# Patient Record
Sex: MALE | Race: BLACK OR AFRICAN AMERICAN | Employment: OTHER | URBAN - METROPOLITAN AREA
[De-identification: names, ages, dates, MRNs, and addresses within clinical notes are randomized per-mention and may not be internally consistent; named-entity substitution may affect disease eponyms.]

---

## 2017-03-15 ENCOUNTER — HOSPITAL ENCOUNTER (OUTPATIENT)
Age: 62
Setting detail: SPECIMEN
Discharge: HOME | End: 2017-03-15
Payer: MEDICARE

## 2017-03-15 ENCOUNTER — HOSPITAL ENCOUNTER (OUTPATIENT)
Age: 62
Discharge: HOME | End: 2017-03-15
Payer: MEDICARE

## 2017-03-15 ENCOUNTER — OFFICE VISIT (OUTPATIENT)
Dept: INTERNAL MEDICINE | Age: 62
End: 2017-03-15
Payer: MEDICARE

## 2017-03-15 VITALS
HEART RATE: 85 BPM | HEIGHT: 72 IN | DIASTOLIC BLOOD PRESSURE: 79 MMHG | TEMPERATURE: 98 F | BODY MASS INDEX: 17.88 KG/M2 | SYSTOLIC BLOOD PRESSURE: 113 MMHG | OXYGEN SATURATION: 100 % | WEIGHT: 132 LBS

## 2017-03-15 DIAGNOSIS — N18.3 CKD (CHRONIC KIDNEY DISEASE), STAGE 3 (MODERATE): ICD-10-CM

## 2017-03-15 DIAGNOSIS — J44.9 MODERATE COPD (CHRONIC OBSTRUCTIVE PULMONARY DISEASE) (HCC): ICD-10-CM

## 2017-03-15 DIAGNOSIS — I25.5 ISCHEMIC CARDIOMYOPATHY: Primary | ICD-10-CM

## 2017-03-15 DIAGNOSIS — I25.10 CORONARY ARTERY DISEASE INVOLVING NATIVE CORONARY ARTERY OF NATIVE HEART WITHOUT ANGINA PECTORIS: ICD-10-CM

## 2017-03-15 DIAGNOSIS — H61.23 BILATERAL IMPACTED CERUMEN: ICD-10-CM

## 2017-03-15 DIAGNOSIS — R73.02 IMPAIRED GLUCOSE TOLERANCE: ICD-10-CM

## 2017-03-15 DIAGNOSIS — F17.200 SMOKER: ICD-10-CM

## 2017-03-15 DIAGNOSIS — I10 ESSENTIAL HYPERTENSION: ICD-10-CM

## 2017-03-15 DIAGNOSIS — R73.03 PREDIABETES: ICD-10-CM

## 2017-03-15 LAB
ANION GAP SERPL CALCULATED.3IONS-SCNC: 15 MMOL/L (ref 9–17)
BUN BLDV-MCNC: 11 MG/DL (ref 8–23)
BUN/CREAT BLD: NORMAL (ref 9–20)
CALCIUM SERPL-MCNC: 8.9 MG/DL (ref 8.6–10.4)
CHLORIDE BLD-SCNC: 99 MMOL/L (ref 98–107)
CO2: 24 MMOL/L (ref 20–31)
CREAT SERPL-MCNC: 1.01 MG/DL (ref 0.7–1.2)
ESTIMATED AVERAGE GLUCOSE: 123 MG/DL
GFR AFRICAN AMERICAN: >60 ML/MIN
GFR NON-AFRICAN AMERICAN: >60 ML/MIN
GFR SERPL CREATININE-BSD FRML MDRD: NORMAL ML/MIN/{1.73_M2}
GFR SERPL CREATININE-BSD FRML MDRD: NORMAL ML/MIN/{1.73_M2}
GLUCOSE BLD-MCNC: 75 MG/DL (ref 70–99)
HBA1C MFR BLD: 5.9 % (ref 4–6)
POTASSIUM SERPL-SCNC: 4.1 MMOL/L (ref 3.7–5.3)
SODIUM BLD-SCNC: 138 MMOL/L (ref 135–144)

## 2017-03-15 PROCEDURE — 83036 HEMOGLOBIN GLYCOSYLATED A1C: CPT

## 2017-03-15 PROCEDURE — 99212 OFFICE O/P EST SF 10 MIN: CPT | Performed by: INTERNAL MEDICINE

## 2017-03-15 PROCEDURE — G8599 NO ASA/ANTIPLAT THER USE RNG: HCPCS | Performed by: INTERNAL MEDICINE

## 2017-03-15 PROCEDURE — 36415 COLL VENOUS BLD VENIPUNCTURE: CPT

## 2017-03-15 PROCEDURE — G8484 FLU IMMUNIZE NO ADMIN: HCPCS | Performed by: INTERNAL MEDICINE

## 2017-03-15 PROCEDURE — G8926 SPIRO NO PERF OR DOC: HCPCS | Performed by: INTERNAL MEDICINE

## 2017-03-15 PROCEDURE — 99214 OFFICE O/P EST MOD 30 MIN: CPT | Performed by: INTERNAL MEDICINE

## 2017-03-15 PROCEDURE — 80048 BASIC METABOLIC PNL TOTAL CA: CPT

## 2017-03-15 PROCEDURE — 3023F SPIROM DOC REV: CPT | Performed by: INTERNAL MEDICINE

## 2017-03-15 PROCEDURE — 4004F PT TOBACCO SCREEN RCVD TLK: CPT | Performed by: INTERNAL MEDICINE

## 2017-03-15 PROCEDURE — G8419 CALC BMI OUT NRM PARAM NOF/U: HCPCS | Performed by: INTERNAL MEDICINE

## 2017-03-15 PROCEDURE — G8427 DOCREV CUR MEDS BY ELIG CLIN: HCPCS | Performed by: INTERNAL MEDICINE

## 2017-03-15 PROCEDURE — 3017F COLORECTAL CA SCREEN DOC REV: CPT | Performed by: INTERNAL MEDICINE

## 2017-03-15 ASSESSMENT — ENCOUNTER SYMPTOMS
EYE REDNESS: 0
SHORTNESS OF BREATH: 1
ABDOMINAL PAIN: 0
SORE THROAT: 0
BLURRED VISION: 0
SPUTUM PRODUCTION: 0
NAUSEA: 0
ORTHOPNEA: 0
COUGH: 1
WHEEZING: 0
CONSTIPATION: 0

## 2017-03-15 ASSESSMENT — PATIENT HEALTH QUESTIONNAIRE - PHQ9
2. FEELING DOWN, DEPRESSED OR HOPELESS: 0
1. LITTLE INTEREST OR PLEASURE IN DOING THINGS: 1
SUM OF ALL RESPONSES TO PHQ9 QUESTIONS 1 & 2: 1
SUM OF ALL RESPONSES TO PHQ QUESTIONS 1-9: 1

## 2017-03-22 ENCOUNTER — NURSE ONLY (OUTPATIENT)
Dept: INTERNAL MEDICINE | Age: 62
End: 2017-03-22
Payer: MEDICARE

## 2017-03-22 DIAGNOSIS — H61.23 BILATERAL IMPACTED CERUMEN: Primary | ICD-10-CM

## 2017-03-22 PROCEDURE — 99211 OFF/OP EST MAY X REQ PHY/QHP: CPT | Performed by: INTERNAL MEDICINE

## 2017-06-21 ENCOUNTER — OFFICE VISIT (OUTPATIENT)
Dept: INTERNAL MEDICINE | Age: 62
End: 2017-06-21
Payer: MEDICARE

## 2017-06-21 VITALS
BODY MASS INDEX: 17.47 KG/M2 | HEIGHT: 72 IN | OXYGEN SATURATION: 100 % | SYSTOLIC BLOOD PRESSURE: 87 MMHG | DIASTOLIC BLOOD PRESSURE: 62 MMHG | HEART RATE: 83 BPM | WEIGHT: 129 LBS

## 2017-06-21 DIAGNOSIS — Z87.891 PERSONAL HISTORY OF TOBACCO USE: ICD-10-CM

## 2017-06-21 DIAGNOSIS — N18.3 CKD (CHRONIC KIDNEY DISEASE), STAGE 3 (MODERATE): ICD-10-CM

## 2017-06-21 DIAGNOSIS — I25.10 CORONARY ARTERY DISEASE INVOLVING NATIVE CORONARY ARTERY OF NATIVE HEART WITHOUT ANGINA PECTORIS: ICD-10-CM

## 2017-06-21 DIAGNOSIS — E78.00 PURE HYPERCHOLESTEROLEMIA: ICD-10-CM

## 2017-06-21 DIAGNOSIS — I42.9 CARDIOMYOPATHY (HCC): ICD-10-CM

## 2017-06-21 DIAGNOSIS — R73.03 PREDIABETES: ICD-10-CM

## 2017-06-21 DIAGNOSIS — F10.21 ALCOHOL DEPENDENCE IN REMISSION (HCC): ICD-10-CM

## 2017-06-21 DIAGNOSIS — K63.5 COLON POLYP: ICD-10-CM

## 2017-06-21 DIAGNOSIS — J44.9 COPD, MODERATE (HCC): ICD-10-CM

## 2017-06-21 DIAGNOSIS — F17.200 SMOKER: ICD-10-CM

## 2017-06-21 DIAGNOSIS — D64.9 ANEMIA, UNSPECIFIED TYPE: ICD-10-CM

## 2017-06-21 DIAGNOSIS — I10 ESSENTIAL HYPERTENSION: Primary | ICD-10-CM

## 2017-06-21 PROCEDURE — G8926 SPIRO NO PERF OR DOC: HCPCS | Performed by: INTERNAL MEDICINE

## 2017-06-21 PROCEDURE — 99214 OFFICE O/P EST MOD 30 MIN: CPT | Performed by: INTERNAL MEDICINE

## 2017-06-21 PROCEDURE — G0296 VISIT TO DETERM LDCT ELIG: HCPCS | Performed by: INTERNAL MEDICINE

## 2017-06-21 PROCEDURE — G8419 CALC BMI OUT NRM PARAM NOF/U: HCPCS | Performed by: INTERNAL MEDICINE

## 2017-06-21 PROCEDURE — G8427 DOCREV CUR MEDS BY ELIG CLIN: HCPCS | Performed by: INTERNAL MEDICINE

## 2017-06-21 PROCEDURE — 99215 OFFICE O/P EST HI 40 MIN: CPT

## 2017-06-21 PROCEDURE — G8599 NO ASA/ANTIPLAT THER USE RNG: HCPCS | Performed by: INTERNAL MEDICINE

## 2017-06-21 PROCEDURE — 3023F SPIROM DOC REV: CPT | Performed by: INTERNAL MEDICINE

## 2017-06-21 PROCEDURE — 3017F COLORECTAL CA SCREEN DOC REV: CPT | Performed by: INTERNAL MEDICINE

## 2017-06-21 PROCEDURE — 4004F PT TOBACCO SCREEN RCVD TLK: CPT | Performed by: INTERNAL MEDICINE

## 2017-06-21 RX ORDER — LOVASTATIN 40 MG/1
TABLET ORAL
Qty: 90 TABLET | Refills: 2 | Status: SHIPPED | OUTPATIENT
Start: 2017-06-21 | End: 2018-01-30 | Stop reason: SDUPTHER

## 2017-06-21 RX ORDER — ALBUTEROL SULFATE 90 UG/1
2 AEROSOL, METERED RESPIRATORY (INHALATION) EVERY 6 HOURS PRN
Qty: 1 INHALER | Refills: 3 | Status: SHIPPED | OUTPATIENT
Start: 2017-06-21 | End: 2019-03-07 | Stop reason: SDUPTHER

## 2017-06-21 RX ORDER — LISINOPRIL 2.5 MG/1
TABLET ORAL
Qty: 90 TABLET | Refills: 2 | Status: SHIPPED | OUTPATIENT
Start: 2017-06-21 | End: 2018-01-30 | Stop reason: SDUPTHER

## 2017-06-21 RX ORDER — BUDESONIDE AND FORMOTEROL FUMARATE DIHYDRATE 80; 4.5 UG/1; UG/1
2 AEROSOL RESPIRATORY (INHALATION) 2 TIMES DAILY
Qty: 2 INHALER | Refills: 5 | Status: SHIPPED | OUTPATIENT
Start: 2017-06-21 | End: 2018-01-30 | Stop reason: SDUPTHER

## 2017-06-21 ASSESSMENT — ENCOUNTER SYMPTOMS
COUGH: 1
WHEEZING: 0
BLURRED VISION: 0
EYE PAIN: 0
NAUSEA: 0
SHORTNESS OF BREATH: 0
ABDOMINAL PAIN: 0
HEARTBURN: 0
BLOOD IN STOOL: 0
CONSTIPATION: 1
DIARRHEA: 0
SPUTUM PRODUCTION: 1
EYE REDNESS: 0

## 2017-06-22 ENCOUNTER — TELEPHONE (OUTPATIENT)
Dept: CASE MANAGEMENT | Age: 62
End: 2017-06-22

## 2017-06-27 ENCOUNTER — TELEPHONE (OUTPATIENT)
Dept: CASE MANAGEMENT | Age: 62
End: 2017-06-27

## 2017-06-29 ENCOUNTER — HOSPITAL ENCOUNTER (OUTPATIENT)
Dept: CT IMAGING | Age: 62
Discharge: HOME | End: 2017-06-29
Payer: MEDICARE

## 2017-06-29 DIAGNOSIS — Z87.891 PERSONAL HISTORY OF TOBACCO USE: ICD-10-CM

## 2017-06-29 PROCEDURE — G0297 LDCT FOR LUNG CA SCREEN: HCPCS

## 2017-07-21 DIAGNOSIS — J44.9 COPD, MODERATE (HCC): ICD-10-CM

## 2017-07-21 RX ORDER — TIOTROPIUM BROMIDE 18 UG/1
CAPSULE ORAL; RESPIRATORY (INHALATION)
Qty: 30 CAPSULE | Refills: 0 | Status: SHIPPED | OUTPATIENT
Start: 2017-07-21 | End: 2018-01-30 | Stop reason: SDUPTHER

## 2017-08-10 ENCOUNTER — TELEPHONE (OUTPATIENT)
Dept: INTERNAL MEDICINE | Age: 62
End: 2017-08-10

## 2017-08-10 ENCOUNTER — OFFICE VISIT (OUTPATIENT)
Dept: GASTROENTEROLOGY | Age: 62
End: 2017-08-10
Payer: MEDICARE

## 2017-08-10 VITALS
WEIGHT: 130 LBS | HEIGHT: 72 IN | BODY MASS INDEX: 17.61 KG/M2 | DIASTOLIC BLOOD PRESSURE: 59 MMHG | SYSTOLIC BLOOD PRESSURE: 94 MMHG | OXYGEN SATURATION: 99 % | HEART RATE: 92 BPM | TEMPERATURE: 98.1 F

## 2017-08-10 DIAGNOSIS — Z86.010 HISTORY OF COLON POLYPS: ICD-10-CM

## 2017-08-10 DIAGNOSIS — D64.9 ANEMIA, UNSPECIFIED TYPE: ICD-10-CM

## 2017-08-10 PROBLEM — Z86.0100 HISTORY OF COLON POLYPS: Status: ACTIVE | Noted: 2017-08-10

## 2017-08-10 PROCEDURE — G8599 NO ASA/ANTIPLAT THER USE RNG: HCPCS | Performed by: INTERNAL MEDICINE

## 2017-08-10 PROCEDURE — 4004F PT TOBACCO SCREEN RCVD TLK: CPT | Performed by: INTERNAL MEDICINE

## 2017-08-10 PROCEDURE — G8427 DOCREV CUR MEDS BY ELIG CLIN: HCPCS | Performed by: INTERNAL MEDICINE

## 2017-08-10 PROCEDURE — 99204 OFFICE O/P NEW MOD 45 MIN: CPT | Performed by: INTERNAL MEDICINE

## 2017-08-10 PROCEDURE — G8419 CALC BMI OUT NRM PARAM NOF/U: HCPCS | Performed by: INTERNAL MEDICINE

## 2017-08-10 PROCEDURE — 3017F COLORECTAL CA SCREEN DOC REV: CPT | Performed by: INTERNAL MEDICINE

## 2017-08-10 ASSESSMENT — ENCOUNTER SYMPTOMS
BLOOD IN STOOL: 0
TROUBLE SWALLOWING: 0
SORE THROAT: 0
CONSTIPATION: 1
VOMITING: 0
ABDOMINAL PAIN: 0
ANAL BLEEDING: 0
DIARRHEA: 0
CHOKING: 0
ABDOMINAL DISTENTION: 0
COUGH: 0
BACK PAIN: 1
SHORTNESS OF BREATH: 0
NAUSEA: 0
RECTAL PAIN: 0

## 2017-08-22 DIAGNOSIS — E78.00 PURE HYPERCHOLESTEROLEMIA: ICD-10-CM

## 2017-08-24 RX ORDER — LOVASTATIN 40 MG/1
TABLET ORAL
Qty: 90 TABLET | Refills: 0 | Status: SHIPPED | OUTPATIENT
Start: 2017-08-24 | End: 2017-09-13 | Stop reason: SDUPTHER

## 2017-09-13 ENCOUNTER — HOSPITAL ENCOUNTER (OUTPATIENT)
Dept: PREADMISSION TESTING | Age: 62
Discharge: HOME | End: 2017-09-13
Payer: MEDICARE

## 2017-09-13 VITALS
HEART RATE: 87 BPM | SYSTOLIC BLOOD PRESSURE: 97 MMHG | RESPIRATION RATE: 16 BRPM | DIASTOLIC BLOOD PRESSURE: 71 MMHG | HEIGHT: 72 IN | WEIGHT: 129.5 LBS | BODY MASS INDEX: 17.54 KG/M2 | OXYGEN SATURATION: 99 % | TEMPERATURE: 97.2 F

## 2017-09-13 LAB
ABSOLUTE EOS #: 0.08 K/UL (ref 0–0.4)
ABSOLUTE LYMPH #: 1.6 K/UL (ref 1–4.8)
ABSOLUTE MONO #: 0.53 K/UL (ref 0.1–1.3)
ANION GAP SERPL CALCULATED.3IONS-SCNC: 13 MMOL/L (ref 9–17)
BASOPHILS # BLD: 1 %
BASOPHILS ABSOLUTE: 0.08 K/UL (ref 0–0.2)
BUN BLDV-MCNC: 11 MG/DL (ref 8–23)
BUN/CREAT BLD: NORMAL (ref 9–20)
CALCIUM SERPL-MCNC: 9.1 MG/DL (ref 8.6–10.4)
CHLORIDE BLD-SCNC: 102 MMOL/L (ref 98–107)
CO2: 25 MMOL/L (ref 20–31)
CREAT SERPL-MCNC: 1.07 MG/DL (ref 0.7–1.2)
DIFFERENTIAL TYPE: ABNORMAL
EOSINOPHILS RELATIVE PERCENT: 1 %
GFR AFRICAN AMERICAN: >60 ML/MIN
GFR NON-AFRICAN AMERICAN: >60 ML/MIN
GFR SERPL CREATININE-BSD FRML MDRD: NORMAL ML/MIN/{1.73_M2}
GFR SERPL CREATININE-BSD FRML MDRD: NORMAL ML/MIN/{1.73_M2}
GLUCOSE BLD-MCNC: 94 MG/DL (ref 70–99)
HCT VFR BLD CALC: 25 % (ref 41–53)
HEMOGLOBIN: 7.2 G/DL (ref 13.5–17.5)
LYMPHOCYTES # BLD: 21 %
MCH RBC QN AUTO: 19.5 PG (ref 26–34)
MCHC RBC AUTO-ENTMCNC: 28.8 G/DL (ref 31–37)
MCV RBC AUTO: 67.6 FL (ref 80–100)
MONOCYTES # BLD: 7 %
MORPHOLOGY: ABNORMAL
PDW BLD-RTO: 21.3 % (ref 11.5–14.9)
PLATELET # BLD: 458 K/UL (ref 150–450)
PLATELET ESTIMATE: ABNORMAL
PMV BLD AUTO: 7 FL (ref 6–12)
POTASSIUM SERPL-SCNC: 4.2 MMOL/L (ref 3.7–5.3)
RBC # BLD: 3.69 M/UL (ref 4.5–5.9)
RBC # BLD: ABNORMAL 10*6/UL
SEG NEUTROPHILS: 70 %
SEGMENTED NEUTROPHILS ABSOLUTE COUNT: 5.31 K/UL (ref 1.3–9.1)
SODIUM BLD-SCNC: 140 MMOL/L (ref 135–144)
WBC # BLD: 7.6 K/UL (ref 3.5–11)
WBC # BLD: ABNORMAL 10*3/UL

## 2017-09-13 PROCEDURE — 85025 COMPLETE CBC W/AUTO DIFF WBC: CPT

## 2017-09-13 PROCEDURE — 80048 BASIC METABOLIC PNL TOTAL CA: CPT

## 2017-09-13 PROCEDURE — 36415 COLL VENOUS BLD VENIPUNCTURE: CPT

## 2017-09-13 PROCEDURE — 93005 ELECTROCARDIOGRAM TRACING: CPT

## 2017-09-13 RX ORDER — ASPIRIN 325 MG
325 TABLET ORAL DAILY
Status: ON HOLD | COMMUNITY
End: 2018-01-24 | Stop reason: HOSPADM

## 2017-09-14 ENCOUNTER — ANESTHESIA EVENT (OUTPATIENT)
Dept: OPERATING ROOM | Age: 62
End: 2017-09-14
Payer: MEDICARE

## 2017-09-14 LAB
EKG ATRIAL RATE: 83 BPM
EKG P AXIS: 83 DEGREES
EKG P-R INTERVAL: 140 MS
EKG Q-T INTERVAL: 364 MS
EKG QRS DURATION: 88 MS
EKG QTC CALCULATION (BAZETT): 427 MS
EKG R AXIS: 71 DEGREES
EKG T AXIS: 71 DEGREES
EKG VENTRICULAR RATE: 83 BPM

## 2017-09-14 RX ORDER — SODIUM CHLORIDE 0.9 % (FLUSH) 0.9 %
10 SYRINGE (ML) INJECTION PRN
Status: CANCELLED | OUTPATIENT
Start: 2017-09-14

## 2017-09-14 RX ORDER — SODIUM CHLORIDE 0.9 % (FLUSH) 0.9 %
10 SYRINGE (ML) INJECTION EVERY 12 HOURS SCHEDULED
Status: CANCELLED | OUTPATIENT
Start: 2017-09-14

## 2017-09-14 RX ORDER — LIDOCAINE HYDROCHLORIDE 10 MG/ML
1 INJECTION, SOLUTION EPIDURAL; INFILTRATION; INTRACAUDAL; PERINEURAL
Status: CANCELLED | OUTPATIENT
Start: 2017-09-14 | End: 2017-09-14

## 2017-09-14 RX ORDER — SODIUM CHLORIDE, SODIUM LACTATE, POTASSIUM CHLORIDE, CALCIUM CHLORIDE 600; 310; 30; 20 MG/100ML; MG/100ML; MG/100ML; MG/100ML
INJECTION, SOLUTION INTRAVENOUS CONTINUOUS
Status: CANCELLED | OUTPATIENT
Start: 2017-09-14

## 2017-09-19 ENCOUNTER — ANESTHESIA (OUTPATIENT)
Dept: OPERATING ROOM | Age: 62
End: 2017-09-19
Payer: MEDICARE

## 2017-09-19 ENCOUNTER — HOSPITAL ENCOUNTER (OUTPATIENT)
Age: 62
Setting detail: OUTPATIENT SURGERY
Discharge: HOME | End: 2017-09-19
Attending: INTERNAL MEDICINE | Admitting: INTERNAL MEDICINE
Payer: MEDICARE

## 2017-09-19 VITALS
RESPIRATION RATE: 26 BRPM | OXYGEN SATURATION: 100 % | SYSTOLIC BLOOD PRESSURE: 108 MMHG | DIASTOLIC BLOOD PRESSURE: 69 MMHG

## 2017-09-19 VITALS
OXYGEN SATURATION: 100 % | HEART RATE: 71 BPM | SYSTOLIC BLOOD PRESSURE: 127 MMHG | HEIGHT: 72 IN | RESPIRATION RATE: 16 BRPM | TEMPERATURE: 98.2 F | DIASTOLIC BLOOD PRESSURE: 85 MMHG

## 2017-09-19 PROCEDURE — 7100000011 HC PHASE II RECOVERY - ADDTL 15 MIN: Performed by: INTERNAL MEDICINE

## 2017-09-19 PROCEDURE — 6360000002 HC RX W HCPCS: Performed by: NURSE ANESTHETIST, CERTIFIED REGISTERED

## 2017-09-19 PROCEDURE — 2500000003 HC RX 250 WO HCPCS: Performed by: NURSE ANESTHETIST, CERTIFIED REGISTERED

## 2017-09-19 PROCEDURE — 3700000001 HC ADD 15 MINUTES (ANESTHESIA): Performed by: INTERNAL MEDICINE

## 2017-09-19 PROCEDURE — 43255 EGD CONTROL BLEEDING ANY: CPT | Performed by: INTERNAL MEDICINE

## 2017-09-19 PROCEDURE — 7100000010 HC PHASE II RECOVERY - FIRST 15 MIN: Performed by: INTERNAL MEDICINE

## 2017-09-19 PROCEDURE — 3609027000 HC COLONOSCOPY: Performed by: INTERNAL MEDICINE

## 2017-09-19 PROCEDURE — 2580000003 HC RX 258: Performed by: ANESTHESIOLOGY

## 2017-09-19 PROCEDURE — 7100000000 HC PACU RECOVERY - FIRST 15 MIN: Performed by: INTERNAL MEDICINE

## 2017-09-19 PROCEDURE — 45378 DIAGNOSTIC COLONOSCOPY: CPT | Performed by: INTERNAL MEDICINE

## 2017-09-19 PROCEDURE — 2720000010 HC SURG SUPPLY STERILE: Performed by: INTERNAL MEDICINE

## 2017-09-19 PROCEDURE — 2780000010 HC IMPLANT OTHER: Performed by: INTERNAL MEDICINE

## 2017-09-19 PROCEDURE — 3609017100 HC EGD: Performed by: INTERNAL MEDICINE

## 2017-09-19 PROCEDURE — 7100000031 HC ASPR PHASE II RECOVERY - ADDTL 15 MIN: Performed by: INTERNAL MEDICINE

## 2017-09-19 PROCEDURE — 7100000001 HC PACU RECOVERY - ADDTL 15 MIN: Performed by: INTERNAL MEDICINE

## 2017-09-19 PROCEDURE — 7100000030 HC ASPR PHASE II RECOVERY - FIRST 15 MIN: Performed by: INTERNAL MEDICINE

## 2017-09-19 PROCEDURE — 3700000000 HC ANESTHESIA ATTENDED CARE: Performed by: INTERNAL MEDICINE

## 2017-09-19 DEVICE — CLIPPING DEVICE
Type: IMPLANTABLE DEVICE | Site: ESOPHAGUS | Status: FUNCTIONAL
Brand: RESOLUTION CLIP

## 2017-09-19 RX ORDER — PROPOFOL 10 MG/ML
INJECTION, EMULSION INTRAVENOUS PRN
Status: DISCONTINUED | OUTPATIENT
Start: 2017-09-19 | End: 2017-09-19 | Stop reason: SDUPTHER

## 2017-09-19 RX ORDER — SODIUM CHLORIDE, SODIUM LACTATE, POTASSIUM CHLORIDE, CALCIUM CHLORIDE 600; 310; 30; 20 MG/100ML; MG/100ML; MG/100ML; MG/100ML
INJECTION, SOLUTION INTRAVENOUS CONTINUOUS
Status: DISCONTINUED | OUTPATIENT
Start: 2017-09-19 | End: 2017-09-19 | Stop reason: HOSPADM

## 2017-09-19 RX ORDER — SODIUM CHLORIDE 0.9 % (FLUSH) 0.9 %
10 SYRINGE (ML) INJECTION PRN
Status: DISCONTINUED | OUTPATIENT
Start: 2017-09-19 | End: 2017-09-19 | Stop reason: HOSPADM

## 2017-09-19 RX ORDER — LIDOCAINE HYDROCHLORIDE 10 MG/ML
1 INJECTION, SOLUTION EPIDURAL; INFILTRATION; INTRACAUDAL; PERINEURAL
Status: DISCONTINUED | OUTPATIENT
Start: 2017-09-19 | End: 2017-09-19 | Stop reason: HOSPADM

## 2017-09-19 RX ORDER — SODIUM CHLORIDE 0.9 % (FLUSH) 0.9 %
10 SYRINGE (ML) INJECTION EVERY 12 HOURS SCHEDULED
Status: DISCONTINUED | OUTPATIENT
Start: 2017-09-19 | End: 2017-09-19 | Stop reason: HOSPADM

## 2017-09-19 RX ORDER — LIDOCAINE HYDROCHLORIDE 10 MG/ML
INJECTION, SOLUTION EPIDURAL; INFILTRATION; INTRACAUDAL; PERINEURAL PRN
Status: DISCONTINUED | OUTPATIENT
Start: 2017-09-19 | End: 2017-09-19 | Stop reason: SDUPTHER

## 2017-09-19 RX ADMIN — PROPOFOL 20 MG: 10 INJECTION, EMULSION INTRAVENOUS at 11:51

## 2017-09-19 RX ADMIN — PROPOFOL 10 MG: 10 INJECTION, EMULSION INTRAVENOUS at 12:09

## 2017-09-19 RX ADMIN — PROPOFOL 10 MG: 10 INJECTION, EMULSION INTRAVENOUS at 12:14

## 2017-09-19 RX ADMIN — PROPOFOL 10 MG: 10 INJECTION, EMULSION INTRAVENOUS at 12:12

## 2017-09-19 RX ADMIN — PROPOFOL 10 MG: 10 INJECTION, EMULSION INTRAVENOUS at 11:53

## 2017-09-19 RX ADMIN — PROPOFOL 10 MG: 10 INJECTION, EMULSION INTRAVENOUS at 12:16

## 2017-09-19 RX ADMIN — PROPOFOL 20 MG: 10 INJECTION, EMULSION INTRAVENOUS at 11:48

## 2017-09-19 RX ADMIN — PROPOFOL 10 MG: 10 INJECTION, EMULSION INTRAVENOUS at 11:54

## 2017-09-19 RX ADMIN — PROPOFOL 10 MG: 10 INJECTION, EMULSION INTRAVENOUS at 12:02

## 2017-09-19 RX ADMIN — PROPOFOL 10 MG: 10 INJECTION, EMULSION INTRAVENOUS at 11:55

## 2017-09-19 RX ADMIN — PROPOFOL 50 MG: 10 INJECTION, EMULSION INTRAVENOUS at 11:46

## 2017-09-19 RX ADMIN — PROPOFOL 10 MG: 10 INJECTION, EMULSION INTRAVENOUS at 11:56

## 2017-09-19 RX ADMIN — SODIUM CHLORIDE, POTASSIUM CHLORIDE, SODIUM LACTATE AND CALCIUM CHLORIDE: 600; 310; 30; 20 INJECTION, SOLUTION INTRAVENOUS at 11:15

## 2017-09-19 RX ADMIN — LIDOCAINE HYDROCHLORIDE 50 MG: 10 INJECTION, SOLUTION EPIDURAL; INFILTRATION; INTRACAUDAL; PERINEURAL at 11:46

## 2017-09-19 RX ADMIN — PROPOFOL 10 MG: 10 INJECTION, EMULSION INTRAVENOUS at 11:49

## 2017-09-19 RX ADMIN — PROPOFOL 20 MG: 10 INJECTION, EMULSION INTRAVENOUS at 12:04

## 2017-09-19 RX ADMIN — PROPOFOL 10 MG: 10 INJECTION, EMULSION INTRAVENOUS at 11:57

## 2017-09-19 RX ADMIN — PROPOFOL 10 MG: 10 INJECTION, EMULSION INTRAVENOUS at 12:01

## 2017-09-19 RX ADMIN — PROPOFOL 10 MG: 10 INJECTION, EMULSION INTRAVENOUS at 12:07

## 2017-09-19 RX ADMIN — PROPOFOL 10 MG: 10 INJECTION, EMULSION INTRAVENOUS at 12:00

## 2017-09-19 RX ADMIN — PROPOFOL 10 MG: 10 INJECTION, EMULSION INTRAVENOUS at 12:05

## 2017-09-19 ASSESSMENT — PAIN - FUNCTIONAL ASSESSMENT: PAIN_FUNCTIONAL_ASSESSMENT: 0-10

## 2017-09-19 ASSESSMENT — PAIN SCALES - GENERAL
PAINLEVEL_OUTOF10: 0

## 2017-09-19 ASSESSMENT — COPD QUESTIONNAIRES: CAT_SEVERITY: NO INTERVAL CHANGE

## 2018-01-22 ENCOUNTER — APPOINTMENT (OUTPATIENT)
Dept: GENERAL RADIOLOGY | Age: 63
DRG: 812 | End: 2018-01-22
Payer: MEDICARE

## 2018-01-22 ENCOUNTER — HOSPITAL ENCOUNTER (INPATIENT)
Age: 63
LOS: 1 days | Discharge: HOME HEALTH | DRG: 812 | End: 2018-01-24
Attending: EMERGENCY MEDICINE | Admitting: INTERNAL MEDICINE
Payer: MEDICARE

## 2018-01-22 DIAGNOSIS — D64.9 ANEMIA, UNSPECIFIED TYPE: ICD-10-CM

## 2018-01-22 DIAGNOSIS — R07.9 CHEST PAIN, UNSPECIFIED TYPE: Primary | ICD-10-CM

## 2018-01-22 LAB
ANION GAP SERPL CALCULATED.3IONS-SCNC: 13 MMOL/L (ref 9–17)
BUN BLDV-MCNC: 10 MG/DL (ref 8–23)
BUN/CREAT BLD: ABNORMAL (ref 9–20)
CALCIUM SERPL-MCNC: 8.3 MG/DL (ref 8.6–10.4)
CHLORIDE BLD-SCNC: 105 MMOL/L (ref 98–107)
CO2: 24 MMOL/L (ref 20–31)
CREAT SERPL-MCNC: 1.2 MG/DL (ref 0.7–1.2)
EKG ATRIAL RATE: 98 BPM
EKG P AXIS: 85 DEGREES
EKG P-R INTERVAL: 132 MS
EKG Q-T INTERVAL: 338 MS
EKG QRS DURATION: 84 MS
EKG QTC CALCULATION (BAZETT): 431 MS
EKG R AXIS: 73 DEGREES
EKG T AXIS: 77 DEGREES
EKG VENTRICULAR RATE: 98 BPM
FERRITIN: 13 UG/L (ref 30–400)
FOLATE: 14.4 NG/ML
GFR AFRICAN AMERICAN: >60 ML/MIN
GFR NON-AFRICAN AMERICAN: >60 ML/MIN
GFR SERPL CREATININE-BSD FRML MDRD: ABNORMAL ML/MIN/{1.73_M2}
GFR SERPL CREATININE-BSD FRML MDRD: ABNORMAL ML/MIN/{1.73_M2}
GLUCOSE BLD-MCNC: 98 MG/DL (ref 70–99)
HCT VFR BLD CALC: 26.4 % (ref 40.7–50.3)
HCT VFR BLD CALC: 26.9 % (ref 40.7–50.3)
HEMOGLOBIN: 6.7 G/DL (ref 13–17)
HEMOGLOBIN: 7.2 G/DL (ref 13–17)
IRON SATURATION: 4 % (ref 20–55)
IRON: 16 UG/DL (ref 59–158)
MCH RBC QN AUTO: 17.9 PG (ref 25.2–33.5)
MCHC RBC AUTO-ENTMCNC: 25.4 G/DL (ref 28.4–34.8)
MCV RBC AUTO: 70.4 FL (ref 82.6–102.9)
NRBC AUTOMATED: 0 PER 100 WBC
PDW BLD-RTO: 23.1 % (ref 11.8–14.4)
PLATELET # BLD: 397 K/UL (ref 138–453)
PMV BLD AUTO: 9.5 FL (ref 8.1–13.5)
POC TROPONIN I: 0 NG/ML (ref 0–0.1)
POC TROPONIN I: 0.01 NG/ML (ref 0–0.1)
POC TROPONIN INTERP: NORMAL
POC TROPONIN INTERP: NORMAL
POTASSIUM SERPL-SCNC: 4.3 MMOL/L (ref 3.7–5.3)
RBC # BLD: 3.75 M/UL (ref 4.21–5.77)
SODIUM BLD-SCNC: 142 MMOL/L (ref 135–144)
TOTAL IRON BINDING CAPACITY: 445 UG/DL (ref 250–450)
TROPONIN INTERP: NORMAL
TROPONIN INTERP: NORMAL
TROPONIN T: <0.03 NG/ML
TROPONIN T: <0.03 NG/ML
UNSATURATED IRON BINDING CAPACITY: 429 UG/DL (ref 112–347)
VITAMIN B-12: 571 PG/ML (ref 232–1245)
WBC # BLD: 5.3 K/UL (ref 3.5–11.3)

## 2018-01-22 PROCEDURE — P9016 RBC LEUKOCYTES REDUCED: HCPCS

## 2018-01-22 PROCEDURE — 82607 VITAMIN B-12: CPT

## 2018-01-22 PROCEDURE — 82746 ASSAY OF FOLIC ACID SERUM: CPT

## 2018-01-22 PROCEDURE — G0328 FECAL BLOOD SCRN IMMUNOASSAY: HCPCS

## 2018-01-22 PROCEDURE — G0378 HOSPITAL OBSERVATION PER HR: HCPCS

## 2018-01-22 PROCEDURE — 2580000003 HC RX 258: Performed by: EMERGENCY MEDICINE

## 2018-01-22 PROCEDURE — 6370000000 HC RX 637 (ALT 250 FOR IP): Performed by: NURSE PRACTITIONER

## 2018-01-22 PROCEDURE — 84484 ASSAY OF TROPONIN QUANT: CPT

## 2018-01-22 PROCEDURE — 6370000000 HC RX 637 (ALT 250 FOR IP): Performed by: INTERNAL MEDICINE

## 2018-01-22 PROCEDURE — 94640 AIRWAY INHALATION TREATMENT: CPT

## 2018-01-22 PROCEDURE — 86901 BLOOD TYPING SEROLOGIC RH(D): CPT

## 2018-01-22 PROCEDURE — 2580000003 HC RX 258: Performed by: INTERNAL MEDICINE

## 2018-01-22 PROCEDURE — 99285 EMERGENCY DEPT VISIT HI MDM: CPT

## 2018-01-22 PROCEDURE — 71046 X-RAY EXAM CHEST 2 VIEWS: CPT

## 2018-01-22 PROCEDURE — 96372 THER/PROPH/DIAG INJ SC/IM: CPT

## 2018-01-22 PROCEDURE — 6370000000 HC RX 637 (ALT 250 FOR IP): Performed by: EMERGENCY MEDICINE

## 2018-01-22 PROCEDURE — 94762 N-INVAS EAR/PLS OXIMTRY CONT: CPT

## 2018-01-22 PROCEDURE — 86920 COMPATIBILITY TEST SPIN: CPT

## 2018-01-22 PROCEDURE — 83540 ASSAY OF IRON: CPT

## 2018-01-22 PROCEDURE — 86900 BLOOD TYPING SEROLOGIC ABO: CPT

## 2018-01-22 PROCEDURE — 85027 COMPLETE CBC AUTOMATED: CPT

## 2018-01-22 PROCEDURE — 85018 HEMOGLOBIN: CPT

## 2018-01-22 PROCEDURE — 36415 COLL VENOUS BLD VENIPUNCTURE: CPT

## 2018-01-22 PROCEDURE — 82728 ASSAY OF FERRITIN: CPT

## 2018-01-22 PROCEDURE — 99223 1ST HOSP IP/OBS HIGH 75: CPT | Performed by: INTERNAL MEDICINE

## 2018-01-22 PROCEDURE — 93005 ELECTROCARDIOGRAM TRACING: CPT

## 2018-01-22 PROCEDURE — 80048 BASIC METABOLIC PNL TOTAL CA: CPT

## 2018-01-22 PROCEDURE — 86850 RBC ANTIBODY SCREEN: CPT

## 2018-01-22 PROCEDURE — 6360000002 HC RX W HCPCS: Performed by: INTERNAL MEDICINE

## 2018-01-22 PROCEDURE — 83550 IRON BINDING TEST: CPT

## 2018-01-22 PROCEDURE — 36430 TRANSFUSION BLD/BLD COMPNT: CPT

## 2018-01-22 PROCEDURE — 85014 HEMATOCRIT: CPT

## 2018-01-22 RX ORDER — MORPHINE SULFATE 2 MG/ML
2 INJECTION, SOLUTION INTRAMUSCULAR; INTRAVENOUS
Status: DISCONTINUED | OUTPATIENT
Start: 2018-01-22 | End: 2018-01-24 | Stop reason: HOSPADM

## 2018-01-22 RX ORDER — NITROGLYCERIN 0.4 MG/1
0.4 TABLET SUBLINGUAL EVERY 5 MIN PRN
Status: DISCONTINUED | OUTPATIENT
Start: 2018-01-22 | End: 2018-01-22 | Stop reason: SDUPTHER

## 2018-01-22 RX ORDER — ISOSORBIDE MONONITRATE 30 MG/1
30 TABLET, EXTENDED RELEASE ORAL DAILY
Status: DISCONTINUED | OUTPATIENT
Start: 2018-01-22 | End: 2018-01-24 | Stop reason: HOSPADM

## 2018-01-22 RX ORDER — SODIUM CHLORIDE 0.9 % (FLUSH) 0.9 %
10 SYRINGE (ML) INJECTION PRN
Status: DISCONTINUED | OUTPATIENT
Start: 2018-01-22 | End: 2018-01-24 | Stop reason: HOSPADM

## 2018-01-22 RX ORDER — POTASSIUM CHLORIDE 7.45 MG/ML
10 INJECTION INTRAVENOUS PRN
Status: DISCONTINUED | OUTPATIENT
Start: 2018-01-22 | End: 2018-01-24 | Stop reason: HOSPADM

## 2018-01-22 RX ORDER — ACETAMINOPHEN 325 MG/1
650 TABLET ORAL EVERY 4 HOURS PRN
Status: DISCONTINUED | OUTPATIENT
Start: 2018-01-22 | End: 2018-01-24 | Stop reason: HOSPADM

## 2018-01-22 RX ORDER — BISACODYL 10 MG
10 SUPPOSITORY, RECTAL RECTAL DAILY PRN
Status: DISCONTINUED | OUTPATIENT
Start: 2018-01-22 | End: 2018-01-24 | Stop reason: HOSPADM

## 2018-01-22 RX ORDER — POTASSIUM CHLORIDE 20MEQ/15ML
40 LIQUID (ML) ORAL PRN
Status: DISCONTINUED | OUTPATIENT
Start: 2018-01-22 | End: 2018-01-24 | Stop reason: HOSPADM

## 2018-01-22 RX ORDER — ALBUTEROL SULFATE 90 UG/1
2 AEROSOL, METERED RESPIRATORY (INHALATION) EVERY 6 HOURS PRN
Status: DISCONTINUED | OUTPATIENT
Start: 2018-01-22 | End: 2018-01-24 | Stop reason: HOSPADM

## 2018-01-22 RX ORDER — LISINOPRIL 2.5 MG/1
2.5 TABLET ORAL DAILY
Status: DISCONTINUED | OUTPATIENT
Start: 2018-01-22 | End: 2018-01-24 | Stop reason: HOSPADM

## 2018-01-22 RX ORDER — NICOTINE 21 MG/24HR
1 PATCH, TRANSDERMAL 24 HOURS TRANSDERMAL DAILY
Status: DISCONTINUED | OUTPATIENT
Start: 2018-01-22 | End: 2018-01-24 | Stop reason: HOSPADM

## 2018-01-22 RX ORDER — CLOPIDOGREL BISULFATE 75 MG/1
75 TABLET ORAL DAILY
Status: DISCONTINUED | OUTPATIENT
Start: 2018-01-22 | End: 2018-01-23

## 2018-01-22 RX ORDER — MAGNESIUM SULFATE 1 G/100ML
1 INJECTION INTRAVENOUS PRN
Status: DISCONTINUED | OUTPATIENT
Start: 2018-01-22 | End: 2018-01-24 | Stop reason: HOSPADM

## 2018-01-22 RX ORDER — SIMVASTATIN 20 MG
20 TABLET ORAL NIGHTLY
Status: DISCONTINUED | OUTPATIENT
Start: 2018-01-22 | End: 2018-01-24 | Stop reason: HOSPADM

## 2018-01-22 RX ORDER — ASPIRIN 325 MG
325 TABLET ORAL DAILY
Status: DISCONTINUED | OUTPATIENT
Start: 2018-01-22 | End: 2018-01-22 | Stop reason: SDUPTHER

## 2018-01-22 RX ORDER — ACETAMINOPHEN 325 MG/1
650 TABLET ORAL ONCE
Status: COMPLETED | OUTPATIENT
Start: 2018-01-22 | End: 2018-01-22

## 2018-01-22 RX ORDER — MORPHINE SULFATE 2 MG/ML
4 INJECTION, SOLUTION INTRAMUSCULAR; INTRAVENOUS
Status: DISCONTINUED | OUTPATIENT
Start: 2018-01-22 | End: 2018-01-24 | Stop reason: HOSPADM

## 2018-01-22 RX ORDER — SODIUM CHLORIDE 0.9 % (FLUSH) 0.9 %
10 SYRINGE (ML) INJECTION EVERY 12 HOURS SCHEDULED
Status: DISCONTINUED | OUTPATIENT
Start: 2018-01-22 | End: 2018-01-24 | Stop reason: HOSPADM

## 2018-01-22 RX ORDER — NITROGLYCERIN 0.4 MG/1
0.4 TABLET SUBLINGUAL EVERY 5 MIN PRN
Status: DISCONTINUED | OUTPATIENT
Start: 2018-01-22 | End: 2018-01-24 | Stop reason: HOSPADM

## 2018-01-22 RX ORDER — ONDANSETRON 2 MG/ML
4 INJECTION INTRAMUSCULAR; INTRAVENOUS EVERY 6 HOURS PRN
Status: DISCONTINUED | OUTPATIENT
Start: 2018-01-22 | End: 2018-01-24 | Stop reason: HOSPADM

## 2018-01-22 RX ORDER — 0.9 % SODIUM CHLORIDE 0.9 %
250 INTRAVENOUS SOLUTION INTRAVENOUS ONCE
Status: COMPLETED | OUTPATIENT
Start: 2018-01-22 | End: 2018-01-23

## 2018-01-22 RX ORDER — FAMOTIDINE 20 MG/1
20 TABLET, FILM COATED ORAL 2 TIMES DAILY
Status: DISCONTINUED | OUTPATIENT
Start: 2018-01-22 | End: 2018-01-24 | Stop reason: HOSPADM

## 2018-01-22 RX ORDER — HYDROCODONE BITARTRATE AND ACETAMINOPHEN 5; 325 MG/1; MG/1
1 TABLET ORAL EVERY 4 HOURS PRN
Status: DISCONTINUED | OUTPATIENT
Start: 2018-01-22 | End: 2018-01-24 | Stop reason: HOSPADM

## 2018-01-22 RX ORDER — POTASSIUM CHLORIDE 20 MEQ/1
40 TABLET, EXTENDED RELEASE ORAL PRN
Status: DISCONTINUED | OUTPATIENT
Start: 2018-01-22 | End: 2018-01-24 | Stop reason: HOSPADM

## 2018-01-22 RX ORDER — DOCUSATE SODIUM 100 MG/1
100 CAPSULE, LIQUID FILLED ORAL 2 TIMES DAILY
Status: DISCONTINUED | OUTPATIENT
Start: 2018-01-22 | End: 2018-01-24 | Stop reason: HOSPADM

## 2018-01-22 RX ADMIN — SIMVASTATIN 20 MG: 20 TABLET, FILM COATED ORAL at 20:48

## 2018-01-22 RX ADMIN — MOMETASONE FUROATE AND FORMOTEROL FUMARATE DIHYDRATE 2 PUFF: 200; 5 AEROSOL RESPIRATORY (INHALATION) at 21:27

## 2018-01-22 RX ADMIN — POLYETHYLENE GLYCOL 3350, SODIUM SULFATE ANHYDROUS, SODIUM BICARBONATE, SODIUM CHLORIDE, POTASSIUM CHLORIDE 4000 ML: 236; 22.74; 6.74; 5.86; 2.97 POWDER, FOR SOLUTION ORAL at 20:46

## 2018-01-22 RX ADMIN — METOPROLOL TARTRATE 25 MG: 25 TABLET ORAL at 20:46

## 2018-01-22 RX ADMIN — ENOXAPARIN SODIUM 40 MG: 40 INJECTION SUBCUTANEOUS at 20:46

## 2018-01-22 RX ADMIN — DOCUSATE SODIUM 100 MG: 100 CAPSULE ORAL at 22:34

## 2018-01-22 RX ADMIN — ACETAMINOPHEN 650 MG: 325 TABLET ORAL at 13:55

## 2018-01-22 RX ADMIN — FAMOTIDINE 20 MG: 20 TABLET, FILM COATED ORAL at 20:46

## 2018-01-22 RX ADMIN — BISACODYL 20 MG: 5 TABLET, DELAYED RELEASE ORAL at 22:34

## 2018-01-22 RX ADMIN — Medication 10 ML: at 20:47

## 2018-01-22 RX ADMIN — SODIUM CHLORIDE 250 ML: 9 INJECTION, SOLUTION INTRAVENOUS at 13:55

## 2018-01-22 ASSESSMENT — PAIN SCALES - GENERAL
PAINLEVEL_OUTOF10: 0
PAINLEVEL_OUTOF10: 2
PAINLEVEL_OUTOF10: 10
PAINLEVEL_OUTOF10: 0
PAINLEVEL_OUTOF10: 0

## 2018-01-22 ASSESSMENT — PAIN DESCRIPTION - DESCRIPTORS
DESCRIPTORS: ACHING
DESCRIPTORS: BURNING;ACHING;THROBBING

## 2018-01-22 ASSESSMENT — PAIN DESCRIPTION - LOCATION
LOCATION: CHEST
LOCATION: CHEST

## 2018-01-22 ASSESSMENT — ENCOUNTER SYMPTOMS
ABDOMINAL PAIN: 0
BLOOD IN STOOL: 0
COUGH: 0
DIARRHEA: 0
VOMITING: 0
CONSTIPATION: 0
BACK PAIN: 0
SHORTNESS OF BREATH: 0
NAUSEA: 0

## 2018-01-22 ASSESSMENT — PAIN DESCRIPTION - FREQUENCY
FREQUENCY: INTERMITTENT
FREQUENCY: INTERMITTENT

## 2018-01-22 ASSESSMENT — PAIN DESCRIPTION - PAIN TYPE
TYPE: ACUTE PAIN
TYPE: ACUTE PAIN

## 2018-01-22 ASSESSMENT — PAIN DESCRIPTION - ORIENTATION
ORIENTATION: LEFT
ORIENTATION: LEFT

## 2018-01-22 NOTE — CONSULTS
IF PAIN PERSISTS CALL 911 11/8/16  Yes Enid Rodriguez MD   clopidogrel (PLAVIX) 75 MG tablet Take 1 tablet by mouth daily 10/13/15  Yes Shayla Ballesteros CNP     Scheduled Meds:   sodium chloride  250 mL Intravenous Once    clopidogrel  75 mg Oral Daily    isosorbide mononitrate  30 mg Oral Daily    mometasone-formoterol  2 puff Inhalation BID    simvastatin  20 mg Oral Nightly    metoprolol tartrate  25 mg Oral BID    lisinopril  2.5 mg Oral Daily    tiotropium  18 mcg Inhalation Daily    sodium chloride flush  10 mL Intravenous 2 times per day    docusate sodium  100 mg Oral BID    enoxaparin  40 mg Subcutaneous Daily    famotidine  20 mg Oral BID    nicotine  1 patch Transdermal Daily    [START ON 1/23/2018] influenza virus vaccine  0.5 mL Intramuscular Once    aspirin  325 mg Oral Daily     Continuous Infusions:   PRN Meds:nitroGLYCERIN, albuterol sulfate HFA, sodium chloride flush, potassium chloride **OR** potassium chloride **OR** potassium chloride, potassium chloride, magnesium sulfate, acetaminophen, HYDROcodone 5 mg - acetaminophen, morphine **OR** morphine, magnesium hydroxide, bisacodyl, ondansetron    Allergies   Allergen Reactions    Phenytoin Sodium Extended Rash       SOCIAL HISTORY:   Social History     Social History    Marital status: Single     Spouse name: N/A    Number of children: N/A    Years of education: N/A     Occupational History    Not on file.      Social History Main Topics    Smoking status: Current Every Day Smoker     Packs/day: 0.50     Years: 50.00     Types: Cigarettes    Smokeless tobacco: Never Used    Alcohol use No      Comment: Recovered alcoholic, quit in 1646    Drug use: No      Comment: past history    Sexual activity: Not on file     Other Topics Concern    Not on file     Social History Narrative    No narrative on file       FAMILY HISTORY:   Family History   Problem Relation Age of Onset    High Blood Pressure Mother     Heart Disease by Courtney Gómez MD  on 1/22/2018 at 6:03 PM

## 2018-01-22 NOTE — H&P
[facility]    HISTORY AND PHYSICAL EXAMINATION            Date:   1/22/2018  Patient name:  Cecilio Walton  Date of admission:  1/22/2018 11:43 AM  MRN:   [id number]  Account:  [de-identified]  YOB: 1955  PCP:    Barbie Stein MD  Room:   9267/5455-43  Code Status:    Full Code    Chief Complaint:     Chief Complaint   Patient presents with    Chest Pain     Intermittent left sided chest pain for the past several months. Pt reports history of one stent. Pt reports he has not had a cardiac work-up in over a year, missed several appts. History Obtained From:     patient, electronic medical record    History of Present Illness: The patient is a 60 y.o.   male who presents with Chest Pain (Intermittent left sided chest pain for the past several months. Pt reports history of one stent. Pt reports he has not had a cardiac work-up in over a year, missed several appts. )  States his chest pain has been going on for several months, it comes with exertion and gets better when he is resting denies any palpitation or sweating with that. Had cardiac cath and had stent placed more than 12 years back, he does not remember his cardiologist  His hemoglobin was found to be 6.7 in ER and previously it has been higher and he does not remember the number.   States he had colonoscopy last year which was negative but he does not remember who was his gastroenterologist  He is active smoker  Denies drinking alcohol which he has quit many years back  Past Medical History:     Past Medical History:   Diagnosis Date    Alcohol withdrawal seizure ([facility] 1991    quit ETOH since [address] Blood loss anemia 2008    transfusion from chronic plavix and asa use    CAD (coronary artery disease) 2006    angioplast with drug eluting stent to l circumflex     Cardiomyopathy ([facility]     Chest pain     CKD (chronic kidney disease) 8/9/2016    COPD (chronic stools. GENITOURINARY:  negative for difficulty of urination, burning with urination, frequency   INTEGUMENT:  negative for rash, skin lesions, easy bruising   HEMATOLOGIC/LYMPHATIC:  negative for swelling/edema   ALLERGIC/IMMUNOLOGIC:  negative for urticaria , itching  ENDOCRINE:  negative increase in drinking, increase in urination, hot or cold intolerance  MUSCULOSKELETAL:  negative joint pains, muscle aches, swelling of joints  NEUROLOGICAL:  negative for headaches, dizziness, lightheadedness, numbness, pain, tingling extremities  BEHAVIOR/PSYCH:  negative for depression, anxiety    Physical Exam:   /73   Pulse 90   Temp 98.1 °F (36.7 °C) (Oral)   Resp 16   Ht 6' (1.829 m)   Wt 130 lb (59 kg)   SpO2 99%   BMI 17.63 kg/m²   Temp (24hrs), Av.4 °F (36.9 °C), Min:98.1 °F (36.7 °C), Max:98.6 °F (37 °C)    No results for input(s): POCGLU in the last 72 hours. No intake or output data in the 24 hours ending 18 [id number]    General Appearance:  alert, well appearing, and in no acute distress  Mental status: oriented to person, place, and time with normal affect, has memory gaps  Head:  normocephalic, atraumatic. Eye: no icterus, redness, pupils equal and reactive, extraocular eye movements intact, conjunctiva clear  Ear: normal external ear, no discharge, hearing intact  Nose:  no drainage noted  Mouth: mucous membranes moist  Neck: supple, no carotid bruits, thyroid not palpable  Lungs: Bilateral equal air entry, clear to ausculation, breath sounds markedly diminished at bases  Cardiovascular: normal rate, regular rhythm, no murmur, gallop, rub.   Abdomen: Soft, nontender, nondistended, normal bowel sounds, no hepatomegaly or splenomegaly  Neurologic: There are no new focal motor or sensory deficits, normal muscle tone and bulk, no abnormal sensation, normal speech, cranial nerves II through XII grossly intact  Skin: No gross lesions, rashes, bruising or bleeding on exposed skin area  Extremities: peripheral pulses palpable, no pedal edema or calf pain with palpation  Psych: normal affect    Investigations:      Laboratory Testing:  Recent Results (from the past 24 hour(s))   EKG 12 Lead    Collection Time: 01/22/18 11:50 AM   Result Value Ref Range    Ventricular Rate 98 BPM    Atrial Rate 98 BPM    P-R Interval 132 ms    QRS Duration 84 ms    Q-T Interval 338 ms    QTc Calculation (Bazett) 431 ms    P Axis 85 degrees    R Axis 73 degrees    T Axis 77 degrees   POCT troponin    Collection Time: 01/22/18 12:00 PM   Result Value Ref Range    POC Troponin I 0.01 0.00 - 0.10 ng/mL    POC Troponin Interp       The Troponin-I (POC) results cannot be compared to the Troponin-T results.    CBC    Collection Time: 01/22/18 12:06 PM   Result Value Ref Range    WBC 5.3 3.5 - 11.3 k/uL    RBC 3.75 (L) 4.21 - 5.77 m/uL    Hemoglobin 6.7 (LL) 13.0 - 17.0 g/dL    Hematocrit 26.4 (L) 40.7 - 50.3 %    MCV 70.4 (L) 82.6 - 102.9 fL    MCH 17.9 (L) 25.2 - 33.5 pg    MCHC 25.4 (L) 28.4 - 34.8 g/dL    RDW 23.1 (H) 11.8 - 14.4 %    Platelets 623 909 - 898 k/uL    MPV 9.5 8.1 - 13.5 fL    NRBC Automated 0.0 0.0 per 100 WBC   Basic Metabolic Panel    Collection Time: 01/22/18 12:06 PM   Result Value Ref Range    Glucose 98 70 - 99 mg/dL    BUN 10 8 - 23 mg/dL    CREATININE 1.20 0.70 - 1.20 mg/dL    Bun/Cre Ratio NOT REPORTED 9 - 20    Calcium 8.3 (L) 8.6 - 10.4 mg/dL    Sodium 142 135 - 144 mmol/L    Potassium 4.3 3.7 - 5.3 mmol/L    Chloride 105 98 - 107 mmol/L    CO2 24 20 - 31 mmol/L    Anion Gap 13 9 - 17 mmol/L    GFR Non-African American >60 >60 mL/min    GFR African American >60 >60 mL/min    GFR Comment          GFR Staging NOT REPORTED    Iron and TIBC    Collection Time: 01/22/18 12:06 PM   Result Value Ref Range    Iron 16 (L) 59 - 158 ug/dL    TIBC 445 250 - 450 ug/dL    Iron Saturation 4 (L) 20 - 55 %    UIBC 429 (H) 112 - 347 ug/dL   Ferritin    Collection Time: 01/22/18 12:06 PM   Result Value Ref Range    Ferritin 13 (L) 30 - 400 ug/L   TYPE AND SCREEN    Collection Time: 01/22/18 12:30 PM   Result Value Ref Range    Expiration Date 01/25/2018     Arm Band Number [id number]     ABO/Rh O POSITIVE     Antibody Screen NEGATIVE     Unit Number [id number]     Product Code Leukocyte Reduced Red Cell     Unit Divison 0     Dispense Status ISSUED     Transfusion Status OK TO TRANSFUSE     Crossmatch Result COMPATIBLE    POCT troponin    Collection Time: 01/22/18  1:53 PM   Result Value Ref Range    POC Troponin I 0.00 0.00 - 0.10 ng/mL    POC Troponin Interp       The Troponin-I (POC) results cannot be compared to the Troponin-T results. Imaging/Diagnostics:  CXR  No acute cardiopulmonary disease.  COPD.  Right apical bulla/blebs.   EKG  Normal sinus rhythm  Possible Left atrial enlargement  Nonspecific ST abnormality  Abnormal ECG  When compared with ECG of 13-OCT-2015 04:58,  No significant change was found    Assessment :      Primary Problem  Chest pain-Possibly due to anemia    Active Hospital Problems    Diagnosis Date Noted    Severe anemia [D64.9] 08/10/2017     Priority: High    Chest pain [R07.9] 04/30/2015     Priority: High    History of colon polyps [Z86.010] 08/10/2017     Priority: Medium    COPD (chronic obstructive pulmonary disease) [facility] [J44.9] 08/01/2012     Priority: Medium    CAD (coronary artery disease)--with angioplasty and drug eluting stent to left circumflex 2006 [I25.10] 01/21/2012     Priority: Medium    mild Cardiomyopathy-ischemic ejection fraction 40-45%  [I42.9] 01/21/2012     Priority: Medium    Smoker [F17.200] 01/21/2012     Priority: Medium    Personal history of malignant neoplasm of prostate [Z85.46] 01/30/2014    HTN (hypertension) [I10] 01/21/2012    Hyperlipemia [E78.5] 01/21/2012    recovered Alcoholic quit in 5316 [J50.78] 01/21/2012    Transfusion history--2008 and 2009  [Z92.89] 01/21/2012       Plan:     Patient status Admit as inpatient in the  Progressive Unit/Step

## 2018-01-22 NOTE — ED NOTES
Blood transfusion complete. Pt eating warm meal tray. No needs expressed at this time. Awaiting bed placement. Will cont to monitor.       Dayton Cheadle, RN  01/22/18 1125

## 2018-01-22 NOTE — ED NOTES
Pt resting comfortably in cot-NAD. Pt reports intermittent left sided chest pain easing up, rating @ 2/10. Pt denies any needs at this time. Will cont to monitor.       Luba Mcmanus RN  01/22/18 0248

## 2018-01-22 NOTE — CARE COORDINATION
Case Management Initial Discharge Plan  Too [place],         Readmission Risk              Readmission Risk:        13.75       Age 72 or Greater:  0    Admitted from SNF or Requires Paid or Family Care:  0    Currently has CHF,COPD,ARF,CRI,or is on dialysis:  4    Takes more than 5 Prescription Medications:  4    Takes Digoxin,Insulin,Anticoagulants,Narcotics or ASA/Plavix:  [address] in Past 12 Months:  0    On Disability:  0    Patient Considers own Health:  3.75            Met with:patient to discuss discharge plans. Information verified: address, contacts, phone number, , insurance Yes  PCP: Allyson Powell MD  Date of last visit: 6 months    Insurance Provider: medicare and medicaid    Discharge Planning  Current Residence:  Private Residence  Living Arrangements:  Family Members   Home has 2 stories/ 10 stairs to climb  Support Systems:  Family Members  Current Services PTA:  None Supplier: na  Patient able to perform ADL's:Independent  DME used to aid ambulation prior to admission: none /during admission none    Potential Assistance Needed:  N/A    Pharmacy: [facility]   Potential Assistance Purchasing Medications:  No  Does patient want to participate in local refill/ meds to beds program?  No    Patient agreeable to home care: No  [place] of choice provided:  n/a      Type of Home Care Services:  None  Patient expects to be discharged to:  home    Prior SNF/Rehab Placement and Facility: na  Agreeable to SNF/Rehab: No  [place] of choice provided: n/a   Evaluation: n/a    Expected Discharge date:      Follow Up Appointment: Best Day/ Time:      Transportation provider: pt family  Transportation arrangements needed for discharge: No    Discharge Plan: home        Electronically signed by Jessica Gresham RN on 18 at 2:44 PM

## 2018-01-22 NOTE — ED PROVIDER NOTES
(IMDUR) 30 MG extended release tablet Take 1 tablet by mouth daily 12/9/16   Amadeo Reis MD   nitroGLYCERIN (NITROSTAT) 0.3 MG SL tablet DISSOLVE 1 TABLET UNDER THE TONGUE EVERY 5 MINUTES AS NEEDED FOR CHEST PAIN UNTIL RELIEF IS OBTAINED. IF PAIN PERSISTS CALL 911 11/8/16   Amadeo Reis MD   clopidogrel (PLAVIX) 75 MG tablet Take 1 tablet by mouth daily 10/13/15   Nancy Reynolds, CNP       REVIEW OF SYSTEMS    (2-9 systems for level 4, 10 or more for level 5)      Review of Systems   Constitutional: Negative for chills, diaphoresis and fever. HENT: Negative for congestion. Eyes: Negative for visual disturbance. Respiratory: Negative for cough and shortness of breath. Cardiovascular: Positive for chest pain. Gastrointestinal: Negative for abdominal pain, blood in stool, constipation, diarrhea, nausea and vomiting. Genitourinary: Negative for decreased urine volume. Musculoskeletal: Negative for back pain. Skin: Negative for wound. Allergic/Immunologic: Negative for immunocompromised state. Neurological: Negative for syncope, weakness, light-headedness and headaches. Hematological: Does not bruise/bleed easily. PHYSICAL EXAM   (up to 7 for level 4, 8 or more for level 5)      INITIAL VITALS:   /83   Pulse 74   Temp 98.5 °F (36.9 °C) (Oral)   Resp 16   Ht 6' (1.829 m)   Wt 130 lb (59 kg)   SpO2 100%   BMI 17.63 kg/m²     Physical Exam   Constitutional: He is oriented to person, place, and time. He appears well-developed and well-nourished. No distress. HENT:   Head: Normocephalic and atraumatic. Nose: Nose normal.   Eyes: Conjunctivae and EOM are normal. Right eye exhibits no discharge. Left eye exhibits no discharge. No scleral icterus. Neck: Normal range of motion. No JVD present. No tracheal deviation present. Cardiovascular: Normal rate, regular rhythm and normal heart sounds.     Pulmonary/Chest: Effort normal and breath sounds normal. No respiratory interpreted by the Emergency Department Physician who either signs or Co-signs this chart in the absence of a cardiologist.        PROCEDURES:  None    CONSULTS:  IP CONSULT TO INTERNAL MEDICINE  IP CONSULT TO HOSPITALIST    CRITICAL CARE:  None    FINAL IMPRESSION      1. Chest pain, unspecified type    2. Anemia, unspecified type          DISPOSITION / PLAN     DISPOSITION    Admitted      PATIENT REFERRED TO:  No follow-up provider specified.     DISCHARGE MEDICATIONS:  New Prescriptions    No medications on file       Shawna Carmichael MD  Emergency Medicine Resident    (Please note that portions of this note were completed with a voice recognition program.  Efforts were made to edit the dictations but occasionally words are mis-transcribed.)       Shawna Carmichael MD  Resident  01/22/18 6239

## 2018-01-22 NOTE — ED NOTES
Pt resting comfortably in cot, denies any needs at this time. Pt updated on POC and duration of stay. Will cont to monitor.       Maki Mcghee, KAMALA  01/22/18 3341

## 2018-01-23 ENCOUNTER — ANESTHESIA (OUTPATIENT)
Dept: ENDOSCOPY | Age: 63
DRG: 812 | End: 2018-01-23
Payer: MEDICARE

## 2018-01-23 ENCOUNTER — ANESTHESIA EVENT (OUTPATIENT)
Dept: ENDOSCOPY | Age: 63
DRG: 812 | End: 2018-01-23
Payer: MEDICARE

## 2018-01-23 VITALS
SYSTOLIC BLOOD PRESSURE: 101 MMHG | DIASTOLIC BLOOD PRESSURE: 73 MMHG | RESPIRATION RATE: 22 BRPM | OXYGEN SATURATION: 100 %

## 2018-01-23 LAB
ALBUMIN SERPL-MCNC: 3.5 G/DL (ref 3.5–5.2)
ALBUMIN/GLOBULIN RATIO: 1.3 (ref 1–2.5)
ALP BLD-CCNC: 53 U/L (ref 40–129)
ALT SERPL-CCNC: 7 U/L (ref 5–41)
ANION GAP SERPL CALCULATED.3IONS-SCNC: 15 MMOL/L (ref 9–17)
AST SERPL-CCNC: 13 U/L
BILIRUB SERPL-MCNC: 0.58 MG/DL (ref 0.3–1.2)
BUN BLDV-MCNC: 10 MG/DL (ref 8–23)
BUN/CREAT BLD: ABNORMAL (ref 9–20)
CALCIUM SERPL-MCNC: 8.4 MG/DL (ref 8.6–10.4)
CHLORIDE BLD-SCNC: 104 MMOL/L (ref 98–107)
CHOLESTEROL/HDL RATIO: 3.3
CHOLESTEROL: 101 MG/DL
CO2: 22 MMOL/L (ref 20–31)
CREAT SERPL-MCNC: 0.98 MG/DL (ref 0.7–1.2)
DATE, STOOL #1: ABNORMAL
DATE, STOOL #2: ABNORMAL
DATE, STOOL #3: ABNORMAL
GFR AFRICAN AMERICAN: >60 ML/MIN
GFR NON-AFRICAN AMERICAN: >60 ML/MIN
GFR SERPL CREATININE-BSD FRML MDRD: ABNORMAL ML/MIN/{1.73_M2}
GFR SERPL CREATININE-BSD FRML MDRD: ABNORMAL ML/MIN/{1.73_M2}
GLUCOSE BLD-MCNC: 74 MG/DL (ref 70–99)
HCT VFR BLD CALC: 25.7 % (ref 40.7–50.3)
HDLC SERPL-MCNC: 31 MG/DL
HEMOCCULT SP1 STL QL: POSITIVE
HEMOCCULT SP2 STL QL: ABNORMAL
HEMOCCULT SP3 STL QL: ABNORMAL
HEMOGLOBIN: 6.9 G/DL (ref 13–17)
HEMOGLOBIN: 8 G/DL (ref 13–17)
HEMOGLOBIN: 8.4 G/DL (ref 13–17)
LDL CHOLESTEROL: 54 MG/DL (ref 0–130)
MCH RBC QN AUTO: 19.1 PG (ref 25.2–33.5)
MCHC RBC AUTO-ENTMCNC: 26.8 G/DL (ref 28.4–34.8)
MCV RBC AUTO: 71.2 FL (ref 82.6–102.9)
NRBC AUTOMATED: 0 PER 100 WBC
PDW BLD-RTO: 24.5 % (ref 11.8–14.4)
PLATELET # BLD: 382 K/UL (ref 138–453)
PMV BLD AUTO: 9.3 FL (ref 8.1–13.5)
POTASSIUM SERPL-SCNC: 4 MMOL/L (ref 3.7–5.3)
RBC # BLD: 3.61 M/UL (ref 4.21–5.77)
SODIUM BLD-SCNC: 141 MMOL/L (ref 135–144)
TIME, STOOL #1: ABNORMAL
TIME, STOOL #2: ABNORMAL
TIME, STOOL #3: ABNORMAL
TOTAL PROTEIN: 6.2 G/DL (ref 6.4–8.3)
TRIGL SERPL-MCNC: 79 MG/DL
VLDLC SERPL CALC-MCNC: ABNORMAL MG/DL (ref 1–30)
WBC # BLD: 6.6 K/UL (ref 3.5–11.3)

## 2018-01-23 PROCEDURE — 6360000002 HC RX W HCPCS: Performed by: INTERNAL MEDICINE

## 2018-01-23 PROCEDURE — G0008 ADMIN INFLUENZA VIRUS VAC: HCPCS | Performed by: INTERNAL MEDICINE

## 2018-01-23 PROCEDURE — 36415 COLL VENOUS BLD VENIPUNCTURE: CPT

## 2018-01-23 PROCEDURE — 80061 LIPID PANEL: CPT

## 2018-01-23 PROCEDURE — 94762 N-INVAS EAR/PLS OXIMTRY CONT: CPT

## 2018-01-23 PROCEDURE — 80053 COMPREHEN METABOLIC PANEL: CPT

## 2018-01-23 PROCEDURE — 6370000000 HC RX 637 (ALT 250 FOR IP): Performed by: INTERNAL MEDICINE

## 2018-01-23 PROCEDURE — G0328 FECAL BLOOD SCRN IMMUNOASSAY: HCPCS

## 2018-01-23 PROCEDURE — P9016 RBC LEUKOCYTES REDUCED: HCPCS

## 2018-01-23 PROCEDURE — 94640 AIRWAY INHALATION TREATMENT: CPT

## 2018-01-23 PROCEDURE — 86900 BLOOD TYPING SEROLOGIC ABO: CPT

## 2018-01-23 PROCEDURE — 3609012400 HC EGD TRANSORAL BIOPSY SINGLE/MULTIPLE: Performed by: INTERNAL MEDICINE

## 2018-01-23 PROCEDURE — 7100000011 HC PHASE II RECOVERY - ADDTL 15 MIN: Performed by: INTERNAL MEDICINE

## 2018-01-23 PROCEDURE — 3700000000 HC ANESTHESIA ATTENDED CARE: Performed by: INTERNAL MEDICINE

## 2018-01-23 PROCEDURE — 2580000003 HC RX 258: Performed by: SPECIALIST

## 2018-01-23 PROCEDURE — 7100000010 HC PHASE II RECOVERY - FIRST 15 MIN: Performed by: INTERNAL MEDICINE

## 2018-01-23 PROCEDURE — 0DBN8ZX EXCISION OF SIGMOID COLON, VIA NATURAL OR ARTIFICIAL OPENING ENDOSCOPIC, DIAGNOSTIC: ICD-10-PCS | Performed by: INTERNAL MEDICINE

## 2018-01-23 PROCEDURE — 99232 SBSQ HOSP IP/OBS MODERATE 35: CPT | Performed by: INTERNAL MEDICINE

## 2018-01-23 PROCEDURE — 3700000001 HC ADD 15 MINUTES (ANESTHESIA): Performed by: INTERNAL MEDICINE

## 2018-01-23 PROCEDURE — 90686 IIV4 VACC NO PRSV 0.5 ML IM: CPT | Performed by: INTERNAL MEDICINE

## 2018-01-23 PROCEDURE — 88305 TISSUE EXAM BY PATHOLOGIST: CPT

## 2018-01-23 PROCEDURE — 1200000000 HC SEMI PRIVATE

## 2018-01-23 PROCEDURE — 36430 TRANSFUSION BLD/BLD COMPNT: CPT

## 2018-01-23 PROCEDURE — 3609010300 HC COLONOSCOPY W/BIOPSY SINGLE/MULTIPLE: Performed by: INTERNAL MEDICINE

## 2018-01-23 PROCEDURE — 0DB68ZX EXCISION OF STOMACH, VIA NATURAL OR ARTIFICIAL OPENING ENDOSCOPIC, DIAGNOSTIC: ICD-10-PCS | Performed by: INTERNAL MEDICINE

## 2018-01-23 PROCEDURE — 2580000003 HC RX 258: Performed by: INTERNAL MEDICINE

## 2018-01-23 PROCEDURE — 85027 COMPLETE CBC AUTOMATED: CPT

## 2018-01-23 PROCEDURE — 85018 HEMOGLOBIN: CPT

## 2018-01-23 PROCEDURE — 6360000002 HC RX W HCPCS: Performed by: SPECIALIST

## 2018-01-23 RX ORDER — SODIUM CHLORIDE 9 MG/ML
INJECTION, SOLUTION INTRAVENOUS CONTINUOUS PRN
Status: DISCONTINUED | OUTPATIENT
Start: 2018-01-23 | End: 2018-01-23 | Stop reason: SDUPTHER

## 2018-01-23 RX ORDER — 0.9 % SODIUM CHLORIDE 0.9 %
250 INTRAVENOUS SOLUTION INTRAVENOUS ONCE
Status: DISCONTINUED | OUTPATIENT
Start: 2018-01-23 | End: 2018-01-24 | Stop reason: HOSPADM

## 2018-01-23 RX ORDER — PROPOFOL 10 MG/ML
INJECTION, EMULSION INTRAVENOUS PRN
Status: DISCONTINUED | OUTPATIENT
Start: 2018-01-23 | End: 2018-01-23 | Stop reason: SDUPTHER

## 2018-01-23 RX ADMIN — ISOSORBIDE MONONITRATE 30 MG: 30 TABLET ORAL at 08:57

## 2018-01-23 RX ADMIN — Medication 10 ML: at 20:32

## 2018-01-23 RX ADMIN — METOPROLOL TARTRATE 25 MG: 25 TABLET ORAL at 08:57

## 2018-01-23 RX ADMIN — CLOPIDOGREL 75 MG: 75 TABLET, FILM COATED ORAL at 08:57

## 2018-01-23 RX ADMIN — DOCUSATE SODIUM 100 MG: 100 CAPSULE ORAL at 20:32

## 2018-01-23 RX ADMIN — METOPROLOL TARTRATE 25 MG: 25 TABLET ORAL at 20:32

## 2018-01-23 RX ADMIN — LISINOPRIL 2.5 MG: 2.5 TABLET ORAL at 08:57

## 2018-01-23 RX ADMIN — PROPOFOL 50 MG: 10 INJECTION, EMULSION INTRAVENOUS at 12:20

## 2018-01-23 RX ADMIN — SIMVASTATIN 20 MG: 20 TABLET, FILM COATED ORAL at 20:32

## 2018-01-23 RX ADMIN — PROPOFOL 50 MG: 10 INJECTION, EMULSION INTRAVENOUS at 12:24

## 2018-01-23 RX ADMIN — SODIUM CHLORIDE: 9 INJECTION, SOLUTION INTRAVENOUS at 12:13

## 2018-01-23 RX ADMIN — PROPOFOL 50 MG: 10 INJECTION, EMULSION INTRAVENOUS at 12:26

## 2018-01-23 RX ADMIN — DOCUSATE SODIUM 100 MG: 100 CAPSULE ORAL at 08:57

## 2018-01-23 RX ADMIN — MOMETASONE FUROATE AND FORMOTEROL FUMARATE DIHYDRATE 2 PUFF: 200; 5 AEROSOL RESPIRATORY (INHALATION) at 07:34

## 2018-01-23 RX ADMIN — MOMETASONE FUROATE AND FORMOTEROL FUMARATE DIHYDRATE 2 PUFF: 200; 5 AEROSOL RESPIRATORY (INHALATION) at 20:50

## 2018-01-23 RX ADMIN — PROPOFOL 50 MG: 10 INJECTION, EMULSION INTRAVENOUS at 12:30

## 2018-01-23 RX ADMIN — INFLUENZA VIRUS VACCINE 0.5 ML: 15; 15; 15; 15 SUSPENSION INTRAMUSCULAR at 08:57

## 2018-01-23 RX ADMIN — IRON SUCROSE 200 MG: 20 INJECTION, SOLUTION INTRAVENOUS at 15:46

## 2018-01-23 RX ADMIN — TIOTROPIUM BROMIDE 18 MCG: 18 CAPSULE ORAL; RESPIRATORY (INHALATION) at 07:34

## 2018-01-23 RX ADMIN — FAMOTIDINE 20 MG: 20 TABLET, FILM COATED ORAL at 08:57

## 2018-01-23 RX ADMIN — ASPIRIN 325 MG: 325 TABLET, COATED ORAL at 08:57

## 2018-01-23 RX ADMIN — PROPOFOL 50 MG: 10 INJECTION, EMULSION INTRAVENOUS at 12:19

## 2018-01-23 RX ADMIN — FAMOTIDINE 20 MG: 20 TABLET, FILM COATED ORAL at 20:32

## 2018-01-23 ASSESSMENT — PAIN DESCRIPTION - ORIENTATION
ORIENTATION: LEFT

## 2018-01-23 ASSESSMENT — PAIN DESCRIPTION - DESCRIPTORS
DESCRIPTORS: ACHING;DULL

## 2018-01-23 ASSESSMENT — PAIN DESCRIPTION - FREQUENCY
FREQUENCY: INTERMITTENT

## 2018-01-23 ASSESSMENT — PAIN DESCRIPTION - LOCATION
LOCATION: CHEST

## 2018-01-23 ASSESSMENT — PAIN SCALES - GENERAL
PAINLEVEL_OUTOF10: 0

## 2018-01-23 ASSESSMENT — PAIN - FUNCTIONAL ASSESSMENT: PAIN_FUNCTIONAL_ASSESSMENT: 0-10

## 2018-01-23 ASSESSMENT — PAIN DESCRIPTION - PAIN TYPE
TYPE: ACUTE PAIN

## 2018-01-23 NOTE — PROGRESS NOTES
Cardiac Testing     STRESS 1/10/17: No ischemia. Re-demonstration of apical/apical-inferior wall infarct. EF 43%. ECHO 8/3/16: EF 35-40%, no valvular abnormalities. CATH 10/12/15: Patent LCx stent with some disease proximal to it. Minimal LAD disease. EF 40%. 1. CAD, S/P drug-eluting stent using 3.5 x 16 mm Taxus stent to the proximal left circumflex in October 2006. There was 60% branch vessel diagonal stenosis, otherwise no significant CAD. 2. Preserved LV systolic function, EF of around 50% on echo and nuclear stress test.   3. Repeat cardiac cath done in October 2015 showed widely patent left circumflex stent with 40-50% stenosis proximal to the stent and moderate RCA disease. Medical treatment was advised. 4. Repeat echocardiogram done in August 2016 showed left ventricular systolic function of 46-60% with possible segmental wall motion abnormalities. 5. Hypertension. 6. Hyperlipidemia. 7. Chronic smoking, trying to quit, smokes 1-2 cigarettes per day. 8. Normal peripheral vascular studies with no evidence of peripheral vascular disease in 2012. 9. Nuclear stress test done in January 2017 showed apical and apical inferior wall infarct with no ischemia and mildly reduced LV systolic function, ejection fraction of 43%.  No change from his previous nuclear stress test.

## 2018-01-23 NOTE — PROGRESS NOTES
[facility]    Progress Note    1/23/2018    8:14 AM    Name:   Sally Colbert  MRN:     [id number]     Acct:      [de-identified]   Room:   [address] [place] [address] Day:  0  Admit Date:  1/22/2018 11:43 AM    PCP:   Jacquelyn Arias MD  Code Status:  Full Code    Subjective:     C/C:   Chief Complaint   Patient presents with    Chest Pain     Intermittent left sided chest pain for the past several months. Pt reports history of one stent. Pt reports he has not had a cardiac work-up in over a year, missed several appts. Interval History Status: improved. Feeling better than yesterday  Denies chest pain  Getting one more unit of PRBC  Stool occult blood positive  Scheduled for colonoscopy today  Brief History:   The patient is a 60 y.o.   male who presents with Chest Pain (Intermittent left sided chest pain for the past several months. Pt reports history of one stent. Pt reports he has not had a cardiac work-up in over a year, missed several appts. )  States his chest pain has been going on for several months, it comes with exertion and gets better when he is resting denies any palpitation or sweating with that. Had cardiac cath and had stent placed more than 12 years back, he does not remember his cardiologist  His hemoglobin was found to be 6.7 in ER and previously it has been higher and he does not remember the number.   States he had colonoscopy last year which was negative but he does not remember who was his gastroenterologist  He is active smoker  Denies drinking alcohol which he has quit many years back      Review of Systems:     Constitutional:  negative for chills, fevers, sweats  Respiratory:  negative for cough, dyspnea on exertion, hemoptysis,+ shortness of breath on exertion, denies wheezing  Cardiovascular:  negative for chest pain, chest pressure/discomfort, lower extremity edema, palpitations  Gastrointestinal:  negative for abdominal

## 2018-01-23 NOTE — CONSULTS
[facility]  In Patient Cardiology Consult             Date:   1/23/2018  Patient name: Miguelina Morales  Date of admission:  1/22/2018 11:43 AM  MRN:   [id number]  YOB: 1955    Reason for Admission: Chest pain, Anemia     CHIEF COMPLAINT: Chest pain     History Obtained From: Patient, EPIC     HISTORY OF PRESENT ILLNESS:      Padmaja Gotti is a 60year old  male admitted with chest pain. In the ER he was found to have a Hemoglobin of 6.7. He admits to feeling sob, dizziness, lightheaded. He also admits to dark stools. Denies any palpitations, orthopnea, pnd.   States his chest pain has mostly resolved after getting blood. Past Medical History:   has a past medical history of Alcohol withdrawal seizure ([address] Blood loss anemia; CAD (coronary artery disease); Cardiomyopathy ([address] Chest pain; CKD (chronic kidney disease); COPD (chronic obstructive pulmonary disease) ([address] Cough; Erectile dysfunction; History of transfusion of packed red blood cells; Hyperlipidemia; Hypertension; Nicotine dependence; Primary adenocarcinoma of prostate ([address] Wears dentures; and Wears glasses. 1. CAD, S/P drug-eluting stent using 3.5 x 16 mm Taxus stent to the proximal left circumflex in October 2006. There was 60% branch vessel diagonal stenosis, otherwise no significant CAD. 2. Preserved LV systolic function, EF of around 50% on echo and nuclear stress test.   3. Repeat cardiac cath done in October 2015 showed widely patent left circumflex stent with 40-50% stenosis proximal to the stent and moderate RCA disease. Medical treatment was advised. 4. Repeat echocardiogram done in August 2016 showed left ventricular systolic function of 34-11% with possible segmental wall motion abnormalities. 5. Hypertension. 6. Hyperlipidemia. 7. Chronic smoking, trying to quit, smokes 1-2 cigarettes per day.    8. Normal peripheral vascular studies with no evidence of peripheral vascular OBTAINED. IF PAIN PERSISTS CALL 911 11/8/16  Yes Hoyle Barthel, MD   clopidogrel (PLAVIX) 75 MG tablet Take 1 tablet by mouth daily 10/13/15  Yes Christi Abrams CNP       Allergies:  Phenytoin sodium extended    Social History:   reports that he has been smoking Cigarettes. He has a 25.00 pack-year smoking history. He has never used smokeless tobacco. He reports that he does not drink alcohol or use drugs. Family History:   neg for early CAD    REVIEW OF SYSTEMS:    · Constitutional: there has been no unanticipated weight loss. There's been No change in energy level, No change in activity level. · Eyes: No visual changes or diplopia. No scleral icterus. · ENT: No Headaches, hearing loss or vertigo. No mouth sores or sore throat. · Cardiovascular: As HPI  · Respiratory: As HPI  · Gastrointestinal: No abdominal pain, appetite loss, blood in stools. No change in bowel or bladder habits. · Genitourinary: No dysuria, trouble voiding, or hematuria. · Musculoskeletal:  No gait disturbance, No weakness or joint complaints. · Integumentary: No rash or pruritis. · Neurological: No headache, diplopia, change in muscle strength, numbness or tingling. No change in gait, balance, coordination, mood, affect, memory, mentation, behavior. · Psychiatric: No anxiety, or depression. · Endocrine: No temperature intolerance. No excessive thirst, fluid intake, or urination. No tremor. · Hematologic/Lymphatic: No abnormal bruising or bleeding, blood clots or swollen lymph nodes. · Allergic/Immunologic: No nasal congestion or hives. PHYSICAL EXAM:    Physical Examination:    /82   Pulse 81   Temp 98.7 °F (37.1 °C) (Oral)   Resp 16   Ht 6' (1.829 m)   Wt 131 lb 6.4 oz (59.6 kg)   SpO2 98%   BMI 17.82 kg/m²    Constitutional and General Appearance: alert, cooperative, no distress and appears stated age  HEENT: PERRL, no cervical lymphadenopathy. No masses palpable.  Normal oral

## 2018-01-23 NOTE — PROGRESS NOTES
Awake.  Abdomen soft non distended. denies pain. Skin warm and dry. No further coughing. Discharge to floor. Report called.

## 2018-01-23 NOTE — PLAN OF CARE
Problem: Falls - Risk of  Goal: Absence of falls  Outcome: Ongoing  Pt remains free from falls at this time. Floor free from obstacles, and bed is locked and in lowest position. Adequate lighting provided. Call light within reach; pt encouraged to call before getting OOB for any need. Will continue to monitor needs during hourly rounding.

## 2018-01-23 NOTE — ANESTHESIA PRE PROCEDURE
Department of Anesthesiology  Preprocedure Note       Name:  Vivian Reyes   Age:  60 y.o.  :  1955                                          MRN:  [id number]         Date:  2018      Surgeon: Gayathri Rogel):  Theone Sandhoff, MD    Procedure: Procedure(s):  COLONOSCOPY  EGD DIAGNOSTIC ONLY    Medications prior to admission:   Prior to Admission medications    Medication Sig Start Date End Date Taking? Authorizing Provider   aspirin 325 MG tablet Take 325 mg by mouth daily   Yes Historical Provider, MD Trevor Dowling 18 MCG inhalation capsule INHALE CONTENTS OF ONE CAPSULE INTO THE LUNGS EVERY DAY 17  Yes Sushil Wood MD   albuterol sulfate HFA (PROAIR HFA) 108 (90 Base) MCG/ACT inhaler Inhale 2 puffs into the lungs every 6 hours as needed for Wheezing 17  Yes Bernarda Rivers MD   budesonide-formoterol [facility]) 80-4.5 MCG/ACT AERO Inhale 2 puffs into the lungs 2 times daily 17  Yes Bernarda Rivers MD   lovastatin (MEVACOR) 40 MG tablet TAKE 1 TABLET BY MOUTH EVERY EVENING 17  Yes Bernarda Rivers MD   metoprolol tartrate (LOPRESSOR) 25 MG tablet TAKE 1 TABLET BY MOUTH TWICE DAILY 17  Yes Bernarda Rivers MD   lisinopril (PRINIVIL;ZESTRIL) 2.5 MG tablet TAKE 1 TABLET BY MOUTH DAILY 17  Yes Bernarda Rivers MD   isosorbide mononitrate (IMDUR) 30 MG extended release tablet Take 1 tablet by mouth daily 16  Yes Bernarda Rivers MD   nitroGLYCERIN (NITROSTAT) 0.3 MG SL tablet DISSOLVE 1 TABLET UNDER THE TONGUE EVERY 5 MINUTES AS NEEDED FOR CHEST PAIN UNTIL RELIEF IS OBTAINED.  IF PAIN PERSISTS CALL 911 16  Yes Bernarda Rivers MD   clopidogrel (PLAVIX) 75 MG tablet Take 1 tablet by mouth daily 10/13/15  Yes Jessica Hugo CNP       Current medications:    Current Facility-Administered Medications   Medication Dose Route Frequency Provider Last Rate Last Dose    0.9 % sodium chloride infusion 250 mL  250 mL Intravenous Once Rina Gonzalez CNP       

## 2018-01-24 ENCOUNTER — APPOINTMENT (OUTPATIENT)
Dept: CT IMAGING | Age: 63
DRG: 812 | End: 2018-01-24
Payer: MEDICARE

## 2018-01-24 VITALS
WEIGHT: 131.4 LBS | TEMPERATURE: 98.4 F | BODY MASS INDEX: 17.8 KG/M2 | OXYGEN SATURATION: 100 % | SYSTOLIC BLOOD PRESSURE: 134 MMHG | RESPIRATION RATE: 16 BRPM | DIASTOLIC BLOOD PRESSURE: 85 MMHG | HEIGHT: 72 IN | HEART RATE: 77 BPM

## 2018-01-24 LAB
ABO/RH: NORMAL
ANION GAP SERPL CALCULATED.3IONS-SCNC: 10 MMOL/L (ref 9–17)
ANTIBODY SCREEN: NEGATIVE
ARM BAND NUMBER: NORMAL
BLD PROD TYP BPU: NORMAL
BUN BLDV-MCNC: 9 MG/DL (ref 8–23)
BUN/CREAT BLD: NORMAL (ref 9–20)
CALCIUM SERPL-MCNC: 8.9 MG/DL (ref 8.6–10.4)
CHLORIDE BLD-SCNC: 105 MMOL/L (ref 98–107)
CO2: 25 MMOL/L (ref 20–31)
CREAT SERPL-MCNC: 1.14 MG/DL (ref 0.7–1.2)
CROSSMATCH RESULT: NORMAL
DISPENSE STATUS BLOOD BANK: NORMAL
EXPIRATION DATE: NORMAL
GFR AFRICAN AMERICAN: >60 ML/MIN
GFR NON-AFRICAN AMERICAN: >60 ML/MIN
GFR SERPL CREATININE-BSD FRML MDRD: NORMAL ML/MIN/{1.73_M2}
GFR SERPL CREATININE-BSD FRML MDRD: NORMAL ML/MIN/{1.73_M2}
GLUCOSE BLD-MCNC: 77 MG/DL (ref 70–99)
HCT VFR BLD CALC: 31.6 % (ref 40.7–50.3)
HEMOGLOBIN: 8.8 G/DL (ref 13–17)
HEMOGLOBIN: 9.6 G/DL (ref 13–17)
MCH RBC QN AUTO: 20.5 PG (ref 25.2–33.5)
MCHC RBC AUTO-ENTMCNC: 27.8 G/DL (ref 28.4–34.8)
MCV RBC AUTO: 73.5 FL (ref 82.6–102.9)
NRBC AUTOMATED: 0 PER 100 WBC
PDW BLD-RTO: 24.5 % (ref 11.8–14.4)
PLATELET # BLD: 342 K/UL (ref 138–453)
PMV BLD AUTO: 9.2 FL (ref 8.1–13.5)
POTASSIUM SERPL-SCNC: 3.9 MMOL/L (ref 3.7–5.3)
RBC # BLD: 4.3 M/UL (ref 4.21–5.77)
SODIUM BLD-SCNC: 140 MMOL/L (ref 135–144)
SURGICAL PATHOLOGY REPORT: NORMAL
TRANSFUSION STATUS: NORMAL
UNIT DIVISION: 0
UNIT NUMBER: NORMAL
WBC # BLD: 8.4 K/UL (ref 3.5–11.3)

## 2018-01-24 PROCEDURE — 6360000004 HC RX CONTRAST MEDICATION: Performed by: INTERNAL MEDICINE

## 2018-01-24 PROCEDURE — 2580000003 HC RX 258: Performed by: INTERNAL MEDICINE

## 2018-01-24 PROCEDURE — 94762 N-INVAS EAR/PLS OXIMTRY CONT: CPT

## 2018-01-24 PROCEDURE — 36415 COLL VENOUS BLD VENIPUNCTURE: CPT

## 2018-01-24 PROCEDURE — 74177 CT ABD & PELVIS W/CONTRAST: CPT

## 2018-01-24 PROCEDURE — 6370000000 HC RX 637 (ALT 250 FOR IP): Performed by: INTERNAL MEDICINE

## 2018-01-24 PROCEDURE — 85027 COMPLETE CBC AUTOMATED: CPT

## 2018-01-24 PROCEDURE — 99239 HOSP IP/OBS DSCHRG MGMT >30: CPT | Performed by: INTERNAL MEDICINE

## 2018-01-24 PROCEDURE — 85018 HEMOGLOBIN: CPT

## 2018-01-24 PROCEDURE — 94640 AIRWAY INHALATION TREATMENT: CPT

## 2018-01-24 PROCEDURE — 80048 BASIC METABOLIC PNL TOTAL CA: CPT

## 2018-01-24 PROCEDURE — 6360000002 HC RX W HCPCS: Performed by: INTERNAL MEDICINE

## 2018-01-24 RX ORDER — FAMOTIDINE 20 MG/1
20 TABLET, FILM COATED ORAL 2 TIMES DAILY
Qty: 60 TABLET | Refills: 0 | Status: SHIPPED | OUTPATIENT
Start: 2018-01-24 | End: 2018-01-30 | Stop reason: SDUPTHER

## 2018-01-24 RX ORDER — LANOLIN ALCOHOL/MO/W.PET/CERES
325 CREAM (GRAM) TOPICAL 3 TIMES DAILY
Qty: 90 TABLET | Refills: 0 | Status: SHIPPED | OUTPATIENT
Start: 2018-01-24 | End: 2018-01-30 | Stop reason: SDUPTHER

## 2018-01-24 RX ORDER — ACETAMINOPHEN 325 MG/1
650 TABLET ORAL EVERY 4 HOURS PRN
Qty: 20 TABLET | Refills: 0 | Status: SHIPPED | OUTPATIENT
Start: 2018-01-24 | End: 2019-04-16 | Stop reason: ALTCHOICE

## 2018-01-24 RX ADMIN — FAMOTIDINE 20 MG: 20 TABLET, FILM COATED ORAL at 09:17

## 2018-01-24 RX ADMIN — LISINOPRIL 2.5 MG: 2.5 TABLET ORAL at 09:17

## 2018-01-24 RX ADMIN — METOPROLOL TARTRATE 25 MG: 25 TABLET ORAL at 09:17

## 2018-01-24 RX ADMIN — TIOTROPIUM BROMIDE 18 MCG: 18 CAPSULE ORAL; RESPIRATORY (INHALATION) at 08:18

## 2018-01-24 RX ADMIN — Medication 10 ML: at 09:17

## 2018-01-24 RX ADMIN — MOMETASONE FUROATE AND FORMOTEROL FUMARATE DIHYDRATE 2 PUFF: 200; 5 AEROSOL RESPIRATORY (INHALATION) at 08:19

## 2018-01-24 RX ADMIN — IRON SUCROSE 200 MG: 20 INJECTION, SOLUTION INTRAVENOUS at 10:36

## 2018-01-24 RX ADMIN — IOPAMIDOL 75 ML: 755 INJECTION, SOLUTION INTRAVENOUS at 13:26

## 2018-01-24 RX ADMIN — IOHEXOL 50 ML: 240 INJECTION, SOLUTION INTRATHECAL; INTRAVASCULAR; INTRAVENOUS; ORAL at 10:59

## 2018-01-24 RX ADMIN — ISOSORBIDE MONONITRATE 30 MG: 30 TABLET ORAL at 09:17

## 2018-01-24 NOTE — PROGRESS NOTES
[facility]   Progress Note                   Date:   1/24/2018  Patient name: Zaid Kuhn  Date of admission:  1/22/2018 11:43 AM  MRN:   [id number]  YOB: 1955  PCP: Wyatt Summers MD    Reason for Admission: Chest pain [R07.9]  Chest pain [R07.9]    Subjective:       Clinical Changes / Abnormalities: Patient seen & examined. Denies CP or SOB. EGD/colonoscopy yesterday showed no active bleeding. Reviewed labs - hgb stable. Denies any further signs of bleeding. ROS    Medications:   Scheduled Meds:   0.9 % sodium chloride  250 mL Intravenous Once    sodium chloride  250 mL Intravenous Once    iron sucrose  200 mg Intravenous Daily    isosorbide mononitrate  30 mg Oral Daily    mometasone-formoterol  2 puff Inhalation BID    simvastatin  20 mg Oral Nightly    metoprolol tartrate  25 mg Oral BID    lisinopril  2.5 mg Oral Daily    sodium chloride flush  10 mL Intravenous 2 times per day    docusate sodium  100 mg Oral BID    famotidine  20 mg Oral BID    nicotine  1 patch Transdermal Daily    tiotropium  18 mcg Inhalation Daily     Continuous Infusions:   CBC:   Recent Labs      01/22/18   1206   01/23/18   0608  01/23/18   2208  01/23/18   2339  01/24/18   0702   WBC  5.3   --   6.6   --    --   8.4   HGB  6.7*   < >  6.9*  8.0*  8.4*  8.8*   PLT  397   --   382   --    --   342    < > = values in this interval not displayed. BMP:    Recent Labs      01/22/18   1206  01/23/18   0608  01/24/18   0702   NA  142  141  140   K  4.3  4.0  3.9   CL  105  104  105   CO2  24  22  25   BUN  10  10  9   CREATININE  1.20  0.98  1.14   GLUCOSE  98  74  77     Hepatic:   Recent Labs      01/23/18   0608   AST  13   ALT  7   BILITOT  0.58   ALKPHOS  53     Troponin:   Recent Labs      01/22/18   1200  01/22/18   1353   TROPONINI  0.01  0.00     BNP: No results for input(s): BNP in the last 72 hours.   Lipids:   Recent Labs      01/23/18   0608   CHOL  101   HDL  31*     INR:

## 2018-01-24 NOTE — DISCHARGE SUMMARY
interventions: PRBC transfusions. Significant Diagnostic Studies:   CXR  No acute cardiopulmonary disease.  COPD.  Right apical bulla/blebs. EKG  Normal sinus rhythm  Possible Left atrial enlargement  Nonspecific ST abnormality  Abnormal ECG  When compared with ECG of 13-OCT-2015 04:58,  No significant change was found  Stress test   patient is intermediate risk based on Nuclear stress in 1/2017- revealed no Ischemic EF 43%     Colonoscopy  1. A 3mm sessile polyp was found in the rectosigmoid colon- removed with biopsy forceps  2. Non-bleeding small internal hemorrhoids were found on retroflexion  EGD  Esophagus: no gross lesions. Regular Z-line at 42cm. Stomach: Edematous nodular mucosa in gastric body- biopsies taken.  No gross lesions in gastric antrum  Duodenum: no gross lesions noted in examined portion of duodenum.      Labs / Micro:  CBC:   Lab Results   Component Value Date    WBC 8.4 01/24/2018    RBC 4.30 01/24/2018    HGB 9.6 01/24/2018    HCT 31.6 01/24/2018    MCV 73.5 01/24/2018    MCH 20.5 01/24/2018    MCHC 27.8 01/24/2018    RDW 24.5 01/24/2018     01/24/2018     BMP:    Lab Results   Component Value Date    GLUCOSE 77 01/24/2018     01/24/2018    K 3.9 01/24/2018     01/24/2018    CO2 25 01/24/2018    ANIONGAP 10 01/24/2018    BUN 9 01/24/2018    CREATININE 1.14 01/24/2018    BUNCRER NOT REPORTED 01/24/2018    CALCIUM 8.9 01/24/2018    LABGLOM >60 01/24/2018    GFRAA >60 01/24/2018    GFR      01/24/2018    GFR NOT REPORTED 01/24/2018     HFP:    Lab Results   Component Value Date    PROT 6.2 01/23/2018     CMP:    Lab Results   Component Value Date    GLUCOSE 77 01/24/2018     01/24/2018    K 3.9 01/24/2018     01/24/2018    CO2 25 01/24/2018    BUN 9 01/24/2018    CREATININE 1.14 01/24/2018    ANIONGAP 10 01/24/2018    ALKPHOS 53 01/23/2018    ALT 7 01/23/2018    AST 13 01/23/2018    BILITOT 0.58 01/23/2018    LABALBU 3.5 01/23/2018    ALBUMIN 1.3 01/23/2018 endoscopy performed yesterday recommended.       2.  Several tiny hypodensities in the liver, which are too small to   adequately characterize but most likely benign.       3.  Emphysematous changes at the lung bases.  A couple of 4 mm nodules in the   lung bases are stable dating back to at least 06/29/2017. Consultations:    Consults:     Final Specialist Recommendations/Findings:   IP CONSULT TO INTERNAL MEDICINE  IP CONSULT TO HOSPITALIST  IP CONSULT TO CARDIOLOGY  IP CONSULT TO GI  IP CONSULT TO HOME CARE NEEDS      The patient was seen and examined on day of discharge and this discharge summary is in conjunction with any daily progress note from day of discharge. Discharge plan:     Disposition: Home with [address] [facility]    Physician Follow Up:     MD Rj Torrez [facility] [place] [address] [facility] [address]  [address] [facility]  [address] [place] [address] [phone]  In 1 month  For follow up. Have labs check prior to visit so aspirin can be readdressed.     [address] [facility] [address]  [address] [place] [address]  [phone]           Requiring Further Evaluation/Follow Up POST HOSPITALIZATION/Incidental Findings: Repeat CBC before following with PCP    Diet: cardiac diet    Activity: As tolerated    Instructions to Patient: Avoid smoking and alcohol    Discharge Medications:      Medication List      START taking these medications    acetaminophen 325 MG tablet  Commonly known as:  TYLENOL  Take 2 tablets by mouth every 4 hours as needed for Pain     famotidine 20 MG tablet  Commonly known as:  PEPCID  Take 1 tablet by mouth 2 times daily     ferrous sulfate 325 (65 Fe) MG EC tablet  Commonly known as:  FE TABS  Take 1 tablet by mouth 3 times daily        CONTINUE taking these medications    albuterol sulfate  (90 Base) MCG/ACT inhaler  Commonly known as:  PROAIR HFA  Inhale 2 puffs into the lungs every 6

## 2018-01-24 NOTE — PROGRESS NOTES
Smoking Cessation - topics covered   []  Health Risks  []  Benefits of Quitting   []  Smoking Cessation  []  Patient has no history of tobacco use  []  Patient is former smoker. []  No need for tobacco cessation education. []  Booklet given  [x]  Patient verbalizes understanding. [x]  Patient denies need for tobacco cessation education.   STEVEN WILSON  1:19 PM

## 2018-01-25 ENCOUNTER — CARE COORDINATION (OUTPATIENT)
Dept: CASE MANAGEMENT | Age: 63
End: 2018-01-25

## 2018-01-25 DIAGNOSIS — R07.2 PRECORDIAL PAIN: Primary | ICD-10-CM

## 2018-01-25 PROCEDURE — 1111F DSCHRG MED/CURRENT MED MERGE: CPT | Performed by: INTERNAL MEDICINE

## 2018-01-29 ENCOUNTER — HOSPITAL ENCOUNTER (OUTPATIENT)
Age: 63
Setting detail: SPECIMEN
Discharge: HOME | End: 2018-01-29
Payer: MEDICARE

## 2018-01-29 DIAGNOSIS — D64.9 ANEMIA, UNSPECIFIED TYPE: ICD-10-CM

## 2018-01-29 LAB
HCT VFR BLD CALC: 40.2 % (ref 40.7–50.3)
HEMOGLOBIN: 10.7 G/DL (ref 13–17)

## 2018-01-29 PROCEDURE — 85014 HEMATOCRIT: CPT

## 2018-01-29 PROCEDURE — 85018 HEMOGLOBIN: CPT

## 2018-01-29 PROCEDURE — 36415 COLL VENOUS BLD VENIPUNCTURE: CPT

## 2018-01-30 ENCOUNTER — OFFICE VISIT (OUTPATIENT)
Dept: INTERNAL MEDICINE | Age: 63
End: 2018-01-30
Payer: MEDICARE

## 2018-01-30 VITALS
OXYGEN SATURATION: 100 % | BODY MASS INDEX: 18.15 KG/M2 | WEIGHT: 134 LBS | HEIGHT: 72 IN | DIASTOLIC BLOOD PRESSURE: 86 MMHG | SYSTOLIC BLOOD PRESSURE: 138 MMHG | HEART RATE: 72 BPM

## 2018-01-30 DIAGNOSIS — Z23 NEED FOR PROPHYLACTIC VACCINATION AGAINST DIPHTHERIA-TETANUS-PERTUSSIS (DTP): ICD-10-CM

## 2018-01-30 DIAGNOSIS — D50.0 IRON DEFICIENCY ANEMIA DUE TO CHRONIC BLOOD LOSS: ICD-10-CM

## 2018-01-30 DIAGNOSIS — K92.2 GASTROINTESTINAL HEMORRHAGE, UNSPECIFIED GASTROINTESTINAL HEMORRHAGE TYPE: Primary | ICD-10-CM

## 2018-01-30 DIAGNOSIS — I25.10 CORONARY ARTERY DISEASE INVOLVING NATIVE CORONARY ARTERY OF NATIVE HEART WITHOUT ANGINA PECTORIS: ICD-10-CM

## 2018-01-30 DIAGNOSIS — J44.9 COPD, MODERATE (HCC): ICD-10-CM

## 2018-01-30 DIAGNOSIS — E78.00 PURE HYPERCHOLESTEROLEMIA: ICD-10-CM

## 2018-01-30 DIAGNOSIS — I10 ESSENTIAL HYPERTENSION: ICD-10-CM

## 2018-01-30 DIAGNOSIS — R73.03 PREDIABETES: ICD-10-CM

## 2018-01-30 LAB — HBA1C MFR BLD: 5 %

## 2018-01-30 PROCEDURE — 99496 TRANSJ CARE MGMT HIGH F2F 7D: CPT | Performed by: INTERNAL MEDICINE

## 2018-01-30 PROCEDURE — 99215 OFFICE O/P EST HI 40 MIN: CPT

## 2018-01-30 PROCEDURE — 83036 HEMOGLOBIN GLYCOSYLATED A1C: CPT | Performed by: INTERNAL MEDICINE

## 2018-01-30 RX ORDER — LANOLIN ALCOHOL/MO/W.PET/CERES
325 CREAM (GRAM) TOPICAL 3 TIMES DAILY
Qty: 90 TABLET | Refills: 0 | Status: SHIPPED | OUTPATIENT
Start: 2018-01-30 | End: 2018-03-28 | Stop reason: SDUPTHER

## 2018-01-30 RX ORDER — FAMOTIDINE 20 MG/1
20 TABLET, FILM COATED ORAL 2 TIMES DAILY
Qty: 60 TABLET | Refills: 3 | Status: SHIPPED | OUTPATIENT
Start: 2018-01-30 | End: 2018-09-13 | Stop reason: SDUPTHER

## 2018-01-30 RX ORDER — LOVASTATIN 40 MG/1
TABLET ORAL
Qty: 90 TABLET | Refills: 2 | Status: SHIPPED | OUTPATIENT
Start: 2018-01-30 | End: 2018-07-24 | Stop reason: SDUPTHER

## 2018-01-30 RX ORDER — FAMOTIDINE 20 MG/1
20 TABLET, FILM COATED ORAL 2 TIMES DAILY
Qty: 60 TABLET | Refills: 0 | Status: CANCELLED | OUTPATIENT
Start: 2018-01-30 | End: 2018-03-01

## 2018-01-30 RX ORDER — DOCUSATE SODIUM 100 MG/1
100 CAPSULE, LIQUID FILLED ORAL 2 TIMES DAILY
COMMUNITY
End: 2019-06-13 | Stop reason: SDUPTHER

## 2018-01-30 RX ORDER — BUDESONIDE AND FORMOTEROL FUMARATE DIHYDRATE 80; 4.5 UG/1; UG/1
2 AEROSOL RESPIRATORY (INHALATION) 2 TIMES DAILY
Qty: 2 INHALER | Refills: 5 | Status: SHIPPED | OUTPATIENT
Start: 2018-01-30 | End: 2018-07-24 | Stop reason: SDUPTHER

## 2018-01-30 RX ORDER — LISINOPRIL 2.5 MG/1
TABLET ORAL
Qty: 90 TABLET | Refills: 2 | Status: SHIPPED | OUTPATIENT
Start: 2018-01-30 | End: 2018-02-28 | Stop reason: SDUPTHER

## 2018-01-30 NOTE — PROGRESS NOTES
.  He has not had a video capsule or small bowel follow-through. Results for POC orders placed in visit on 01/30/18   POCT glycosylated hemoglobin (Hb A1C)   Result Value Ref Range    Hemoglobin A1C 5.0 %         CT abdomen/pelvis      Impression   1.  No acute process in the abdomen or pelvis.  No CT findings to account for   patient's reported anemia and positive hemoccult test.  Correlation with   endoscopy performed yesterday recommended.       2.  Several tiny hypodensities in the liver, which are too small to   adequately characterize but most likely benign.       3.  Emphysematous changes at the lung bases.  A couple of 4 mm nodules in the   lung bases are stable dating back to at least 06/29/2017.           Significant Diagnostic Studies:   CXR  No acute cardiopulmonary disease.  COPD.  Right apical bulla/blebs. EKG  Normal sinus rhythm  Possible Left atrial enlargement  Nonspecific ST abnormality  Abnormal ECG  When compared with ECG of 13-OCT-2015 04:58,  No significant change was found  Stress test   patient is intermediate risk based on Nuclear stress in 1/2017- revealed no Ischemic EF 43%     Colonoscopy  1. A 3mm sessile polyp was found in the rectosigmoid colon- removed with biopsy forceps  2. Non-bleeding small internal hemorrhoids were found on retroflexion  EGD  Esophagus: no gross lesions. Regular Z-line at 42cm. Stomach: Edematous nodular mucosa in gastric body- biopsies taken. No gross lesions in gastric antrum  Duodenum: no gross lesions noted in examined portion of duodenum. Op note 09/2017  PREOPERATIVE DIAGNOSIS: Fe def anemia.      PROCEDURES:   1) Transoral Upper Endoscopy, hemostasis with BiCap and placement of hemoclips.      POSTOPERATIVE DIAGNOSIS:   Gastric AVMs       Colonoscopy 09/2017  HISTORY: Mr. Hermila Hernandez is a 57 y.o. male who presents to the [place] [address] Endoscopy unit for colonoscopy. The patient's clinical history is remarkable for Fe def anemia.  He dependence     Primary adenocarcinoma of prostate ([facility] [address] 2009    tucker stage 6 s/p lap prostatectomy    Wears dentures     upper and lower    Wears glasses        Past Surgical History:   Procedure Laterality Date    CARDIAC CATHETERIZATION  2006    drug eluting stent to LCX    CARDIAC CATHETERIZATION  10/2015    patent stent    COLONOSCOPY  2008    int hemorrhoids, tiny sigmoid polyp    COLONOSCOPY  09/19/2017    Normal    COLONOSCOPY  1/23/2018    COLONOSCOPY WITH BIOPSY performed by Colton Andrews MD at [facility]    ENDOSCOPY, COLON, DIAGNOSTIC      INGUINAL HERNIA REPAIR Right     OR COLON CA SCRN NOT  [address]  IND N/A 9/19/2017    COLONOSCOPY performed by Bon Dowling MD at [address] [facility] ESOPHAGOGASTRODUODENOSCOPY TRANSORAL DIAGNOSTIC N/A 9/19/2017    EGD ESOPHAGOGASTRODUODENOSCOPY performed by Bon Dowling MD at [address] [place] [address]  2009    adenocarcinoma tucker stage 6    UPPER GASTROINTESTINAL ENDOSCOPY  2008    normal    UPPER GASTROINTESTINAL ENDOSCOPY  09/19/2017    The cardia, fundus, incisura, antrum and pylorus were identified via direct visualization. The endoscopic exam showed 6 small to medium AVMs. Hemostasis was achieved by BiCap cautery. Two hemoclips were placed.       UPPER GASTROINTESTINAL ENDOSCOPY  1/23/2018    EGD BIOPSY performed by Colton Andrews MD at [facility]         ALLERGIES      Allergies   Allergen Reactions    Phenytoin Sodium Extended Rash       MEDICATIONS:      Current Outpatient Prescriptions on File Prior to Visit   Medication Sig Dispense Refill    acetaminophen (TYLENOL) 325 MG tablet Take 2 tablets by mouth every 4 hours as needed for Pain 20 tablet 0    famotidine (PEPCID) 20 MG tablet Take 1 tablet by mouth 2 times daily 60 tablet 0    ferrous sulfate (FE TABS) 325 (65 Fe) MG EC tablet Take 1 tablet by mouth 3 times daily 90 tablet 0    SPIRIVA HANDIHALER 18 MCG inhalation capsule INHALE CONTENTS OF ONE CAPSULE INTO THE LUNGS 77 01/24/2018     HEMOGLOBIN A1C:   Lab Results   Component Value Date    LABA1C 5.9 03/15/2017     MICROALBUMIN URINE:   Lab Results   Component Value Date    MICROALBUR <12 07/26/2016     FASTING LIPID PANEL:  Lab Results   Component Value Date    CHOL 101 01/23/2018    HDL 31 (L) 01/23/2018    TRIG 79 01/23/2018     Lab Results   Component Value Date    LDLCHOLESTEROL 54 01/23/2018       LIVER PROFILE:  Lab Results   Component Value Date    ALT 7 01/23/2018    AST 13 01/23/2018    PROT 6.2 01/23/2018    BILITOT 0.58 01/23/2018    BILIDIR <0.08 07/26/2016    LABALBU 3.5 01/23/2018      THYROID FUNCTION:   Lab Results   Component Value Date    TSH 0.72 12/09/2013      URINE ANALYSIS: No results found for: LABURIN  ASSESSMENT AND PLAN:    1. Gastrointestinal hemorrhage, unspecified gastrointestinal hemorrhage type    - Mohit King MD, Hematology/Oncology Chaim Bauer MD, Gastroenterology [facility]*  - CBC With Auto Differential; Future    2. Iron deficiency anemia due to chronic blood loss    - Mohit King MD, Hematology/Oncology Chaim Bauer MD, Gastroenter[facility]*  - CBC With Auto Differential; Future    3. COPD, moderate    - budesonide-formoterol (SYMBICORT) 80-4.5 MCG/ACT AERO; Inhale 2 puffs into the lungs 2 times daily  Dispense: 2 Inhaler; Refill: 5  - tiotropium (SPIRIVA HANDIHALER) 18 MCG inhalation capsule; INHALE CONTENTS OF ONE CAPSULE INTO THE LUNGS EVERY DAY  Dispense: 30 capsule; Refill: 5    4. Coronary artery disease involving native coronary artery of native heart without angina pectoris  Asa on hold  Bb  Follow up with cardiology    - lovastatin (MEVACOR) 40 MG tablet; TAKE 1 TABLET BY MOUTH EVERY EVENING  Dispense: 90 tablet; Refill: 2  - lisinopril (PRINIVIL;ZESTRIL) 2.5 MG tablet; TAKE 1 TABLET BY MOUTH DAILY  Dispense: 90 tablet; Refill: 2    5.  Prediabetes    - POCT glycosylated hemoglobin (Hb A1C)  - Basic Metabolic

## 2018-01-30 NOTE — PATIENT INSTRUCTIONS
-Follow-up appointment scheduled for 2/28/18, AVS given to patient. It is very important that you keep your appointments, please contact us if you are unable to keep your scheduled appt     -Labs given to patient, they will have them done before their next visit.      -GI referral () placed the patient will contact their office to set up an appt    [facility]- Penelope Armenta MD  [address] [place] [address], [address] [facility], [facility]  [phone]    -Hematology () placed the patient will contact their office to set up an appt    [address] [facility] [address] [place] [address], Lance Majano [facility].  [phone]  ---Favio Langston

## 2018-01-31 ENCOUNTER — CARE COORDINATION (OUTPATIENT)
Dept: CASE MANAGEMENT | Age: 63
End: 2018-01-31

## 2018-02-01 ENCOUNTER — TELEPHONE (OUTPATIENT)
Dept: INFUSION THERAPY | Age: 63
End: 2018-02-01

## 2018-02-02 DIAGNOSIS — I10 ESSENTIAL HYPERTENSION: ICD-10-CM

## 2018-02-02 DIAGNOSIS — I42.9 CARDIOMYOPATHY (HCC): ICD-10-CM

## 2018-02-02 DIAGNOSIS — J44.9 COPD, MODERATE (HCC): ICD-10-CM

## 2018-02-02 DIAGNOSIS — I25.10 CORONARY ARTERY DISEASE INVOLVING NATIVE CORONARY ARTERY OF NATIVE HEART WITHOUT ANGINA PECTORIS: ICD-10-CM

## 2018-02-02 RX ORDER — LISINOPRIL 2.5 MG/1
TABLET ORAL
Qty: 90 TABLET | Refills: 0 | Status: SHIPPED | OUTPATIENT
Start: 2018-02-02 | End: 2018-04-09 | Stop reason: SDUPTHER

## 2018-02-02 RX ORDER — TIOTROPIUM BROMIDE 18 UG/1
CAPSULE ORAL; RESPIRATORY (INHALATION)
Qty: 30 CAPSULE | Refills: 5 | Status: SHIPPED | OUTPATIENT
Start: 2018-02-02 | End: 2018-07-24 | Stop reason: SDUPTHER

## 2018-02-02 NOTE — CARE COORDINATION
[facility] Transitions Follow Up Call    2018    Patient: Faith Diggs  Patient : 1955   MRN: [id number]  Reason for Admission: Chest Pain Discharge Date: 18 RARS: Risk Score: 13.75       Attempted to reach patient for subsequent transitional call. VM left to return call to [phone], [phone]. 2nd attempt. Care Transitions Subsequent and Final Call    Subsequent and Final Calls  Are you currently active with any services?:  Home Health  Care Transitions Interventions  Other Interventions:             Follow Up  Future Appointments  Date Time Provider Nando Chani   2018 2:00 PM Jennifer Polo MD [email or web address] exc [facility]   2018 12:20 PM Osmin Vital MD [facility]   2018 2:45 PM Jason Martinez MD [facility] MICHELLE Wesley RN

## 2018-02-04 ASSESSMENT — ENCOUNTER SYMPTOMS
SPUTUM PRODUCTION: 1
DIARRHEA: 0
EYE REDNESS: 0
CONSTIPATION: 1
BLOOD IN STOOL: 0
SINUS PAIN: 0
BLURRED VISION: 0
COUGH: 1
NAUSEA: 0
HEARTBURN: 0

## 2018-02-07 ENCOUNTER — CARE COORDINATION (OUTPATIENT)
Dept: CASE MANAGEMENT | Age: 63
End: 2018-02-07

## 2018-02-08 ENCOUNTER — OFFICE VISIT (OUTPATIENT)
Dept: GASTROENTEROLOGY | Age: 63
End: 2018-02-08
Payer: MEDICARE

## 2018-02-08 VITALS
BODY MASS INDEX: 18.44 KG/M2 | SYSTOLIC BLOOD PRESSURE: 139 MMHG | DIASTOLIC BLOOD PRESSURE: 88 MMHG | WEIGHT: 136 LBS | OXYGEN SATURATION: 100 % | HEART RATE: 90 BPM

## 2018-02-08 DIAGNOSIS — D64.9 ANEMIA, UNSPECIFIED TYPE: Primary | ICD-10-CM

## 2018-02-08 PROCEDURE — G8427 DOCREV CUR MEDS BY ELIG CLIN: HCPCS | Performed by: INTERNAL MEDICINE

## 2018-02-08 PROCEDURE — 4004F PT TOBACCO SCREEN RCVD TLK: CPT | Performed by: INTERNAL MEDICINE

## 2018-02-08 PROCEDURE — G8419 CALC BMI OUT NRM PARAM NOF/U: HCPCS | Performed by: INTERNAL MEDICINE

## 2018-02-08 PROCEDURE — G8598 ASA/ANTIPLAT THER USED: HCPCS | Performed by: INTERNAL MEDICINE

## 2018-02-08 PROCEDURE — 3017F COLORECTAL CA SCREEN DOC REV: CPT | Performed by: INTERNAL MEDICINE

## 2018-02-08 PROCEDURE — G8484 FLU IMMUNIZE NO ADMIN: HCPCS | Performed by: INTERNAL MEDICINE

## 2018-02-08 PROCEDURE — 99213 OFFICE O/P EST LOW 20 MIN: CPT | Performed by: INTERNAL MEDICINE

## 2018-02-08 PROCEDURE — 1111F DSCHRG MED/CURRENT MED MERGE: CPT | Performed by: INTERNAL MEDICINE

## 2018-02-08 ASSESSMENT — ENCOUNTER SYMPTOMS
BLOOD IN STOOL: 0
CHOKING: 0
NAUSEA: 0
SORE THROAT: 0
DIARRHEA: 0
SHORTNESS OF BREATH: 0
TROUBLE SWALLOWING: 0
ANAL BLEEDING: 0
RECTAL PAIN: 0
COUGH: 0
ABDOMINAL PAIN: 0
ABDOMINAL DISTENTION: 0
CONSTIPATION: 1
BACK PAIN: 1
VOMITING: 0

## 2018-02-08 NOTE — PROGRESS NOTES
DIGESTIVE HEALTH PROGRESS NOTE    HISTORY OF PRESENT ILLNESS: Mr. Yasmin Bryson is a 60 y.o. male who presents for follow up on anemia. Gastric AVMs s/p hemostasis. He is taking iron 3 times a day. He reports no gross bleeding. He feels better. H&H improved. He is still off aspirin and Plavix. He has cardiac stents. Past Medical, Family, and Social History reviewed and does contribute to the patient presenting condition. Patient's PMH/PSH,SH,PSYCH Hx, MEDs, ALLERGIES, and ROS were all reviewed and updated in the appropriate sections.     PAST MEDICAL HISTORY:  Past Medical History:   Diagnosis Date    Alcohol withdrawal seizure ([facility] [address] 1991    quit ETOH since 1991    Blood loss anemia 2008    transfusion from chronic plavix and asa use    CAD (coronary artery disease) 2006    angioplast with drug eluting stent to l circumflex     Cardiomyopathy ([facility] [address]     Chest pain     CKD (chronic kidney disease) 8/9/2016    COPD (chronic obstructive pulmonary disease) ([facility] [address] 08/2012    severe obstructive disease with bronchospasm on PFT    Cough     \"smoker\"    Erectile dysfunction     History of transfusion of packed red blood cells 2009    s/p prostate surgery    Hyperlipidemia     Hypertension     Nicotine dependence     Primary adenocarcinoma of prostate ([facility] [address] 2009    tucker stage 6 s/p lap prostatectomy    Wears dentures     upper and lower    Wears glasses        Past Surgical History:   Procedure Laterality Date    CARDIAC CATHETERIZATION  2006    drug eluting stent to LCX    CARDIAC CATHETERIZATION  10/2015    patent stent    COLONOSCOPY  2008    int hemorrhoids, tiny sigmoid polyp    COLONOSCOPY  09/19/2017    Normal    COLONOSCOPY  1/23/2018    COLONOSCOPY WITH BIOPSY performed by Soren Salter [facility] at [facility]    ENDOSCOPY, COLON, DIAGNOSTIC      INGUINAL HERNIA REPAIR Right     MS COLON CA [address]CRN NOT  [address]  IND N/A 9/19/2017    COLONOSCOPY performed by Alanis Chauhan MD at [address]  AL ESOPHAGOGASTRODUODENOSCOPY TRANSORAL DIAGNOSTIC N/A 9/19/2017    EGD ESOPHAGOGASTRODUODENOSCOPY performed by Shun Fowler MD at [address] [place] [address]  2009    adenocarcinoma tucker stage 6    UPPER GASTROINTESTINAL ENDOSCOPY  2008    normal    UPPER GASTROINTESTINAL ENDOSCOPY  09/19/2017    The cardia, fundus, incisura, antrum and pylorus were identified via direct visualization. The endoscopic exam showed 6 small to medium AVMs. Hemostasis was achieved by BiCap cautery. Two hemoclips were placed.       UPPER GASTROINTESTINAL ENDOSCOPY  1/23/2018    EGD BIOPSY performed by Anurag Chavarria MD at [facility]       CURRENT MEDICATIONS:    Current Outpatient Prescriptions:     lisinopril (PRINIVIL;ZESTRIL) 2.5 MG tablet, TAKE 1 TABLET BY MOUTH EVERY DAY, Disp: 90 tablet, Rfl: 0    SPIRIVA HANDIHALER 18 MCG inhalation capsule, INHALE CONTENTS OF 1 CAPSULE INTO THE LUNGS ONCE DAILY, Disp: 30 capsule, Rfl: 5    docusate sodium (COLACE) 100 MG capsule, Take 100 mg by mouth 2 times daily, Disp: , Rfl:     ferrous sulfate (FE TABS) 325 (65 Fe) MG EC tablet, Take 1 tablet by mouth 3 times daily, Disp: 90 tablet, Rfl: 0    lovastatin (MEVACOR) 40 MG tablet, TAKE 1 TABLET BY MOUTH EVERY EVENING, Disp: 90 tablet, Rfl: 2    lisinopril (PRINIVIL;ZESTRIL) 2.5 MG tablet, TAKE 1 TABLET BY MOUTH DAILY, Disp: 90 tablet, Rfl: 2    budesonide-formoterol (SYMBICORT) 80-4.5 MCG/ACT AERO, Inhale 2 puffs into the lungs 2 times daily, Disp: 2 Inhaler, Rfl: 5    tiotropium (SPIRIVA HANDIHALER) 18 MCG inhalation capsule, INHALE CONTENTS OF ONE CAPSULE INTO THE LUNGS EVERY DAY, Disp: 30 capsule, Rfl: 5    famotidine (PEPCID) 20 MG tablet, Take 1 tablet by mouth 2 times daily, Disp: 60 tablet, Rfl: 3    acetaminophen (TYLENOL) 325 MG tablet, Take 2 tablets by mouth every 4 hours as needed for Pain, Disp: 20 tablet, Rfl: 0    albuterol sulfate HFA (PROAIR HFA) 108 (90 Base) MCG/ACT inhaler, Inhale 2 puffs into the 01/24/2018    RDW 24.5 (H) 01/24/2018    MPV 9.2 01/24/2018    BASOPCT 1 09/13/2017    LYMPHSABS 1.60 09/13/2017    MONOSABS 0.53 09/13/2017    NEUTROABS 5.31 09/13/2017    EOSABS 0.08 09/13/2017    BASOSABS 0.08 09/13/2017         DIAGNOSTIC TESTING:   Xr Chest Standard (2 Vw)    Result Date: 1/22/2018  EXAMINATION: TWO VIEWS OF THE CHEST 1/22/2018 12:08 pm COMPARISON: Two-view chest from 08/05/2016 HISTORY: ORDERING SYSTEM PROVIDED HISTORY: chest pain TECHNOLOGIST PROVIDED HISTORY: Reason for exam:->chest pain Acuity: Unknown Type of Exam: Unknown Additional history of hypertension, cardiomyopathy, chronic obstructive pulmonary disease, chronic kidney disease, and anemia. FINDINGS: Overlying ECG monitor leads. Cardiac silhouette at the upper limits of normal.  Mediastinal structures midline and unchanged. Again noted are hyperinflated lungs with some cystic and linear densities right upper lobe, latter essentially unchanged. No new pulmonary finding or significant blunting of the costophrenic angles. No pneumothorax. Bones and soft tissues stable. No acute cardiopulmonary disease. COPD. Right apical bulla/blebs. Ct Abdomen Pelvis W Iv Contrast Additional Contrast? Oral    Result Date: 1/24/2018  EXAMINATION: CT OF THE ABDOMEN AND PELVIS WITH CONTRAST 1/24/2018 1:26 pm TECHNIQUE: CT of the abdomen and pelvis was performed with the administration of intravenous contrast. Multiplanar reformatted images are provided for review. Dose modulation, iterative reconstruction, and/or weight based adjustment of the mA/kV was utilized to reduce the radiation dose to as low as reasonably achievable. 75 mL intravenous Isovue 370 was used. Oral contrast was also administered. COMPARISON: None. CT chest dated 06/29/2017 reviewed.  HISTORY: ORDERING SYSTEM PROVIDED HISTORY: Unexplained anemia with occult blood positive stools, evaluate for malignancy TECHNOLOGIST PROVIDED HISTORY: Additional Contrast?->Oral FINDINGS: Lower Chest: Emphysematous changes are noted at the lung bases. There is no focal airspace consolidation or pleural effusion in the visualized lung bases. A couple of 4 mm nodules in the left lung base are unchanged from the previous CT chest dated 06/29/2017. Organs: There are several scattered subcentimeter hypodensities in the liver, which are too small to adequately characterize but most likely benign. A 1 cm cyst is noted at the hepatic dome. Gallbladder is within normal limits. The spleen, pancreas, and adrenal glands are within normal limits. There is symmetric enhancement of the kidneys. No hydronephrosis or perinephric inflammation. GI/Bowel: There is no abnormal bowel distention or pericolonic inflammation. No free intraperitoneal air or fluid. Appendix is normal.  No focal colonic wall thickening is identified. Pelvis: Urinary bladder is within normal limits. Prostate gland appears to be surgically absent. There is no pelvic lymphadenopathy. Peritoneum/Retroperitoneum: The abdominal aorta is normal in caliber. There is no retroperitoneal or mesenteric lymphadenopathy. Bones/Soft Tissues: There is no acute or suspicious osseous abnormality. Visualized superficial soft tissues are within normal limits. 1.  No acute process in the abdomen or pelvis. No CT findings to account for patient's reported anemia and positive hemoccult test.  Correlation with endoscopy performed yesterday recommended. 2.  Several tiny hypodensities in the liver, which are too small to adequately characterize but most likely benign. 3.  Emphysematous changes at the lung bases. A couple of 4 mm nodules in the lung bases are stable dating back to at least 06/29/2017. IMPRESSION: Mr. Neris Ortega is a 60 y.o. male with anemia. Gastric AVMs. Status post hemostasis. Continue iron supplements. Monitor H&H regularly. Follow-up in 3 months according to adjust the dose and frequency of iron supplements.   Okay to restart

## 2018-02-13 ENCOUNTER — HOSPITAL ENCOUNTER (OUTPATIENT)
Facility: MEDICAL CENTER | Age: 63
Discharge: HOME | End: 2018-02-13
Payer: MEDICARE

## 2018-02-13 ENCOUNTER — HOSPITAL ENCOUNTER (OUTPATIENT)
Age: 63
Discharge: HOME | End: 2018-02-13
Payer: MEDICARE

## 2018-02-13 ENCOUNTER — INITIAL CONSULT (OUTPATIENT)
Dept: ONCOLOGY | Age: 63
End: 2018-02-13
Payer: MEDICARE

## 2018-02-13 ENCOUNTER — TELEPHONE (OUTPATIENT)
Dept: ONCOLOGY | Age: 63
End: 2018-02-13

## 2018-02-13 VITALS
TEMPERATURE: 98.2 F | HEIGHT: 72 IN | DIASTOLIC BLOOD PRESSURE: 84 MMHG | WEIGHT: 132.4 LBS | SYSTOLIC BLOOD PRESSURE: 118 MMHG | RESPIRATION RATE: 18 BRPM | BODY MASS INDEX: 17.93 KG/M2 | HEART RATE: 89 BPM

## 2018-02-13 DIAGNOSIS — D50.0 IRON DEFICIENCY ANEMIA DUE TO CHRONIC BLOOD LOSS: Primary | ICD-10-CM

## 2018-02-13 DIAGNOSIS — I10 ESSENTIAL HYPERTENSION: ICD-10-CM

## 2018-02-13 DIAGNOSIS — D50.0 IRON DEFICIENCY ANEMIA DUE TO CHRONIC BLOOD LOSS: ICD-10-CM

## 2018-02-13 DIAGNOSIS — R73.03 PREDIABETES: ICD-10-CM

## 2018-02-13 LAB
ABSOLUTE EOS #: 0.33 K/UL (ref 0–0.4)
ABSOLUTE IMMATURE GRANULOCYTE: ABNORMAL K/UL (ref 0–0.3)
ABSOLUTE LYMPH #: 1.72 K/UL (ref 1–4.8)
ABSOLUTE MONO #: 0.66 K/UL (ref 0.2–0.8)
ANION GAP SERPL CALCULATED.3IONS-SCNC: 12 MMOL/L (ref 9–17)
BASOPHILS # BLD: 0 %
BASOPHILS ABSOLUTE: 0 K/UL (ref 0–0.2)
BUN BLDV-MCNC: 9 MG/DL (ref 8–23)
BUN/CREAT BLD: 8 (ref 9–20)
CALCIUM SERPL-MCNC: 9.2 MG/DL (ref 8.6–10.4)
CHLORIDE BLD-SCNC: 99 MMOL/L (ref 98–107)
CO2: 29 MMOL/L (ref 20–31)
CREAT SERPL-MCNC: 1.06 MG/DL (ref 0.7–1.2)
DIFFERENTIAL TYPE: ABNORMAL
EOSINOPHILS RELATIVE PERCENT: 4 % (ref 1–4)
FERRITIN: 113 UG/L (ref 30–400)
GFR AFRICAN AMERICAN: >60 ML/MIN
GFR NON-AFRICAN AMERICAN: >60 ML/MIN
GFR SERPL CREATININE-BSD FRML MDRD: ABNORMAL ML/MIN/{1.73_M2}
GFR SERPL CREATININE-BSD FRML MDRD: ABNORMAL ML/MIN/{1.73_M2}
GLUCOSE BLD-MCNC: 92 MG/DL (ref 70–99)
HCT VFR BLD CALC: 43.8 % (ref 41–53)
HEMOGLOBIN: 13.2 G/DL (ref 13.5–17.5)
IMMATURE GRANULOCYTES: ABNORMAL %
IRON SATURATION: 23 % (ref 20–55)
IRON: 79 UG/DL (ref 59–158)
LYMPHOCYTES # BLD: 21 % (ref 24–44)
MCH RBC QN AUTO: 24.1 PG (ref 26–34)
MCHC RBC AUTO-ENTMCNC: 30.2 G/DL (ref 31–37)
MCV RBC AUTO: 79.7 FL (ref 80–100)
MONOCYTES # BLD: 8 % (ref 1–7)
MORPHOLOGY: ABNORMAL
NRBC AUTOMATED: ABNORMAL PER 100 WBC
PDW BLD-RTO: 30.3 % (ref 11.5–14.5)
PLATELET # BLD: 420 K/UL (ref 130–400)
PLATELET ESTIMATE: ABNORMAL
PMV BLD AUTO: ABNORMAL FL (ref 6–12)
POTASSIUM SERPL-SCNC: 4.6 MMOL/L (ref 3.7–5.3)
RBC # BLD: 5.49 M/UL (ref 4.5–5.9)
RBC # BLD: ABNORMAL 10*6/UL
SEG NEUTROPHILS: 67 % (ref 36–66)
SEGMENTED NEUTROPHILS ABSOLUTE COUNT: 5.49 K/UL (ref 1.8–7.7)
SODIUM BLD-SCNC: 140 MMOL/L (ref 135–144)
TOTAL IRON BINDING CAPACITY: 343 UG/DL (ref 250–450)
UNSATURATED IRON BINDING CAPACITY: 264 UG/DL (ref 112–347)
WBC # BLD: 8.2 K/UL (ref 3.5–11)
WBC # BLD: ABNORMAL 10*3/UL

## 2018-02-13 PROCEDURE — 99201 HC NEW PT, E/M LEVEL 1: CPT

## 2018-02-13 PROCEDURE — 3017F COLORECTAL CA SCREEN DOC REV: CPT | Performed by: INTERNAL MEDICINE

## 2018-02-13 PROCEDURE — G8484 FLU IMMUNIZE NO ADMIN: HCPCS | Performed by: INTERNAL MEDICINE

## 2018-02-13 PROCEDURE — 85025 COMPLETE CBC W/AUTO DIFF WBC: CPT

## 2018-02-13 PROCEDURE — 83550 IRON BINDING TEST: CPT

## 2018-02-13 PROCEDURE — 83540 ASSAY OF IRON: CPT

## 2018-02-13 PROCEDURE — 82728 ASSAY OF FERRITIN: CPT

## 2018-02-13 PROCEDURE — G8419 CALC BMI OUT NRM PARAM NOF/U: HCPCS | Performed by: INTERNAL MEDICINE

## 2018-02-13 PROCEDURE — 80048 BASIC METABOLIC PNL TOTAL CA: CPT

## 2018-02-13 PROCEDURE — G8427 DOCREV CUR MEDS BY ELIG CLIN: HCPCS | Performed by: INTERNAL MEDICINE

## 2018-02-13 PROCEDURE — 36415 COLL VENOUS BLD VENIPUNCTURE: CPT

## 2018-02-13 PROCEDURE — 99204 OFFICE O/P NEW MOD 45 MIN: CPT | Performed by: INTERNAL MEDICINE

## 2018-02-13 NOTE — PROGRESS NOTES
PHYSICAL EXAM:  The patient is not in acute distress. Vital signs: Blood pressure 118/84, pulse 89, temperature 98.2 °F (36.8 °C), temperature source Oral, resp. rate 18, height 6' (1.829 m), weight 132 lb 6.4 oz (60.1 kg). HEENT:  Eyes are normal. Ears, nose and throat are normal.  Neck: Supple. No lymph node enlargement. No thyroid enlargement. Trachea is centrally located. Chest:  Clear to auscultation. No wheezes or crepitations. Heart: Regular sinus rhythm. Abdomen: Soft, nontender. No hepatosplenomegaly. No masses. Extremities:  With no edema. Lymph Nodes:  No cervical, axillary or inguinal lymph node enlargement. Neurologic:  Conscious and oriented. No focal neurological deficits. Psychosocial: No depression, anxiety or stress. Skin: No rashes, bruises or ecchymoses. Review of Diagnostic data:   Recent Labs      02/13/18   1355  01/29/18   1350  01/24/18   1216  01/24/18   0702   01/23/18   0608   WBC  8.2   --    --   8.4   --   6.6   HGB  13.2*  10.7*  9.6*  8.8*   < >  6.9*   HCT  43.8  40.2*   --   31.6*   --   25.7*   MCV  79.7*   --    --   73.5*   --   71.2*   PLT  420*   --    --   342   --   382    < > = values in this interval not displayed. Lab Results   Component Value Date    IRON 79 02/13/2018    TIBC 343 02/13/2018    FERRITIN 113 02/13/2018         IMPRESSION:   Iron deficiency anemia secondary to chronic GI blood loss  Status post GI bleeding  Gastric AV malformation    PLAN: I reviewed the labs available to me and discussed with the patient. For more than 60 minutes of face to face discussion, I explained to the patient the nature of this hematologic problem. I explained the significance of these abnormalities in layman language. Obviously patient had acute blood loss secondary to gastric AV malformation which was corrected. He has no signs of bleeding at the present time. No hematemesis or melena.   Repeated lab tests from today showed very good recovery with

## 2018-02-13 NOTE — LETTER
malformation which was corrected. He has no signs of bleeding at the present time. No hematemesis or melena. Repeated lab tests from today showed very good recovery with hemoglobin of 13. His iron level is normal.  I recommended continued oral iron for the next 3-4 months. No need for bone marrow testing or further hematologic workup at the present time. He will have continued monitoring since it is possible for him to have repeated episodes of bleeding from AV malformation. Patient understands and agrees. Patient's questions were answered to the best of his satisfaction and he verbalized full understanding and agreement. If you have questions, please do not hesitate to call me. I look forward to following Dilia Arnoldo along with you. Sincerely,    Ruth Harris MD  Cell: [phone]    This note is created with the assistance of a speech recognition program.  While intending to generate a document that actually reflects the content of the visit, the document can still have some errors including those of syntax and sound a like substitutions which may escape proof reading. It such instances, actual meaning can be extrapolated by contextual diversion.

## 2018-02-28 ENCOUNTER — OFFICE VISIT (OUTPATIENT)
Dept: INTERNAL MEDICINE | Age: 63
End: 2018-02-28
Payer: MEDICARE

## 2018-02-28 VITALS
HEART RATE: 104 BPM | BODY MASS INDEX: 17.88 KG/M2 | WEIGHT: 132 LBS | DIASTOLIC BLOOD PRESSURE: 56 MMHG | SYSTOLIC BLOOD PRESSURE: 92 MMHG | HEIGHT: 72 IN

## 2018-02-28 DIAGNOSIS — D50.0 IRON DEFICIENCY ANEMIA DUE TO CHRONIC BLOOD LOSS: ICD-10-CM

## 2018-02-28 DIAGNOSIS — I25.119 CORONARY ARTERY DISEASE INVOLVING NATIVE CORONARY ARTERY OF NATIVE HEART WITH ANGINA PECTORIS (HCC): ICD-10-CM

## 2018-02-28 DIAGNOSIS — R73.03 PREDIABETES: ICD-10-CM

## 2018-02-28 DIAGNOSIS — I10 ESSENTIAL HYPERTENSION: ICD-10-CM

## 2018-02-28 DIAGNOSIS — J44.9 COPD, MODERATE (HCC): Primary | ICD-10-CM

## 2018-02-28 DIAGNOSIS — N18.30 STAGE 3 CHRONIC KIDNEY DISEASE (HCC): ICD-10-CM

## 2018-02-28 DIAGNOSIS — I42.9 CARDIOMYOPATHY, UNSPECIFIED TYPE (HCC): ICD-10-CM

## 2018-02-28 PROCEDURE — 99214 OFFICE O/P EST MOD 30 MIN: CPT | Performed by: INTERNAL MEDICINE

## 2018-02-28 PROCEDURE — 4004F PT TOBACCO SCREEN RCVD TLK: CPT | Performed by: INTERNAL MEDICINE

## 2018-02-28 PROCEDURE — G8419 CALC BMI OUT NRM PARAM NOF/U: HCPCS | Performed by: INTERNAL MEDICINE

## 2018-02-28 PROCEDURE — 3023F SPIROM DOC REV: CPT | Performed by: INTERNAL MEDICINE

## 2018-02-28 PROCEDURE — G8484 FLU IMMUNIZE NO ADMIN: HCPCS | Performed by: INTERNAL MEDICINE

## 2018-02-28 PROCEDURE — 3017F COLORECTAL CA SCREEN DOC REV: CPT | Performed by: INTERNAL MEDICINE

## 2018-02-28 PROCEDURE — G8427 DOCREV CUR MEDS BY ELIG CLIN: HCPCS | Performed by: INTERNAL MEDICINE

## 2018-02-28 PROCEDURE — G8598 ASA/ANTIPLAT THER USED: HCPCS | Performed by: INTERNAL MEDICINE

## 2018-02-28 PROCEDURE — G8926 SPIRO NO PERF OR DOC: HCPCS | Performed by: INTERNAL MEDICINE

## 2018-02-28 PROCEDURE — 99213 OFFICE O/P EST LOW 20 MIN: CPT

## 2018-02-28 ASSESSMENT — ENCOUNTER SYMPTOMS
HEARTBURN: 0
DIARRHEA: 0
CONSTIPATION: 1
BLURRED VISION: 0
NAUSEA: 0
SINUS PAIN: 0
EYE REDNESS: 0
SPUTUM PRODUCTION: 1
COUGH: 1
BLOOD IN STOOL: 0

## 2018-02-28 NOTE — PROGRESS NOTES
[facility]    Progress Note    Date of patient's visit: 2/28/2018    Patient's Name:  Zaid Kuhn    YOB: 1955            Patient Care Team:  Wyatt Summers MD as PCP - General (Internal Medicine)  Wyatt Summers MD as PCP - S Attributed Provider  Ceasar Frausto CNP as Nurse Practitioner (Cardiology)  Haris Devine RN as   Susan Meek MD as Consulting Physician (Gastroenterology)    REASON FOR VISIT: Routine outpatient follow     Chief Complaint   Patient presents with    Diabetes    Hypertension    Discuss Labs     Pt had labs done on 2/13/18     Discuss Medications     pt thinks that he should start back on plavix due to him having a stent    Health Maintenance     labs pended, shingles vaccine pended          HISTORY OF PRESENT ILLNESS:    History was obtained from the patient. Zaid Kuhn is a 60 y.o. is here for Follow-up. He has gastric AVM's. He has followed up with GI and Hematology. He is on iron supplements. He was told to stop Asa and Plavix but says he has been taking asa 325 mg daily. Currently denies any blood in his stools. Fatigue is better. He denies dizziness. His recent hemoglobin is much improved. He still has microcytic anemia but much better. He is taking iron and takes Colace for stool softening. His blood pressure is low today. He says his been taking his medications the same. I have advised him to decrease his metoprolol to 12.5 mg 2 times a day instead of the whole tablet. He was hospitalized in January for symptomatic and deficiency anemia. His hemoglobin was 6.9 on admission. His stool Hemoccult was positive. He underwent another GI workup with EGD and colonoscopy and a CAT scan of his abdomen. He had a previous EGD and colonoscopy in September of last year which showed small AVM in on EGD in the stomach but otherwise nothing significant.   Currently he is denying blood in drug eluting stent to l circumflex     Cardiomyopathy ([facility] [address]     Chest pain     CKD (chronic kidney disease) 8/9/2016    COPD (chronic obstructive pulmonary disease) ([facility] [address] 08/2012    severe obstructive disease with bronchospasm on PFT    Cough     \"smoker\"    Erectile dysfunction     History of transfusion of packed red blood cells 2009    s/p prostate surgery    Hyperlipidemia     Hypertension     Nicotine dependence     Primary adenocarcinoma of prostate ([facility] [address] 2009    tucker stage 6 s/p lap prostatectomy    Wears dentures     upper and lower    Wears glasses        Past Surgical History:   Procedure Laterality Date    CARDIAC CATHETERIZATION  2006    drug eluting stent to [address]  10/2015    patent stent    COLONOSCOPY  2008    int hemorrhoids, tiny sigmoid polyp    COLONOSCOPY  09/19/2017    Normal    COLONOSCOPY  1/23/2018    COLONOSCOPY WITH BIOPSY performed by Crow Montana MD at [facility]    ENDOSCOPY, COLON, DIAGNOSTIC      INGUINAL HERNIA REPAIR Right     MT COLON CA SCRN NOT  [address]  IND N/A 9/19/2017    COLONOSCOPY performed by Keila Garcia MD at [facility] ESOPHAGOGASTRODUODENOSCOPY TRANSORAL DIAGNOSTIC N/A 9/19/2017    EGD ESOPHAGOGASTRODUODENOSCOPY performed by Keila Garcia MD at [facility]  2009    adenocarcinoma tucker stage 6    UPPER GASTROINTESTINAL ENDOSCOPY  2008    normal    UPPER GASTROINTESTINAL ENDOSCOPY  09/19/2017    The cardia, fundus, incisura, antrum and pylorus were identified via direct visualization. The endoscopic exam showed 6 small to medium AVMs. Hemostasis was achieved by BiCap cautery. Two hemoclips were placed.       UPPER GASTROINTESTINAL ENDOSCOPY  1/23/2018    EGD BIOPSY performed by Crow Montana MD at [facility]         ALLERGIES      Allergies   Allergen Reactions    Phenytoin Sodium Extended Rash       MEDICATIONS:      Current Outpatient Prescriptions on File Prior to Visit Medication Sig Dispense Refill    lisinopril (PRINIVIL;ZESTRIL) 2.5 MG tablet TAKE 1 TABLET BY MOUTH EVERY DAY 90 tablet 0    SPIRIVA HANDIHALER 18 MCG inhalation capsule INHALE CONTENTS OF 1 CAPSULE INTO THE LUNGS ONCE DAILY 30 capsule 5    docusate sodium (COLACE) 100 MG capsule Take 100 mg by mouth 2 times daily      ferrous sulfate (FE TABS) 325 (65 Fe) MG EC tablet Take 1 tablet by mouth 3 times daily 90 tablet 0    lovastatin (MEVACOR) 40 MG tablet TAKE 1 TABLET BY MOUTH EVERY EVENING 90 tablet 2    budesonide-formoterol (SYMBICORT) 80-4.5 MCG/ACT AERO Inhale 2 puffs into the lungs 2 times daily 2 Inhaler 5    famotidine (PEPCID) 20 MG tablet Take 1 tablet by mouth 2 times daily 60 tablet 3    acetaminophen (TYLENOL) 325 MG tablet Take 2 tablets by mouth every 4 hours as needed for Pain 20 tablet 0    albuterol sulfate HFA (PROAIR HFA) 108 (90 Base) MCG/ACT inhaler Inhale 2 puffs into the lungs every 6 hours as needed for Wheezing 1 Inhaler 3    metoprolol tartrate (LOPRESSOR) 25 MG tablet TAKE 1 TABLET BY MOUTH TWICE DAILY 180 tablet 5    isosorbide mononitrate (IMDUR) 30 MG extended release tablet Take 1 tablet by mouth daily 30 tablet 11    nitroGLYCERIN (NITROSTAT) 0.3 MG SL tablet DISSOLVE 1 TABLET UNDER THE TONGUE EVERY 5 MINUTES AS NEEDED FOR CHEST PAIN UNTIL RELIEF IS OBTAINED. IF PAIN PERSISTS CALL 911 300 tablet 3     No current facility-administered medications on file prior to visit. SOCIAL HISTORY    Reviewed and no change from previous record. Guerrero Silvestre  reports that he has been smoking Cigarettes. He has a 25.00 pack-year smoking history.  He has never used smokeless tobacco.    FAMILY HISTORY:    Reviewed and No change from previous visit    HEALTH MAINTENANCE DUE:      Health Maintenance Due   Topic Date Due    Hepatitis C screen  1955    HIV screen  10/12/1969       REVIEW OF SYSTEMS:    12 point review of symptoms completed and found to be normal except noted in the HPI    Review of Systems   Constitutional: Positive for malaise/fatigue. Negative for chills, fever and weight loss. HENT: Positive for congestion. Negative for hearing loss and sinus pain. Eyes: Negative for blurred vision and redness. Respiratory: Positive for cough and sputum production. Cardiovascular: Negative for chest pain, palpitations and leg swelling. Gastrointestinal: Positive for constipation. Negative for blood in stool, diarrhea, heartburn, melena and nausea. Genitourinary: Negative for dysuria and frequency. Neurological: Negative for dizziness, loss of consciousness and headaches. Endo/Heme/Allergies: Negative for polydipsia. Does not bruise/bleed easily. Psychiatric/Behavioral: Negative for depression. The patient is not nervous/anxious. PHYSICAL EXAM:      Vitals:    02/28/18 1526 02/28/18 1536   BP: 92/64 (!) 83/59   Site: Left Arm Right Arm   Position: Sitting Standing   Cuff Size: Small Adult Small Adult   Pulse: 104    Weight: 132 lb (59.9 kg)    Height: 6' (1.829 m)      Body mass index is 17.9 kg/m². BP Readings from Last 3 Encounters:   02/28/18 (!) 83/59   02/13/18 118/84   02/08/18 139/88        Wt Readings from Last 3 Encounters:   02/28/18 132 lb (59.9 kg)   02/13/18 132 lb 6.4 oz (60.1 kg)   02/08/18 136 lb (61.7 kg)       Physical Exam      HENT:  Normocephalic, Atraumatic, Bilateral external ears normal, Oropharynx moist,  Nose congested Neck- Normal range of motion, No tenderness, Supple, No goitre  Eyes:  PERRL, EOMI, Conjunctiva normal, No icterus  Respiratory:  distant breath sounds, No respiratory distress, No wheezing, No chest tenderness. Cardiovascular:  Normal heart rate, Normal rhythm, No murmurs,   GI:  Bowel sounds normal, Soft, No tenderness, No masses, No CVA tenderness. Musculoskeletal:  Intact distal pulses, No edema, No tenderness,  No tenderness to palpation or major deformities noted. Back- No tenderness.    Integument:  Warm, Dry, cessation    2. Reviewed prior labs and health maintenance. 3. Discussed use, benefit, and side effects of prescribed medications. Barriers to medication compliance addressed. All patient questions answered. Pt voiced understanding.        Jake Huang  Attending Physician, [address] [place] [address], Internal Medicine Residency Program  [address] [place] [address]  2/28/2018, 3:46 PM

## 2018-03-02 NOTE — TELEPHONE ENCOUNTER
patric request for NITROSTAT future appointment scheduled. Health Maintenance   Topic Date Due    Hepatitis C screen  1955    HIV screen  10/12/1969    DTaP/Tdap/Td vaccine (1 - Tdap) 04/30/2018 (Originally 10/12/1973)    Shingles Vaccine (1 of 2 - 2 Dose Series) 05/28/2018 (Originally 10/12/2004)    A1C test (Diabetic or Prediabetic)  01/30/2019    Potassium monitoring  02/13/2019    Creatinine monitoring  02/13/2019    Lipid screen  01/23/2023    Colon cancer screen colonoscopy  01/23/2028    Flu vaccine  Completed    Pneumococcal med risk  Completed             (applicable per patient's age: Cancer Screenings, Depression Screening, Fall Risk Screening, Immunizations)    Hemoglobin A1C (%)   Date Value   01/30/2018 5.0   03/15/2017 5.9   07/26/2016 5.9     Microalb/Crt.  Ratio (mcg/mg creat)   Date Value   07/26/2016 4     LDL Cholesterol (mg/dL)   Date Value   01/23/2018 54     AST (U/L)   Date Value   01/23/2018 13     ALT (U/L)   Date Value   01/23/2018 7     BUN (mg/dL)   Date Value   02/13/2018 9      (goal A1C is < 7)   (goal LDL is <100) need 30-50% reduction from baseline     BP Readings from Last 3 Encounters:   02/28/18 (!) 92/56   02/13/18 118/84   02/08/18 139/88    (goal /80)      All Future Testing planned in [facility]:  Lab Frequency Next Occurrence   Ferritin Once 11/08/2017   IRON PROFILE Once 11/08/2017   Vitamin B12 & Folate Once 09/01/2017   CBC With Auto Differential Once 04/07/2018   Transfuse RBC     CBC Auto Differential     Ferritin     Iron and TIBC         Next Visit Date:  Future Appointments  Date Time Provider Nando Stallings   4[facility]2018 3:00 PM [facility]   4/13/2018 11:00 AM SCHEDULE, [facility]   4/17/2018 2:40 PM [facility]   5/10/2018 1:00 PM Jinny Gross MD [facility]            Patient Active Problem List:     COPD, moderate     HTN (hypertension)

## 2018-03-05 RX ORDER — NITROGLYCERIN 0.3 MG/1
TABLET SUBLINGUAL
Qty: 100 TABLET | Refills: 0 | Status: SHIPPED | OUTPATIENT
Start: 2018-03-05 | End: 2018-06-15 | Stop reason: SDUPTHER

## 2018-03-28 RX ORDER — LANOLIN ALCOHOL/MO/W.PET/CERES
CREAM (GRAM) TOPICAL
Qty: 90 TABLET | Refills: 0 | Status: SHIPPED | OUTPATIENT
Start: 2018-03-28 | End: 2018-10-24 | Stop reason: ALTCHOICE

## 2018-03-28 NOTE — TELEPHONE ENCOUNTER
Refill on Ferrous Sulfate. Last seen on 2/28/18. Medication was D/C on 3/1/18, pending. Next Visit Date:  Future Appointments  Date Time Provider Nando Stallings   4/9/2018 3:00 PM Barbie Stein MD [facility]   4/13/2018 11:00 AM SCHEDULE, [facility]   4/17/2018 2:40 PM Danica Sahu MD [facility]   5/10/2018 1:00 PM Geoff Mccarthy MD [facility] [id number] Maintenance   Topic Date Due    Hepatitis C screen  1955    HIV screen  10/12/1969    DTaP/Tdap/Td vaccine (1 - Tdap) 04/30/2018 (Originally 10/12/1973)    Shingles Vaccine (1 of 2 - 2 Dose Series) 05/28/2018 (Originally 10/12/2004)    A1C test (Diabetic or Prediabetic)  01/30/2019    Potassium monitoring  02/13/2019    Creatinine monitoring  02/13/2019    Lipid screen  01/23/2023    Colon cancer screen colonoscopy  01/23/2028    Flu vaccine  Completed    Pneumococcal med risk  Completed       Hemoglobin A1C (%)   Date Value   01/30/2018 5.0   03/15/2017 5.9   07/26/2016 5.9             ( goal A1C is < 7)   Microalb/Crt.  Ratio (mcg/mg creat)   Date Value   07/26/2016 4     LDL Cholesterol (mg/dL)   Date Value   01/23/2018 54       (goal LDL is <100)   AST (U/L)   Date Value   01/23/2018 13     ALT (U/L)   Date Value   01/23/2018 7     BUN (mg/dL)   Date Value   02/13/2018 9     BP Readings from Last 3 Encounters:   02/28/18 (!) 92/56   02/13/18 118/84   02/08/18 139/88          (goal 120/80)    All Future Testing planned in [facility]  Lab Frequency Next Occurrence   Ferritin Once 11/08/2017   IRON PROFILE Once 11/08/2017   Vitamin B12 & Folate Once 09/01/2017   CBC With Auto Differential Once 04/07/2018   Transfuse RBC     CBC Auto Differential     Ferritin     Iron and TIBC                 Patient Active Problem List:     COPD, moderate     HTN (hypertension)     Hyperlipemia     Smoker     mild Cardiomyopathy-ischemic ejection fraction 40-45%      CAD (coronary artery disease)--with

## 2018-04-09 ENCOUNTER — OFFICE VISIT (OUTPATIENT)
Dept: INTERNAL MEDICINE | Age: 63
End: 2018-04-09
Payer: MEDICARE

## 2018-04-09 ENCOUNTER — HOSPITAL ENCOUNTER (OUTPATIENT)
Age: 63
Setting detail: SPECIMEN
Discharge: HOME | End: 2018-04-09
Payer: MEDICARE

## 2018-04-09 VITALS
DIASTOLIC BLOOD PRESSURE: 86 MMHG | BODY MASS INDEX: 18.96 KG/M2 | OXYGEN SATURATION: 99 % | SYSTOLIC BLOOD PRESSURE: 130 MMHG | WEIGHT: 140 LBS | HEART RATE: 80 BPM | HEIGHT: 72 IN

## 2018-04-09 DIAGNOSIS — E78.00 PURE HYPERCHOLESTEROLEMIA: ICD-10-CM

## 2018-04-09 DIAGNOSIS — J44.9 MODERATE COPD (CHRONIC OBSTRUCTIVE PULMONARY DISEASE) (HCC): Primary | ICD-10-CM

## 2018-04-09 DIAGNOSIS — D50.9 MICROCYTIC ANEMIA: ICD-10-CM

## 2018-04-09 DIAGNOSIS — Z11.59 ENCOUNTER FOR HEPATITIS C SCREENING TEST FOR LOW RISK PATIENT: ICD-10-CM

## 2018-04-09 DIAGNOSIS — K31.819 GASTRIC AVM: ICD-10-CM

## 2018-04-09 DIAGNOSIS — F17.200 SMOKER: ICD-10-CM

## 2018-04-09 DIAGNOSIS — I10 ESSENTIAL HYPERTENSION: ICD-10-CM

## 2018-04-09 DIAGNOSIS — R73.03 PREDIABETES: ICD-10-CM

## 2018-04-09 DIAGNOSIS — I25.10 CORONARY ARTERY DISEASE INVOLVING NATIVE CORONARY ARTERY OF NATIVE HEART WITHOUT ANGINA PECTORIS: ICD-10-CM

## 2018-04-09 LAB
ABSOLUTE EOS #: 0.28 K/UL (ref 0–0.44)
ABSOLUTE IMMATURE GRANULOCYTE: 0.03 K/UL (ref 0–0.3)
ABSOLUTE LYMPH #: 1.71 K/UL (ref 1.1–3.7)
ABSOLUTE MONO #: 0.54 K/UL (ref 0.1–1.2)
BASOPHILS # BLD: 1 % (ref 0–2)
BASOPHILS ABSOLUTE: 0.05 K/UL (ref 0–0.2)
DIFFERENTIAL TYPE: ABNORMAL
EOSINOPHILS RELATIVE PERCENT: 5 % (ref 1–4)
HCT VFR BLD CALC: 45.9 % (ref 40.7–50.3)
HEMOGLOBIN: 13.7 G/DL (ref 13–17)
IMMATURE GRANULOCYTES: 1 %
LYMPHOCYTES # BLD: 28 % (ref 24–43)
MCH RBC QN AUTO: 26.7 PG (ref 25.2–33.5)
MCHC RBC AUTO-ENTMCNC: 29.8 G/DL (ref 28.4–34.8)
MCV RBC AUTO: 89.5 FL (ref 82.6–102.9)
MONOCYTES # BLD: 9 % (ref 3–12)
NRBC AUTOMATED: 0 PER 100 WBC
PDW BLD-RTO: 18 % (ref 11.8–14.4)
PLATELET # BLD: 250 K/UL (ref 138–453)
PLATELET ESTIMATE: ABNORMAL
PMV BLD AUTO: 9.8 FL (ref 8.1–13.5)
RBC # BLD: 5.13 M/UL (ref 4.21–5.77)
RBC # BLD: ABNORMAL 10*6/UL
SEG NEUTROPHILS: 56 % (ref 36–65)
SEGMENTED NEUTROPHILS ABSOLUTE COUNT: 3.46 K/UL (ref 1.5–8.1)
WBC # BLD: 6.1 K/UL (ref 3.5–11.3)
WBC # BLD: ABNORMAL 10*3/UL

## 2018-04-09 PROCEDURE — 85025 COMPLETE CBC W/AUTO DIFF WBC: CPT

## 2018-04-09 PROCEDURE — G8926 SPIRO NO PERF OR DOC: HCPCS | Performed by: INTERNAL MEDICINE

## 2018-04-09 PROCEDURE — 99214 OFFICE O/P EST MOD 30 MIN: CPT | Performed by: INTERNAL MEDICINE

## 2018-04-09 PROCEDURE — 4004F PT TOBACCO SCREEN RCVD TLK: CPT | Performed by: INTERNAL MEDICINE

## 2018-04-09 PROCEDURE — G8427 DOCREV CUR MEDS BY ELIG CLIN: HCPCS | Performed by: INTERNAL MEDICINE

## 2018-04-09 PROCEDURE — 3017F COLORECTAL CA SCREEN DOC REV: CPT | Performed by: INTERNAL MEDICINE

## 2018-04-09 PROCEDURE — G8420 CALC BMI NORM PARAMETERS: HCPCS | Performed by: INTERNAL MEDICINE

## 2018-04-09 PROCEDURE — G8598 ASA/ANTIPLAT THER USED: HCPCS | Performed by: INTERNAL MEDICINE

## 2018-04-09 PROCEDURE — 99213 OFFICE O/P EST LOW 20 MIN: CPT

## 2018-04-09 PROCEDURE — 3023F SPIROM DOC REV: CPT | Performed by: INTERNAL MEDICINE

## 2018-04-09 RX ORDER — LISINOPRIL 2.5 MG/1
TABLET ORAL
Qty: 90 TABLET | Refills: 3 | Status: SHIPPED | OUTPATIENT
Start: 2018-04-09 | End: 2018-12-10 | Stop reason: ALTCHOICE

## 2018-04-09 ASSESSMENT — PATIENT HEALTH QUESTIONNAIRE - PHQ9
SUM OF ALL RESPONSES TO PHQ9 QUESTIONS 1 & 2: 0
1. LITTLE INTEREST OR PLEASURE IN DOING THINGS: 0
SUM OF ALL RESPONSES TO PHQ QUESTIONS 1-9: 0
2. FEELING DOWN, DEPRESSED OR HOPELESS: 0

## 2018-04-13 ENCOUNTER — HOSPITAL ENCOUNTER (OUTPATIENT)
Facility: MEDICAL CENTER | Age: 63
Discharge: HOME | End: 2018-04-13
Payer: MEDICARE

## 2018-04-13 ENCOUNTER — TELEPHONE (OUTPATIENT)
Dept: INTERNAL MEDICINE | Age: 63
End: 2018-04-13

## 2018-04-13 DIAGNOSIS — R07.9 CHEST PAIN, UNSPECIFIED TYPE: ICD-10-CM

## 2018-04-13 DIAGNOSIS — D50.0 IRON DEFICIENCY ANEMIA DUE TO CHRONIC BLOOD LOSS: ICD-10-CM

## 2018-04-13 LAB
ABSOLUTE EOS #: 0.3 K/UL (ref 0–0.4)
ABSOLUTE IMMATURE GRANULOCYTE: ABNORMAL K/UL (ref 0–0.3)
ABSOLUTE LYMPH #: 1.5 K/UL (ref 1–4.8)
ABSOLUTE MONO #: 0.6 K/UL (ref 0.2–0.8)
BASOPHILS # BLD: 1 % (ref 0–2)
BASOPHILS ABSOLUTE: 0 K/UL (ref 0–0.2)
DIFFERENTIAL TYPE: ABNORMAL
EOSINOPHILS RELATIVE PERCENT: 4 % (ref 1–4)
FERRITIN: 60 UG/L (ref 30–400)
HCT VFR BLD CALC: 45.7 % (ref 41–53)
HEMOGLOBIN: 14.4 G/DL (ref 13.5–17.5)
IMMATURE GRANULOCYTES: ABNORMAL %
IRON SATURATION: 52 % (ref 20–55)
IRON: 140 UG/DL (ref 59–158)
LYMPHOCYTES # BLD: 24 % (ref 24–44)
MCH RBC QN AUTO: 27.9 PG (ref 26–34)
MCHC RBC AUTO-ENTMCNC: 31.5 G/DL (ref 31–37)
MCV RBC AUTO: 88.6 FL (ref 80–100)
MONOCYTES # BLD: 9 % (ref 1–7)
NRBC AUTOMATED: ABNORMAL PER 100 WBC
PDW BLD-RTO: 14.8 % (ref 11.5–14.5)
PLATELET # BLD: 273 K/UL (ref 130–400)
PLATELET ESTIMATE: ABNORMAL
PMV BLD AUTO: 7.3 FL (ref 6–12)
RBC # BLD: 5.16 M/UL (ref 4.5–5.9)
RBC # BLD: ABNORMAL 10*6/UL
SEG NEUTROPHILS: 62 % (ref 36–66)
SEGMENTED NEUTROPHILS ABSOLUTE COUNT: 3.9 K/UL (ref 1.8–7.7)
TOTAL IRON BINDING CAPACITY: 269 UG/DL (ref 250–450)
UNSATURATED IRON BINDING CAPACITY: 129 UG/DL (ref 112–347)
WBC # BLD: 6.2 K/UL (ref 3.5–11)
WBC # BLD: ABNORMAL 10*3/UL

## 2018-04-13 PROCEDURE — 36415 COLL VENOUS BLD VENIPUNCTURE: CPT

## 2018-04-13 PROCEDURE — 83540 ASSAY OF IRON: CPT

## 2018-04-13 PROCEDURE — 83550 IRON BINDING TEST: CPT

## 2018-04-13 PROCEDURE — 85025 COMPLETE CBC W/AUTO DIFF WBC: CPT

## 2018-04-13 PROCEDURE — 82728 ASSAY OF FERRITIN: CPT

## 2018-04-13 RX ORDER — ISOSORBIDE MONONITRATE 30 MG/1
30 TABLET, EXTENDED RELEASE ORAL DAILY
Qty: 30 TABLET | Refills: 11 | Status: SHIPPED | OUTPATIENT
Start: 2018-04-13 | End: 2019-04-19 | Stop reason: SDUPTHER

## 2018-04-13 RX ORDER — FAMOTIDINE 20 MG/1
20 TABLET, FILM COATED ORAL 2 TIMES DAILY
COMMUNITY
End: 2018-10-25 | Stop reason: SDUPTHER

## 2018-04-16 ENCOUNTER — HOSPITAL ENCOUNTER (OUTPATIENT)
Facility: MEDICAL CENTER | Age: 63
End: 2018-04-16
Payer: MEDICARE

## 2018-04-17 ENCOUNTER — OFFICE VISIT (OUTPATIENT)
Dept: ONCOLOGY | Age: 63
End: 2018-04-17
Payer: MEDICARE

## 2018-04-17 ENCOUNTER — TELEPHONE (OUTPATIENT)
Dept: ONCOLOGY | Age: 63
End: 2018-04-17

## 2018-04-17 VITALS
TEMPERATURE: 97.3 F | HEART RATE: 81 BPM | SYSTOLIC BLOOD PRESSURE: 126 MMHG | BODY MASS INDEX: 19.34 KG/M2 | WEIGHT: 142.6 LBS | DIASTOLIC BLOOD PRESSURE: 82 MMHG | RESPIRATION RATE: 20 BRPM

## 2018-04-17 DIAGNOSIS — C61 PROSTATE CANCER (HCC): ICD-10-CM

## 2018-04-17 DIAGNOSIS — D50.0 IRON DEFICIENCY ANEMIA DUE TO CHRONIC BLOOD LOSS: Primary | ICD-10-CM

## 2018-04-17 DIAGNOSIS — N18.30 STAGE 3 CHRONIC KIDNEY DISEASE (HCC): ICD-10-CM

## 2018-04-17 PROCEDURE — 4004F PT TOBACCO SCREEN RCVD TLK: CPT | Performed by: INTERNAL MEDICINE

## 2018-04-17 PROCEDURE — G8598 ASA/ANTIPLAT THER USED: HCPCS | Performed by: INTERNAL MEDICINE

## 2018-04-17 PROCEDURE — G8420 CALC BMI NORM PARAMETERS: HCPCS | Performed by: INTERNAL MEDICINE

## 2018-04-17 PROCEDURE — 3017F COLORECTAL CA SCREEN DOC REV: CPT | Performed by: INTERNAL MEDICINE

## 2018-04-17 PROCEDURE — G8427 DOCREV CUR MEDS BY ELIG CLIN: HCPCS | Performed by: INTERNAL MEDICINE

## 2018-04-17 PROCEDURE — 99214 OFFICE O/P EST MOD 30 MIN: CPT | Performed by: INTERNAL MEDICINE

## 2018-04-17 RX ORDER — CLOPIDOGREL BISULFATE 75 MG/1
75 TABLET ORAL DAILY
COMMUNITY
End: 2019-11-22 | Stop reason: SDUPTHER

## 2018-04-24 ENCOUNTER — HOSPITAL ENCOUNTER (OUTPATIENT)
Dept: CT IMAGING | Age: 63
Discharge: HOME | End: 2018-04-26
Payer: MEDICARE

## 2018-04-24 PROCEDURE — 71250 CT THORAX DX C-: CPT

## 2018-04-25 ENCOUNTER — TELEPHONE (OUTPATIENT)
Dept: INTERNAL MEDICINE | Age: 63
End: 2018-04-25

## 2018-04-26 ENCOUNTER — OFFICE VISIT (OUTPATIENT)
Dept: INTERNAL MEDICINE | Age: 63
End: 2018-04-26
Payer: MEDICARE

## 2018-04-26 VITALS
HEIGHT: 72 IN | DIASTOLIC BLOOD PRESSURE: 93 MMHG | HEART RATE: 79 BPM | SYSTOLIC BLOOD PRESSURE: 135 MMHG | BODY MASS INDEX: 19.21 KG/M2 | WEIGHT: 141.8 LBS

## 2018-04-26 DIAGNOSIS — F10.21 ALCOHOL DEPENDENCE IN REMISSION (HCC): ICD-10-CM

## 2018-04-26 DIAGNOSIS — F10.20 ALCOHOLIC (HCC): ICD-10-CM

## 2018-04-26 DIAGNOSIS — R07.89 CHEST PAIN, ATYPICAL: Primary | ICD-10-CM

## 2018-04-26 DIAGNOSIS — I25.119 CORONARY ARTERY DISEASE INVOLVING NATIVE CORONARY ARTERY OF NATIVE HEART WITH ANGINA PECTORIS (HCC): ICD-10-CM

## 2018-04-26 DIAGNOSIS — I10 ESSENTIAL HYPERTENSION: ICD-10-CM

## 2018-04-26 PROCEDURE — G8427 DOCREV CUR MEDS BY ELIG CLIN: HCPCS | Performed by: HOSPITALIST

## 2018-04-26 PROCEDURE — 99213 OFFICE O/P EST LOW 20 MIN: CPT | Performed by: INTERNAL MEDICINE

## 2018-04-26 PROCEDURE — 4004F PT TOBACCO SCREEN RCVD TLK: CPT | Performed by: HOSPITALIST

## 2018-04-26 PROCEDURE — 99213 OFFICE O/P EST LOW 20 MIN: CPT | Performed by: HOSPITALIST

## 2018-04-26 PROCEDURE — 3017F COLORECTAL CA SCREEN DOC REV: CPT | Performed by: HOSPITALIST

## 2018-04-26 PROCEDURE — G8598 ASA/ANTIPLAT THER USED: HCPCS | Performed by: HOSPITALIST

## 2018-04-26 PROCEDURE — G8420 CALC BMI NORM PARAMETERS: HCPCS | Performed by: HOSPITALIST

## 2018-04-26 RX ORDER — AMLODIPINE BESYLATE 2.5 MG/1
2.5 TABLET ORAL DAILY
Qty: 30 TABLET | Refills: 2 | Status: SHIPPED | OUTPATIENT
Start: 2018-04-26 | End: 2018-07-24 | Stop reason: SDUPTHER

## 2018-04-26 RX ORDER — HYDRALAZINE HYDROCHLORIDE 10 MG/1
10 TABLET, FILM COATED ORAL 3 TIMES DAILY
Qty: 90 TABLET | Refills: 3 | Status: CANCELLED | OUTPATIENT
Start: 2018-04-26

## 2018-05-10 ENCOUNTER — OFFICE VISIT (OUTPATIENT)
Dept: GASTROENTEROLOGY | Age: 63
End: 2018-05-10
Payer: MEDICARE

## 2018-05-10 VITALS
DIASTOLIC BLOOD PRESSURE: 79 MMHG | BODY MASS INDEX: 19.1 KG/M2 | OXYGEN SATURATION: 97 % | SYSTOLIC BLOOD PRESSURE: 109 MMHG | HEART RATE: 87 BPM | WEIGHT: 141 LBS | HEIGHT: 72 IN

## 2018-05-10 DIAGNOSIS — D50.0 IRON DEFICIENCY ANEMIA DUE TO CHRONIC BLOOD LOSS: Primary | ICD-10-CM

## 2018-05-10 PROCEDURE — 3017F COLORECTAL CA SCREEN DOC REV: CPT | Performed by: INTERNAL MEDICINE

## 2018-05-10 PROCEDURE — G8427 DOCREV CUR MEDS BY ELIG CLIN: HCPCS | Performed by: INTERNAL MEDICINE

## 2018-05-10 PROCEDURE — 99213 OFFICE O/P EST LOW 20 MIN: CPT | Performed by: INTERNAL MEDICINE

## 2018-05-10 PROCEDURE — 4004F PT TOBACCO SCREEN RCVD TLK: CPT | Performed by: INTERNAL MEDICINE

## 2018-05-10 PROCEDURE — G8598 ASA/ANTIPLAT THER USED: HCPCS | Performed by: INTERNAL MEDICINE

## 2018-05-10 PROCEDURE — G8420 CALC BMI NORM PARAMETERS: HCPCS | Performed by: INTERNAL MEDICINE

## 2018-05-10 ASSESSMENT — ENCOUNTER SYMPTOMS
ANAL BLEEDING: 0
RECTAL PAIN: 0
SORE THROAT: 0
CONSTIPATION: 1
BACK PAIN: 1
DIARRHEA: 0
SHORTNESS OF BREATH: 0
ABDOMINAL PAIN: 0
VOMITING: 0
NAUSEA: 0
ABDOMINAL DISTENTION: 0
BLOOD IN STOOL: 0
COUGH: 0
CHOKING: 0
TROUBLE SWALLOWING: 0

## 2018-06-17 RX ORDER — NITROGLYCERIN 0.3 MG/1
TABLET SUBLINGUAL
Qty: 100 TABLET | Refills: 0 | Status: SHIPPED | OUTPATIENT
Start: 2018-06-17 | End: 2019-04-19 | Stop reason: SDUPTHER

## 2018-07-12 DIAGNOSIS — I10 ESSENTIAL HYPERTENSION: ICD-10-CM

## 2018-07-12 DIAGNOSIS — I25.10 CORONARY ARTERY DISEASE INVOLVING NATIVE CORONARY ARTERY OF NATIVE HEART WITHOUT ANGINA PECTORIS: ICD-10-CM

## 2018-07-12 DIAGNOSIS — I42.9 CARDIOMYOPATHY (HCC): ICD-10-CM

## 2018-07-12 NOTE — TELEPHONE ENCOUNTER
mild Cardiomyopathy-ischemic ejection fraction 40-45%      Coronary artery disease involving native coronary artery of native heart with angina pectoris ([facility] [address]     recovered Alcoholic quit in 4294     Closed head injury with right temporal parenchymal bleed in 99. Prostate cancer adenocarcinoma. Bess stage 6 staus post laparoscopic prostatectomy 2009.      History of right  inguinal hernia repair     Transfusion history--2008 and 2009      COPD (chronic obstructive pulmonary disease) (HCC)     Erectile dysfunction     Personal history of malignant neoplasm of prostate     Hyperlipidemia     Dehydration     Alcohol dependence in remission ([facility] [address]     CKD (chronic kidney disease)     Prediabetes     History of colon polyps     Anemia     Iron deficiency anemia due to chronic blood loss

## 2018-07-24 ENCOUNTER — OFFICE VISIT (OUTPATIENT)
Dept: INTERNAL MEDICINE | Age: 63
End: 2018-07-24
Payer: MEDICARE

## 2018-07-24 VITALS
HEART RATE: 84 BPM | BODY MASS INDEX: 18.96 KG/M2 | HEIGHT: 72 IN | DIASTOLIC BLOOD PRESSURE: 75 MMHG | WEIGHT: 140 LBS | SYSTOLIC BLOOD PRESSURE: 108 MMHG

## 2018-07-24 DIAGNOSIS — E78.00 PURE HYPERCHOLESTEROLEMIA: ICD-10-CM

## 2018-07-24 DIAGNOSIS — I10 ESSENTIAL HYPERTENSION: Primary | ICD-10-CM

## 2018-07-24 DIAGNOSIS — I25.5 ISCHEMIC CARDIOMYOPATHY: ICD-10-CM

## 2018-07-24 DIAGNOSIS — I10 ESSENTIAL HYPERTENSION: ICD-10-CM

## 2018-07-24 DIAGNOSIS — I25.119 CORONARY ARTERY DISEASE INVOLVING NATIVE CORONARY ARTERY OF NATIVE HEART WITH ANGINA PECTORIS (HCC): ICD-10-CM

## 2018-07-24 DIAGNOSIS — Z23 NEED FOR TDAP VACCINATION: ICD-10-CM

## 2018-07-24 DIAGNOSIS — J44.9 COPD, MODERATE (HCC): ICD-10-CM

## 2018-07-24 DIAGNOSIS — D50.0 IRON DEFICIENCY ANEMIA DUE TO CHRONIC BLOOD LOSS: ICD-10-CM

## 2018-07-24 DIAGNOSIS — R73.02 IGT (IMPAIRED GLUCOSE TOLERANCE): ICD-10-CM

## 2018-07-24 DIAGNOSIS — Z23 NEED FOR SHINGLES VACCINE: ICD-10-CM

## 2018-07-24 LAB — HBA1C MFR BLD: 5.9 %

## 2018-07-24 PROCEDURE — G8420 CALC BMI NORM PARAMETERS: HCPCS | Performed by: INTERNAL MEDICINE

## 2018-07-24 PROCEDURE — 3023F SPIROM DOC REV: CPT | Performed by: INTERNAL MEDICINE

## 2018-07-24 PROCEDURE — G8926 SPIRO NO PERF OR DOC: HCPCS | Performed by: INTERNAL MEDICINE

## 2018-07-24 PROCEDURE — G8427 DOCREV CUR MEDS BY ELIG CLIN: HCPCS | Performed by: INTERNAL MEDICINE

## 2018-07-24 PROCEDURE — 83036 HEMOGLOBIN GLYCOSYLATED A1C: CPT | Performed by: INTERNAL MEDICINE

## 2018-07-24 PROCEDURE — 99213 OFFICE O/P EST LOW 20 MIN: CPT | Performed by: INTERNAL MEDICINE

## 2018-07-24 PROCEDURE — 99214 OFFICE O/P EST MOD 30 MIN: CPT | Performed by: INTERNAL MEDICINE

## 2018-07-24 PROCEDURE — 3017F COLORECTAL CA SCREEN DOC REV: CPT | Performed by: INTERNAL MEDICINE

## 2018-07-24 PROCEDURE — G8598 ASA/ANTIPLAT THER USED: HCPCS | Performed by: INTERNAL MEDICINE

## 2018-07-24 PROCEDURE — 4004F PT TOBACCO SCREEN RCVD TLK: CPT | Performed by: INTERNAL MEDICINE

## 2018-07-24 RX ORDER — AMLODIPINE BESYLATE 2.5 MG/1
2.5 TABLET ORAL DAILY
Qty: 30 TABLET | Refills: 2 | Status: SHIPPED | OUTPATIENT
Start: 2018-07-24 | End: 2018-07-24 | Stop reason: SDUPTHER

## 2018-07-24 RX ORDER — LOVASTATIN 40 MG/1
TABLET ORAL
Qty: 90 TABLET | Refills: 2 | Status: SHIPPED | OUTPATIENT
Start: 2018-07-24 | End: 2019-03-01

## 2018-07-24 RX ORDER — BUDESONIDE AND FORMOTEROL FUMARATE DIHYDRATE 80; 4.5 UG/1; UG/1
2 AEROSOL RESPIRATORY (INHALATION) 2 TIMES DAILY
Qty: 2 INHALER | Refills: 5 | Status: SHIPPED | OUTPATIENT
Start: 2018-07-24 | End: 2019-03-07 | Stop reason: SDUPTHER

## 2018-07-24 NOTE — PROGRESS NOTES
[facility]    Progress Note    Date of patient's visit: 7/24/2018    Patient's Name:  Castro Robin    YOB: 1955            Patient Care Team:  Camille Ruiz MD as PCP - General (Internal Medicine)  Camille Ruiz MD as PCP - S Attributed Provider  LINO Khanna - CNP as Nurse Practitioner (Cardiology)  Magalie Barber RN as   Sophie Denis MD as Consulting Physician (Gastroenterology)    REASON FOR VISIT: Routine outpatient follow     Chief Complaint   Patient presents with    Hypertension    Health Maintenance     Pt labs     Chest Pain     Pt states that he has been having L sided chest pain/ achy feeling that comes and goes. States it has been going on over the last couple of months. its does not last long when it does come. HISTORY OF PRESENT ILLNESS:    History was obtained from the patient. Castro Robin is a 60 y.o. is here for Follow-up on his chronic medical problems. He continues to smoke 7-10 cigarettes a day. He has COPD and ischemic cardiomyopathy. He was having some left-sided chest pain. He saw cardiology in February. He had a stress test last year that was negative. Recently a CTA was also done. He says chest pain seems to be improved after restarting amlodipine. He was started at a low dose of 2.5 mg. He is also on Imdur. His medications were decreased a few months ago as he was having hypotension after his GI bleed. His last hemoglobin is normal.  He's been on file and supplements for 3 months. He has a follow-up with GI next month and with hematology in 2 or 3 months time. He is denying any blood in his stools. He denies any shortness of breath or expectoration at this point. No leg edema.       Results for POC orders placed in visit on 07/24/18   POCT glycosylated hemoglobin (Hb A1C)   Result Value Ref Range    Hemoglobin A1C 5.9 %             EGD  Findings[de-identified]   Esophagus: no gross lesions. Regular Z-line at 42cm. Stomach: Edematous nodular mucosa in gastric body- biopsies taken. No gross lesions in gastric antrum  Duodenum: no gross lesions noted in examined portion of duodenum.      Recommendations:   1. No evidence of active ongoing GI bleeding at this time . Monitor H/H: transfuse as needed. Maintain Hb% >8gm/dL  2. Pathology results to be followed  3. Avoid NSAIDs     Colonoscopy 2018  Findings:      1. A 3mm sessile polyp was found in the rectosigmoid colon- removed with biopsy forceps  2. Non-bleeding small internal hemorrhoids were found on retroflexion     Recommendations:   1. No e/o active ongoing lower GI bleeding at this time . Monitor H/H: transfuse as needed. 2. Pathology results to be followed          CTA chest  ediastinum: Vascular calcifications are noted. Norrine [place] is no pathologic   adenopathy.  Small pericardial effusion is noted.  Hiatal hernia is noted. No pleural effusion is noted       Lungs/pleura: Emphysematous changes are noted bilaterally.  Apical scarring   on the right is again noted.  Multiple tiny right middle lobe nodules are   unchanged.  A few tiny right upper lobe nodules are unchanged as well. Multiple tiny right lower lobe nodular densities are stable.  Left upper lobe   nodular density on image 80 is unchanged.  Multiple left lower lobe tiny   nodular densities are stable.  There is no definite new nodule.       Upper Abdomen: Limited evaluation in the upper abdomen demonstrates no focal   abnormality.       Soft Tissues/Bones: Degenerative changes in the spine are noted.  No   destructive bone lesions are noted.           Impression   Stable exam       Continued follow-up as clinically indicated         NM stress test 2017     FINDINGS:   There is essentially fixed apical/apical inferior wall perfusion defect. Gated images show no hypokinesis. Myocardial thickening is decrease in the apical region. SSS7. SRS4 SDS 3.    Left ventricular ejection TABLET BY MOUTH THREE TIMES DAILY 90 tablet 0    SPIRIVA HANDIHALER 18 MCG inhalation capsule INHALE CONTENTS OF 1 CAPSULE INTO THE LUNGS ONCE DAILY 30 capsule 5    docusate sodium (COLACE) 100 MG capsule Take 100 mg by mouth 2 times daily      lovastatin (MEVACOR) 40 MG tablet TAKE 1 TABLET BY MOUTH EVERY EVENING 90 tablet 2    budesonide-formoterol (SYMBICORT) 80-4.5 MCG/ACT AERO Inhale 2 puffs into the lungs 2 times daily 2 Inhaler 5    acetaminophen (TYLENOL) 325 MG tablet Take 2 tablets by mouth every 4 hours as needed for Pain 20 tablet 0    albuterol sulfate HFA (PROAIR HFA) 108 (90 Base) MCG/ACT inhaler Inhale 2 puffs into the lungs every 6 hours as needed for Wheezing 1 Inhaler 3     No current facility-administered medications on file prior to visit. SOCIAL HISTORY    Reviewed and no change from previous record. Eloy Hernandez  reports that he has been smoking Cigarettes. He has a 25.00 pack-year smoking history. He has never used smokeless tobacco.    FAMILY HISTORY:    Reviewed and No change from previous visit    HEALTH MAINTENANCE DUE:      Health Maintenance Due   Topic Date Due    Hepatitis C screen  1955    HIV screen  10/12/1969    DTaP/Tdap/Td vaccine (1 - Tdap) 10/12/1973    Shingles Vaccine (1 of 2 - 2 Dose Series) 10/12/2004       REVIEW OF SYSTEMS:    12 point review of symptoms completed and found to be normal except noted in the HPI    ROS   Constitutional: Positive for malaise/fatigue. Negative for chills, fever and weight loss. HENT: Positive for congestion. Negative for hearing loss and sinus pain.    Eyes: Negative for blurred vision and redness. Respiratory: Positive for cough and sputum production.    Cardiovascular: Negative for chest pain, palpitations and leg swelling. Gastrointestinal: Positive for constipation. Negative for blood in stool, diarrhea, heartburn, melena and nausea. Genitourinary: Negative for dysuria and frequency.    Neurological: Negative for dizziness, loss of consciousness and headaches. Endo/Heme/Allergies: Negative for polydipsia. Does not bruise/bleed easily. Psychiatric/Behavioral: Negative for depression. The patient is not nervous/anxious.       PHYSICAL EXAM:      Vitals:    07/24/18 1517   BP: 108/75   Site: Left Arm   Position: Sitting   Cuff Size: Small Adult   Pulse: 84   Weight: 140 lb (63.5 kg)   Height: 6' (1.829 m)     Body mass index is 18.99 kg/m². BP Readings from Last 3 Encounters:   07/24/18 108/75   05/10/18 109/79   04/26/18 (!) 135/93        Wt Readings from Last 3 Encounters:   07/24/18 140 lb (63.5 kg)   05/10/18 141 lb (64 kg)   04/26/18 141 lb 12.8 oz (64.3 kg)       Physical Exam    HENT:  Normocephalic, Atraumatic, Bilateral external ears normal, Oropharynx moist,  Nose congested Neck- Normal range of motion, No tenderness, Supple, No goitre  Eyes:  PERRL, EOMI, Conjunctiva normal, No icterus  Respiratory:  distant breath sounds, No respiratory distress, No wheezing, No chest tenderness. Cardiovascular:  Normal heart rate, Normal rhythm, No murmurs,   GI:  Bowel sounds normal, Soft, No tenderness, No masses, No CVA tenderness. Musculoskeletal:  Intact distal pulses, No edema, No tenderness,  No tenderness to palpation or major deformities noted. Back- No tenderness. Integument:  Warm, Dry, No erythema, No rash. Lymphatic:  No lymphadenopathy noted. Neurologic:  Alert & oriented x 3, Normal motor function, Normal sensory function, No focal deficits noted.    Psychiatric:  Affect normal    LABORATORY FINDINGS:    CBC:  Lab Results   Component Value Date    WBC 6.2 04/13/2018    HGB 14.4 04/13/2018     04/13/2018     BMP:    Lab Results   Component Value Date     02/13/2018    K 4.6 02/13/2018    CL 99 02/13/2018    CO2 29 02/13/2018    BUN 9 02/13/2018    CREATININE 1.06 02/13/2018    GLUCOSE 92 02/13/2018     HEMOGLOBIN A1C:   Lab Results   Component Value Date    LABA1C 5.0 01/30/2018 MICROALBUMIN URINE:   Lab Results   Component Value Date    MICROALBUR <12 07/26/2016     FASTING LIPID PANEL:  Lab Results   Component Value Date    CHOL 101 01/23/2018    HDL 31 (L) 01/23/2018    TRIG 79 01/23/2018     Lab Results   Component Value Date    LDLCHOLESTEROL 54 01/23/2018       LIVER PROFILE:  Lab Results   Component Value Date    ALT 7 01/23/2018    AST 13 01/23/2018    PROT 6.2 01/23/2018    BILITOT 0.58 01/23/2018    BILIDIR <0.08 07/26/2016    LABALBU 3.5 01/23/2018      THYROID FUNCTION:   Lab Results   Component Value Date    TSH 0.72 12/09/2013      URINE ANALYSIS: No results found for: LABURIN  ASSESSMENT AND PLAN:    1. Essential hypertension  Same meds    - Basic Metabolic Panel; Future    2. Coronary artery disease involving native coronary artery of native heart with angina pectoris ([facility] [address]    - ECHO Complete 2D W Doppler W Color; Future  - lovastatin (MEVACOR) 40 MG tablet; TAKE 1 TABLET BY MOUTH EVERY EVENING  Dispense: 90 tablet; Refill: 2    3. COPD, moderate    - tiotropium (SPIRIVA HANDIHALER) 18 MCG inhalation capsule; INHALE CONTENTS OF 1 CAPSULE INTO THE LUNGS ONCE DAILY  Dispense: 30 capsule; Refill: 5  - budesonide-formoterol (SYMBICORT) 80-4.5 MCG/ACT AERO; Inhale 2 puffs into the lungs 2 times daily  Dispense: 2 Inhaler; Refill: 5    4. Ischemic cardiomyopathy    - ECHO Complete 2D W Doppler W Color; Future    5. Iron deficiency anemia due to chronic blood loss    - CBC With Auto Differential; Future  - Ferritin; Future  - Iron and TIBC; Future    6. IGT (impaired glucose tolerance)    - POCT glycosylated hemoglobin (Hb A1C)  - Basic Metabolic Panel; Future  - Microalbumin / Creatinine Urine Ratio; Future    7. Pure hypercholesterolemia    - lovastatin (MEVACOR) 40 MG tablet; TAKE 1 TABLET BY MOUTH EVERY EVENING  Dispense: 90 tablet; Refill: 2    8. Need for shingles vaccine    - zoster recombinant adjuvanted vaccine (SHINGRIX) 50 MCG SUSR injection;  Inject 0.5 mLs into the muscle once for 1 dose  Dispense: 0.5 mL; Refill: 0    9. Need for Tdap vaccination    - Tdap (ADACEL) 5-2-15.5 LF-MCG/0.5 injection; Inject 0.5 mLs into the muscle once for 1 dose  Dispense: 0.5 mL; Refill: 0          FOLLOW UP AND INSTRUCTIONS:   Return in about 3 months (around 10/24/2018). 1. Maria Elenajosiah Ugalde received counseling on the following healthy behaviors: nutrition, exercise and medication adherence    2. Reviewed prior labs and health maintenance. 3. Discussed use, benefit, and side effects of prescribed medications. Barriers to medication compliance addressed. All patient questions answered. Pt voiced understanding.        Sergio Collazo  Attending Physician, [address] [place] [address], Internal Medicine Residency Program  [address] [place] [address]  7/24/2018, 3:39 PM

## 2018-07-25 RX ORDER — AMLODIPINE BESYLATE 2.5 MG/1
2.5 TABLET ORAL DAILY
Qty: 90 TABLET | Refills: 2 | Status: SHIPPED | OUTPATIENT
Start: 2018-07-25 | End: 2019-03-07 | Stop reason: SDUPTHER

## 2018-07-27 ENCOUNTER — HOSPITAL ENCOUNTER (OUTPATIENT)
Age: 63
Setting detail: SPECIMEN
Discharge: HOME | End: 2018-07-27
Payer: MEDICARE

## 2018-07-27 DIAGNOSIS — D50.0 IRON DEFICIENCY ANEMIA DUE TO CHRONIC BLOOD LOSS: ICD-10-CM

## 2018-07-27 DIAGNOSIS — R73.02 IGT (IMPAIRED GLUCOSE TOLERANCE): ICD-10-CM

## 2018-07-27 DIAGNOSIS — I10 ESSENTIAL HYPERTENSION: ICD-10-CM

## 2018-07-27 LAB
ABSOLUTE EOS #: 0.16 K/UL (ref 0–0.44)
ABSOLUTE IMMATURE GRANULOCYTE: <0.03 K/UL (ref 0–0.3)
ABSOLUTE LYMPH #: 1.7 K/UL (ref 1.1–3.7)
ABSOLUTE MONO #: 0.31 K/UL (ref 0.1–1.2)
ANION GAP SERPL CALCULATED.3IONS-SCNC: 16 MMOL/L (ref 9–17)
BASOPHILS # BLD: 1 % (ref 0–2)
BASOPHILS ABSOLUTE: 0.03 K/UL (ref 0–0.2)
BUN BLDV-MCNC: 10 MG/DL (ref 8–23)
BUN/CREAT BLD: ABNORMAL (ref 9–20)
CALCIUM SERPL-MCNC: 8.9 MG/DL (ref 8.6–10.4)
CHLORIDE BLD-SCNC: 104 MMOL/L (ref 98–107)
CO2: 21 MMOL/L (ref 20–31)
CREAT SERPL-MCNC: 1.18 MG/DL (ref 0.7–1.2)
CREATININE URINE: 145.9 MG/DL (ref 39–259)
DIFFERENTIAL TYPE: NORMAL
EOSINOPHILS RELATIVE PERCENT: 3 % (ref 1–4)
FERRITIN: 64 UG/L (ref 30–400)
GFR AFRICAN AMERICAN: >60 ML/MIN
GFR NON-AFRICAN AMERICAN: >60 ML/MIN
GFR SERPL CREATININE-BSD FRML MDRD: ABNORMAL ML/MIN/{1.73_M2}
GFR SERPL CREATININE-BSD FRML MDRD: ABNORMAL ML/MIN/{1.73_M2}
GLUCOSE BLD-MCNC: 118 MG/DL (ref 70–99)
HCT VFR BLD CALC: 43.2 % (ref 40.7–50.3)
HEMOGLOBIN: 13.1 G/DL (ref 13–17)
IMMATURE GRANULOCYTES: 0 %
IRON SATURATION: 30 % (ref 20–55)
IRON: 80 UG/DL (ref 59–158)
LYMPHOCYTES # BLD: 31 % (ref 24–43)
MCH RBC QN AUTO: 28.2 PG (ref 25.2–33.5)
MCHC RBC AUTO-ENTMCNC: 30.3 G/DL (ref 28.4–34.8)
MCV RBC AUTO: 92.9 FL (ref 82.6–102.9)
MICROALBUMIN/CREAT 24H UR: <12 MG/L
MICROALBUMIN/CREAT UR-RTO: NORMAL MCG/MG CREAT
MONOCYTES # BLD: 6 % (ref 3–12)
NRBC AUTOMATED: 0 PER 100 WBC
PDW BLD-RTO: 13.8 % (ref 11.8–14.4)
PLATELET # BLD: 255 K/UL (ref 138–453)
PLATELET ESTIMATE: NORMAL
PMV BLD AUTO: 9.1 FL (ref 8.1–13.5)
POTASSIUM SERPL-SCNC: 4.1 MMOL/L (ref 3.7–5.3)
RBC # BLD: 4.65 M/UL (ref 4.21–5.77)
RBC # BLD: NORMAL 10*6/UL
SEG NEUTROPHILS: 59 % (ref 36–65)
SEGMENTED NEUTROPHILS ABSOLUTE COUNT: 3.29 K/UL (ref 1.5–8.1)
SODIUM BLD-SCNC: 141 MMOL/L (ref 135–144)
TOTAL IRON BINDING CAPACITY: 266 UG/DL (ref 250–450)
UNSATURATED IRON BINDING CAPACITY: 186 UG/DL (ref 112–347)
WBC # BLD: 5.5 K/UL (ref 3.5–11.3)
WBC # BLD: NORMAL 10*3/UL

## 2018-07-27 PROCEDURE — 85025 COMPLETE CBC W/AUTO DIFF WBC: CPT

## 2018-07-27 PROCEDURE — 83540 ASSAY OF IRON: CPT

## 2018-07-27 PROCEDURE — 82728 ASSAY OF FERRITIN: CPT

## 2018-07-27 PROCEDURE — 82570 ASSAY OF URINE CREATININE: CPT

## 2018-07-27 PROCEDURE — 82043 UR ALBUMIN QUANTITATIVE: CPT

## 2018-07-27 PROCEDURE — 36415 COLL VENOUS BLD VENIPUNCTURE: CPT

## 2018-07-27 PROCEDURE — 80048 BASIC METABOLIC PNL TOTAL CA: CPT

## 2018-07-27 PROCEDURE — 83550 IRON BINDING TEST: CPT

## 2018-07-31 ENCOUNTER — HOSPITAL ENCOUNTER (OUTPATIENT)
Dept: NON INVASIVE DIAGNOSTICS | Age: 63
Discharge: HOME | End: 2018-07-31
Payer: MEDICARE

## 2018-07-31 DIAGNOSIS — I25.5 ISCHEMIC CARDIOMYOPATHY: ICD-10-CM

## 2018-07-31 DIAGNOSIS — I25.119 CORONARY ARTERY DISEASE INVOLVING NATIVE CORONARY ARTERY OF NATIVE HEART WITH ANGINA PECTORIS (HCC): ICD-10-CM

## 2018-07-31 LAB
LV EF: 35 %
LVEF MODALITY: NORMAL

## 2018-07-31 PROCEDURE — 93306 TTE W/DOPPLER COMPLETE: CPT

## 2018-09-14 RX ORDER — FAMOTIDINE 20 MG/1
TABLET, FILM COATED ORAL
Qty: 60 TABLET | Refills: 0 | Status: SHIPPED | OUTPATIENT
Start: 2018-09-14 | End: 2018-10-03 | Stop reason: SDUPTHER

## 2018-10-03 ENCOUNTER — HOSPITAL ENCOUNTER (OUTPATIENT)
Dept: CARDIAC CATH/INVASIVE PROCEDURES | Age: 63
Discharge: HOME | End: 2018-10-04
Attending: INTERNAL MEDICINE | Admitting: INTERNAL MEDICINE
Payer: MEDICARE

## 2018-10-03 DIAGNOSIS — I25.10 CAD IN NATIVE ARTERY: ICD-10-CM

## 2018-10-03 LAB
GFR NON-AFRICAN AMERICAN: >60 ML/MIN
GFR SERPL CREATININE-BSD FRML MDRD: >60 ML/MIN
GFR SERPL CREATININE-BSD FRML MDRD: NORMAL ML/MIN/{1.73_M2}
GLUCOSE BLD-MCNC: 86 MG/DL (ref 74–100)
PLATELET # BLD: 263 K/UL (ref 138–453)
POC CHLORIDE: 101 MMOL/L (ref 98–107)
POC CREATININE: 1.14 MG/DL (ref 0.51–1.19)
POC HEMATOCRIT: 45 % (ref 41–53)
POC HEMOGLOBIN: 15.3 G/DL (ref 13.5–17.5)
POC POTASSIUM: 4.5 MMOL/L (ref 3.5–4.5)
POC SODIUM: 141 MMOL/L (ref 138–146)

## 2018-10-03 PROCEDURE — 7100000010 HC PHASE II RECOVERY - FIRST 15 MIN

## 2018-10-03 PROCEDURE — 2709999900 HC NON-CHARGEABLE SUPPLY

## 2018-10-03 PROCEDURE — 85049 AUTOMATED PLATELET COUNT: CPT

## 2018-10-03 PROCEDURE — C1725 CATH, TRANSLUMIN NON-LASER: HCPCS

## 2018-10-03 PROCEDURE — 82947 ASSAY GLUCOSE BLOOD QUANT: CPT

## 2018-10-03 PROCEDURE — C9600 PERC DRUG-EL COR STENT SING: HCPCS | Performed by: INTERNAL MEDICINE

## 2018-10-03 PROCEDURE — C1769 GUIDE WIRE: HCPCS

## 2018-10-03 PROCEDURE — G0378 HOSPITAL OBSERVATION PER HR: HCPCS

## 2018-10-03 PROCEDURE — 84132 ASSAY OF SERUM POTASSIUM: CPT

## 2018-10-03 PROCEDURE — C1894 INTRO/SHEATH, NON-LASER: HCPCS

## 2018-10-03 PROCEDURE — 94640 AIRWAY INHALATION TREATMENT: CPT

## 2018-10-03 PROCEDURE — 7100000011 HC PHASE II RECOVERY - ADDTL 15 MIN

## 2018-10-03 PROCEDURE — 82565 ASSAY OF CREATININE: CPT

## 2018-10-03 PROCEDURE — 84295 ASSAY OF SERUM SODIUM: CPT

## 2018-10-03 PROCEDURE — 6370000000 HC RX 637 (ALT 250 FOR IP): Performed by: STUDENT IN AN ORGANIZED HEALTH CARE EDUCATION/TRAINING PROGRAM

## 2018-10-03 PROCEDURE — 6360000002 HC RX W HCPCS

## 2018-10-03 PROCEDURE — 93454 CORONARY ARTERY ANGIO S&I: CPT | Performed by: INTERNAL MEDICINE

## 2018-10-03 PROCEDURE — 6360000004 HC RX CONTRAST MEDICATION

## 2018-10-03 PROCEDURE — C1876 STENT, NON-COA/NON-COV W/DEL: HCPCS

## 2018-10-03 PROCEDURE — 6370000000 HC RX 637 (ALT 250 FOR IP)

## 2018-10-03 PROCEDURE — 82435 ASSAY OF BLOOD CHLORIDE: CPT

## 2018-10-03 PROCEDURE — C1887 CATHETER, GUIDING: HCPCS

## 2018-10-03 PROCEDURE — 2500000003 HC RX 250 WO HCPCS

## 2018-10-03 PROCEDURE — 85014 HEMATOCRIT: CPT

## 2018-10-03 RX ORDER — FAMOTIDINE 20 MG/1
20 TABLET, FILM COATED ORAL 2 TIMES DAILY
Status: DISCONTINUED | OUTPATIENT
Start: 2018-10-03 | End: 2018-10-04 | Stop reason: HOSPADM

## 2018-10-03 RX ORDER — CLOPIDOGREL BISULFATE 75 MG/1
75 TABLET ORAL DAILY
Status: DISCONTINUED | OUTPATIENT
Start: 2018-10-04 | End: 2018-10-04 | Stop reason: HOSPADM

## 2018-10-03 RX ORDER — AMLODIPINE BESYLATE 2.5 MG/1
2.5 TABLET ORAL DAILY
Status: DISCONTINUED | OUTPATIENT
Start: 2018-10-04 | End: 2018-10-04 | Stop reason: HOSPADM

## 2018-10-03 RX ORDER — ALBUTEROL SULFATE 90 UG/1
2 AEROSOL, METERED RESPIRATORY (INHALATION) EVERY 6 HOURS PRN
Status: DISCONTINUED | OUTPATIENT
Start: 2018-10-03 | End: 2018-10-04 | Stop reason: HOSPADM

## 2018-10-03 RX ORDER — DOCUSATE SODIUM 100 MG/1
100 CAPSULE, LIQUID FILLED ORAL 2 TIMES DAILY
Status: DISCONTINUED | OUTPATIENT
Start: 2018-10-03 | End: 2018-10-04 | Stop reason: HOSPADM

## 2018-10-03 RX ORDER — SODIUM CHLORIDE 0.9 % (FLUSH) 0.9 %
10 SYRINGE (ML) INJECTION EVERY 12 HOURS SCHEDULED
Status: DISCONTINUED | OUTPATIENT
Start: 2018-10-03 | End: 2018-10-04 | Stop reason: HOSPADM

## 2018-10-03 RX ORDER — ONDANSETRON 2 MG/ML
4 INJECTION INTRAMUSCULAR; INTRAVENOUS EVERY 6 HOURS PRN
Status: DISCONTINUED | OUTPATIENT
Start: 2018-10-03 | End: 2018-10-04 | Stop reason: HOSPADM

## 2018-10-03 RX ORDER — ACETAMINOPHEN 325 MG/1
650 TABLET ORAL EVERY 4 HOURS PRN
Status: DISCONTINUED | OUTPATIENT
Start: 2018-10-03 | End: 2018-10-04 | Stop reason: HOSPADM

## 2018-10-03 RX ORDER — SODIUM CHLORIDE 0.9 % (FLUSH) 0.9 %
10 SYRINGE (ML) INJECTION PRN
Status: DISCONTINUED | OUTPATIENT
Start: 2018-10-03 | End: 2018-10-04 | Stop reason: HOSPADM

## 2018-10-03 RX ORDER — ASPIRIN 81 MG/1
81 TABLET, CHEWABLE ORAL DAILY
Status: DISCONTINUED | OUTPATIENT
Start: 2018-10-04 | End: 2018-10-04 | Stop reason: HOSPADM

## 2018-10-03 RX ORDER — SODIUM CHLORIDE 9 MG/ML
INJECTION, SOLUTION INTRAVENOUS ONCE
Status: DISCONTINUED | OUTPATIENT
Start: 2018-10-03 | End: 2018-10-03

## 2018-10-03 RX ORDER — LISINOPRIL 2.5 MG/1
2.5 TABLET ORAL DAILY
Status: DISCONTINUED | OUTPATIENT
Start: 2018-10-04 | End: 2018-10-04 | Stop reason: HOSPADM

## 2018-10-03 RX ORDER — ISOSORBIDE MONONITRATE 30 MG/1
30 TABLET, EXTENDED RELEASE ORAL DAILY
Status: DISCONTINUED | OUTPATIENT
Start: 2018-10-04 | End: 2018-10-04 | Stop reason: HOSPADM

## 2018-10-03 RX ORDER — ATORVASTATIN CALCIUM 80 MG/1
80 TABLET, FILM COATED ORAL DAILY
Status: DISCONTINUED | OUTPATIENT
Start: 2018-10-03 | End: 2018-10-04 | Stop reason: HOSPADM

## 2018-10-03 RX ADMIN — ATORVASTATIN CALCIUM 80 MG: 80 TABLET, FILM COATED ORAL at 22:03

## 2018-10-03 RX ADMIN — SODIUM CHLORIDE: 9 INJECTION, SOLUTION INTRAVENOUS at 15:30

## 2018-10-03 RX ADMIN — MOMETASONE FUROATE AND FORMOTEROL FUMARATE DIHYDRATE 2 PUFF: 100; 5 AEROSOL RESPIRATORY (INHALATION) at 20:17

## 2018-10-03 RX ADMIN — METOPROLOL TARTRATE 25 MG: 25 TABLET ORAL at 22:03

## 2018-10-03 NOTE — PROCEDURES
[facility]    CARDIAC CATHETERIZATION    Date:   10/3/2018  Patient name: Angie Kidd  Date of admission:  10/3/2018 11:03 AM  MRN:   [id number]  YOB: 1955    Operators:  Primary:  Dr. Jessa Zaldivar   CV Fellow: Dr. Elsa Escalante    Pre Procedure Conscious Sedation Data:    ASA Class:    [] I [x] II [] III [] IV    Mallampati Class:  [] I [x] II [] III [] IV     Indication:  [] STEMI      [x] + Stress test  [] ACS      [] + EKG Changes  [] Non Q MI       [x] Significant Risk Factors  [x] Recurrent Angina             [] Diabetes Mellitus    [] New LBBB      [] Uncontrolled HTN. [] CHF / Low EF changes     [] Abnormal CTA / Ca Score    Procedure:  Access:  [x] Femoral  [] Radial  artery       [x] Right  [] Left    Procedure: After informed consent was obtained with explanation of the risks and benefits, patient was brought to the cath lab. The access area was prepped and draped in sterile fashion. 1% lidocaine was used for local block. The artery was cannulated with 6  Fr sheath with brisk arterial blood return. The side port was frequently flushed and aspirated with normal saline. Findings:    LMCA: Mild irregularities 10-20%. LAD: 50% mid stenosis    LCx: patent proximal stent with 30% mid stenosis. OM1 has 50% ostial stenosis    RCA: 99% mid stenosis reduced to 0% using 4x28 vision stent and post dilated using 4 mm NC balloon.                        The LV gram was not performed. Conclusions:   1 99% mid RCA stenosis reduced to 0% using 4x28 vision stent and post   dilated using 4 mm NC balloon. 2  Nonobstructive disease of other arteries. 3   BMS used due to large stent (4 mm) and history of gastritis.     Recommendation:  Dual antiplatelet therapy and risk factor modifications          History and Risk Factors    [x] Hypertension     [] Family history of CAD  [x] Hyperlipidemia     [] Cerebrovascular Disease   [x] Prior MI       [] Peripheral Vascular disease   [x] Prior PCI [] Diabetes Mellitus    [] Left Main PCI. [] Currently on Dialysis. [] Prior CABG. [x] Currently smoker. [] Cardiac Arrest outside of healthcare facility. [] Yes    [x] No        Witnessed     [] Yes   [] No     Arrest after arrival of EMS  [] Yes   [] No     [] Cardiac Arrest at other Facility. [] Yes   [x] No    Pre-Procedure Information. Heart Failure       [] Yes    [x] No        Class  [] I      [] II  [] III    [] IV. New Diagnosis    [] Yes  [] No    HF Type      [] Systolic   [] Diastolic          [] Unknown. Diagnostic Test:   EKG       [] Normal   [] Abnormal    New antiarrhythmia medications:    [] Yes   [x] No   New onset atrial fibrillation / Flutter     [] Yes   [x] No   ECG Abnormalities:      [] V. Fib   [] Kandace V. Tach           [] NS V. T   [] New LBBB           [] T. Inv  []  ST dev > 0.5 mm         [] PVC's freq  [] PVC's infrequent    Stress Test Performed:   [x] Yes    [] No     Type:     [] Stress Echo   [] Exercise Stress Test (no imaging)      [x] Stress Nuclear  [] Stress Imaging     Results   [] Negative   [x] Positive        [] Indeterminate  [] Unavailable     If Positive/ Risk / Extent of Ischemia:       [] Low  [x] Intermediate         [] High  [] Unavailable      Cardiac CTA Performed:  [] Yes    [x] No      Results   [] CAD   [] Non obstructive CAD      [] No CAD   [] Uncertain      [] Unknown   [] Structural Disease. Pre Procedure Medications: [x] Yes    [] No         [] ASA  [x] Beta Blockers      [] Nitrate  [] Ca Channel Blockers      [] Ranolazine  [x] Statin       [x] Plavix/Others antiplatelets        Electronically signed by Ameena Anderson MD on 10/3/2018 at 2:32 PM  Fellow Cardiology  Gerhard Taveras MD, Attending physician  [facility]

## 2018-10-03 NOTE — H&P
Attestation signed by      Attending Physician Statement:    I have discussed the care of  Manoj Stevens , including pertinent history and exam findings, with the Cardiology fellow/resident. I have seen and examined the patient and the key elements of all parts of the encounter have been performed by me. I agree with the assessment, plan and orders as documented by the fellow/resident, after I modified exam findings and plan of treatments, and the final version is my approved version of the assessment. Additional Comments: [facility]    Consult / H&P               Today's Date: 10/3/2018  Patient Name: Manoj Stevens  Date of admission: No admission date for patient encounter. Patient's age: 60 y. o., 1955  Admission Dx: No admission diagnoses are documented for this encounter. Reason for Consult:  Cardiac evaluation    Requesting Physician: No admitting provider for patient encounter. CHIEF COMPLAINT:  Elective cath    History Obtained From:  patient, electronic medical record    HISTORY OF PRESENT ILLNESS:      The patient is a 60 y.o.  male who is admitted to the hospital for elective cath and seen by Dr. Radha Fall on the 8/31/2018 for the chest pain. Denies any contrast allergy, any upcoming surgery    Cardiac History  1. CAD, S/P drug-eluting stent using 3.5 x 16 mm Taxus stent to the proximal left circumflex in October 2006. There was 60% branch vessel diagonal stenosis, otherwise no significant CAD. 2. Repeat cardiac cath done in October 2015 showed widely patent left circumflex stent with 40-50% stenosis proximal to the stent and moderate RCA disease. Medical treatment was advised. 3 Lexiscan stress test( 9/2018) showing inferior  Apical infarction with periinfarct ischemia and inferior apical hypokinesis      Past Medical History:   has a past medical history of Alcohol withdrawal seizure ([facility] [address]  Blood loss anemia; CAD (coronary artery Results   Component Value Date    HDL 31 01/23/2018    TRIG 79 01/23/2018     LIVER PROFILE:No results for input(s): AST, ALT, LABALBU in the last 72 hours. IMPRESSION:    Patient Active Problem List   Diagnosis    COPD, moderate    HTN (hypertension)    Hyperlipemia    Smoker    mild Cardiomyopathy-ischemic ejection fraction 40-45%     Coronary artery disease involving native coronary artery of native heart with angina pectoris ([address]    recovered Alcoholic quit in 2036    Closed head injury with right temporal parenchymal bleed in 99.  Prostate cancer adenocarcinoma. [place] stage 6 staus post laparoscopic prostatectomy 2009.  History of right  inguinal hernia repair    Transfusion history--2008 and 2009     COPD (chronic obstructive pulmonary disease) ([address]    Erectile dysfunction    Personal history of malignant neoplasm of prostate    Hyperlipidemia    Dehydration    Alcohol dependence in remission ([facility] [address]    CKD (chronic kidney disease)    Prediabetes    History of colon polyps    Anemia    Iron deficiency anemia due to chronic blood loss     1 Stable Angina with inferior apical infarction with intermediate risk  2 H/O CAD S/P Lcx stent in past  3 COPD  4 Hypertension      RECOMMENDATIONS:  1. Will recommend cardiac cath after discussing with Dr. Rebecca Omer  2. Post cath orders to follow      Discussed with patient and Nurse.     Electronically signed by Bon Rodriguez MD on 10/3/2018 at 9:17 AM  Fellow Cardiology

## 2018-10-04 VITALS
TEMPERATURE: 98.8 F | BODY MASS INDEX: 18.18 KG/M2 | OXYGEN SATURATION: 99 % | HEART RATE: 90 BPM | WEIGHT: 134.2 LBS | DIASTOLIC BLOOD PRESSURE: 82 MMHG | RESPIRATION RATE: 18 BRPM | HEIGHT: 72 IN | SYSTOLIC BLOOD PRESSURE: 149 MMHG

## 2018-10-04 LAB
ANION GAP SERPL CALCULATED.3IONS-SCNC: 12 MMOL/L (ref 9–17)
BUN BLDV-MCNC: 7 MG/DL (ref 8–23)
BUN/CREAT BLD: ABNORMAL (ref 9–20)
CALCIUM SERPL-MCNC: 8.8 MG/DL (ref 8.6–10.4)
CHLORIDE BLD-SCNC: 105 MMOL/L (ref 98–107)
CO2: 23 MMOL/L (ref 20–31)
CREAT SERPL-MCNC: 1.08 MG/DL (ref 0.7–1.2)
GFR AFRICAN AMERICAN: >60 ML/MIN
GFR NON-AFRICAN AMERICAN: >60 ML/MIN
GFR SERPL CREATININE-BSD FRML MDRD: ABNORMAL ML/MIN/{1.73_M2}
GFR SERPL CREATININE-BSD FRML MDRD: ABNORMAL ML/MIN/{1.73_M2}
GLUCOSE BLD-MCNC: 100 MG/DL (ref 70–99)
HCT VFR BLD CALC: 44.2 % (ref 40.7–50.3)
HEMOGLOBIN: 13.9 G/DL (ref 13–17)
MCH RBC QN AUTO: 29.5 PG (ref 25.2–33.5)
MCHC RBC AUTO-ENTMCNC: 31.4 G/DL (ref 28.4–34.8)
MCV RBC AUTO: 93.8 FL (ref 82.6–102.9)
NRBC AUTOMATED: 0 PER 100 WBC
PDW BLD-RTO: 13.2 % (ref 11.8–14.4)
PLATELET # BLD: 216 K/UL (ref 138–453)
PMV BLD AUTO: 9.1 FL (ref 8.1–13.5)
POTASSIUM SERPL-SCNC: 4 MMOL/L (ref 3.7–5.3)
RBC # BLD: 4.71 M/UL (ref 4.21–5.77)
SODIUM BLD-SCNC: 140 MMOL/L (ref 135–144)
WBC # BLD: 6 K/UL (ref 3.5–11.3)

## 2018-10-04 PROCEDURE — G0378 HOSPITAL OBSERVATION PER HR: HCPCS

## 2018-10-04 PROCEDURE — 2580000003 HC RX 258: Performed by: STUDENT IN AN ORGANIZED HEALTH CARE EDUCATION/TRAINING PROGRAM

## 2018-10-04 PROCEDURE — 99406 BEHAV CHNG SMOKING 3-10 MIN: CPT

## 2018-10-04 PROCEDURE — 85027 COMPLETE CBC AUTOMATED: CPT

## 2018-10-04 PROCEDURE — 6370000000 HC RX 637 (ALT 250 FOR IP): Performed by: STUDENT IN AN ORGANIZED HEALTH CARE EDUCATION/TRAINING PROGRAM

## 2018-10-04 PROCEDURE — 80048 BASIC METABOLIC PNL TOTAL CA: CPT

## 2018-10-04 PROCEDURE — 36415 COLL VENOUS BLD VENIPUNCTURE: CPT

## 2018-10-04 RX ORDER — ASPIRIN 81 MG/1
81 TABLET, CHEWABLE ORAL DAILY
Qty: 30 TABLET | Refills: 3 | Status: SHIPPED | OUTPATIENT
Start: 2018-10-05

## 2018-10-04 RX ADMIN — ATORVASTATIN CALCIUM 80 MG: 80 TABLET, FILM COATED ORAL at 09:06

## 2018-10-04 RX ADMIN — Medication 10 ML: at 09:11

## 2018-10-04 RX ADMIN — LISINOPRIL 2.5 MG: 2.5 TABLET ORAL at 09:06

## 2018-10-04 RX ADMIN — FAMOTIDINE 20 MG: 20 TABLET, FILM COATED ORAL at 09:06

## 2018-10-04 RX ADMIN — CLOPIDOGREL 75 MG: 75 TABLET, FILM COATED ORAL at 09:06

## 2018-10-04 RX ADMIN — ISOSORBIDE MONONITRATE 30 MG: 30 TABLET ORAL at 09:06

## 2018-10-04 RX ADMIN — METOPROLOL TARTRATE 25 MG: 25 TABLET ORAL at 09:06

## 2018-10-04 RX ADMIN — ASPIRIN 81 MG: 81 TABLET, CHEWABLE ORAL at 09:06

## 2018-10-04 RX ADMIN — DOCUSATE SODIUM 100 MG: 100 CAPSULE, LIQUID FILLED ORAL at 09:06

## 2018-10-04 RX ADMIN — AMLODIPINE BESYLATE 2.5 MG: 2.5 TABLET ORAL at 09:06

## 2018-10-04 NOTE — DISCHARGE SUMMARY
non-tender, without masses or organomegaly  Lower extremity edema: none. Right femoral site CDI. No hematoma, drainage, or swelling. Distal pulse +2     Follow up with primary care provider 1 week  Follow up with cardiology 4 weeks  Follow up with other consultant physicians at their directions. Discharge Medications:   Nathan Wolfe   Home Medication Instructions ICE:[id number]    Printed on:10/04/18 0044   Medication Information                      acetaminophen (TYLENOL) 325 MG tablet  Take 2 tablets by mouth every 4 hours as needed for Pain             albuterol sulfate HFA (PROAIR HFA) 108 (90 Base) MCG/ACT inhaler  Inhale 2 puffs into the lungs every 6 hours as needed for Wheezing             amLODIPine (NORVASC) 2.5 MG tablet  TAKE 1 TABLET BY MOUTH DAILY             budesonide-formoterol (SYMBICORT) 80-4.5 MCG/ACT AERO  Inhale 2 puffs into the lungs 2 times daily             clopidogrel (PLAVIX) 75 MG tablet  Take 75 mg by mouth daily             docusate sodium (COLACE) 100 MG capsule  Take 100 mg by mouth 2 times daily             famotidine (PEPCID) 20 MG tablet  Take 20 mg by mouth 2 times daily             ferrous sulfate 325 (65 Fe) MG EC tablet  TAKE 1 TABLET BY MOUTH THREE TIMES DAILY             isosorbide mononitrate (IMDUR) 30 MG extended release tablet  Take 1 tablet by mouth daily             lisinopril (PRINIVIL;ZESTRIL) 2.5 MG tablet  TAKE 1 TABLET BY MOUTH EVERY DAY             lovastatin (MEVACOR) 40 MG tablet  TAKE 1 TABLET BY MOUTH EVERY EVENING             metoprolol tartrate (LOPRESSOR) 25 MG tablet  TAKE 1 TABLET BY MOUTH TWICE DAILY             nitroGLYCERIN (NITROSTAT) 0.3 MG SL tablet  DISSOLVE 1 TABLET UNDER THE TONGUE EVERY 5 MINTUES AS NEEDED FOR CHEST PAIN UNTIL RELIEF IS OBTAINED, IF PAIN PERSISTS CALL 911.              tiotropium (SPIRIVA HANDIHALER) 18 MCG inhalation capsule  INHALE CONTENTS OF 1 CAPSULE INTO THE LUNGS ONCE DAILY                Findings:     LMCA: Mild

## 2018-10-09 ENCOUNTER — HOSPITAL ENCOUNTER (OUTPATIENT)
Facility: MEDICAL CENTER | Age: 63
Discharge: HOME | End: 2018-10-09
Payer: MEDICARE

## 2018-10-09 DIAGNOSIS — D50.0 IRON DEFICIENCY ANEMIA DUE TO CHRONIC BLOOD LOSS: ICD-10-CM

## 2018-10-09 LAB
ABSOLUTE EOS #: 0.1 K/UL (ref 0–0.4)
ABSOLUTE IMMATURE GRANULOCYTE: ABNORMAL K/UL (ref 0–0.3)
ABSOLUTE LYMPH #: 1.5 K/UL (ref 1–4.8)
ABSOLUTE MONO #: 0.5 K/UL (ref 0.2–0.8)
BASOPHILS # BLD: 0 % (ref 0–2)
BASOPHILS ABSOLUTE: 0 K/UL (ref 0–0.2)
DIFFERENTIAL TYPE: ABNORMAL
EOSINOPHILS RELATIVE PERCENT: 3 % (ref 1–4)
FERRITIN: 57 UG/L (ref 30–400)
HCT VFR BLD CALC: 40.7 % (ref 41–53)
HEMOGLOBIN: 13.5 G/DL (ref 13.5–17.5)
IMMATURE GRANULOCYTES: ABNORMAL %
IRON SATURATION: 46 % (ref 20–55)
IRON: 119 UG/DL (ref 59–158)
LYMPHOCYTES # BLD: 32 % (ref 24–44)
MCH RBC QN AUTO: 29.8 PG (ref 26–34)
MCHC RBC AUTO-ENTMCNC: 33.3 G/DL (ref 31–37)
MCV RBC AUTO: 89.6 FL (ref 80–100)
MONOCYTES # BLD: 10 % (ref 1–7)
NRBC AUTOMATED: ABNORMAL PER 100 WBC
PDW BLD-RTO: 13.3 % (ref 11.5–14.5)
PLATELET # BLD: 269 K/UL (ref 130–400)
PLATELET ESTIMATE: ABNORMAL
PMV BLD AUTO: 7 FL (ref 6–12)
RBC # BLD: 4.55 M/UL (ref 4.5–5.9)
RBC # BLD: ABNORMAL 10*6/UL
SEG NEUTROPHILS: 55 % (ref 36–66)
SEGMENTED NEUTROPHILS ABSOLUTE COUNT: 2.5 K/UL (ref 1.8–7.7)
TOTAL IRON BINDING CAPACITY: 261 UG/DL (ref 250–450)
UNSATURATED IRON BINDING CAPACITY: 142 UG/DL (ref 112–347)
WBC # BLD: 4.6 K/UL (ref 3.5–11)
WBC # BLD: ABNORMAL 10*3/UL

## 2018-10-09 PROCEDURE — 85025 COMPLETE CBC W/AUTO DIFF WBC: CPT

## 2018-10-09 PROCEDURE — 82728 ASSAY OF FERRITIN: CPT

## 2018-10-09 PROCEDURE — 36415 COLL VENOUS BLD VENIPUNCTURE: CPT

## 2018-10-09 PROCEDURE — 83540 ASSAY OF IRON: CPT

## 2018-10-09 PROCEDURE — 83550 IRON BINDING TEST: CPT

## 2018-10-10 ENCOUNTER — HOSPITAL ENCOUNTER (OUTPATIENT)
Facility: MEDICAL CENTER | Age: 63
End: 2018-10-10
Payer: MEDICARE

## 2018-10-16 ENCOUNTER — TELEPHONE (OUTPATIENT)
Dept: ONCOLOGY | Age: 63
End: 2018-10-16

## 2018-10-16 ENCOUNTER — OFFICE VISIT (OUTPATIENT)
Dept: ONCOLOGY | Age: 63
End: 2018-10-16
Payer: MEDICARE

## 2018-10-16 VITALS
WEIGHT: 140.3 LBS | HEART RATE: 82 BPM | DIASTOLIC BLOOD PRESSURE: 79 MMHG | RESPIRATION RATE: 18 BRPM | SYSTOLIC BLOOD PRESSURE: 114 MMHG | BODY MASS INDEX: 19.03 KG/M2 | TEMPERATURE: 97.4 F

## 2018-10-16 DIAGNOSIS — N18.30 STAGE 3 CHRONIC KIDNEY DISEASE (HCC): ICD-10-CM

## 2018-10-16 DIAGNOSIS — D50.0 IRON DEFICIENCY ANEMIA DUE TO CHRONIC BLOOD LOSS: Primary | ICD-10-CM

## 2018-10-16 DIAGNOSIS — C61 PROSTATE CANCER (HCC): ICD-10-CM

## 2018-10-16 PROCEDURE — 4004F PT TOBACCO SCREEN RCVD TLK: CPT | Performed by: INTERNAL MEDICINE

## 2018-10-16 PROCEDURE — 99211 OFF/OP EST MAY X REQ PHY/QHP: CPT

## 2018-10-16 PROCEDURE — 99214 OFFICE O/P EST MOD 30 MIN: CPT | Performed by: INTERNAL MEDICINE

## 2018-10-16 PROCEDURE — G8420 CALC BMI NORM PARAMETERS: HCPCS | Performed by: INTERNAL MEDICINE

## 2018-10-16 PROCEDURE — G8598 ASA/ANTIPLAT THER USED: HCPCS | Performed by: INTERNAL MEDICINE

## 2018-10-16 PROCEDURE — G8484 FLU IMMUNIZE NO ADMIN: HCPCS | Performed by: INTERNAL MEDICINE

## 2018-10-16 PROCEDURE — G8427 DOCREV CUR MEDS BY ELIG CLIN: HCPCS | Performed by: INTERNAL MEDICINE

## 2018-10-16 PROCEDURE — 3017F COLORECTAL CA SCREEN DOC REV: CPT | Performed by: INTERNAL MEDICINE

## 2018-10-24 ENCOUNTER — HOSPITAL ENCOUNTER (OUTPATIENT)
Dept: CARDIAC REHAB | Age: 63
Setting detail: THERAPIES SERIES
Discharge: HOME | End: 2018-10-24
Payer: MEDICARE

## 2018-10-24 VITALS — BODY MASS INDEX: 19.29 KG/M2 | HEIGHT: 72 IN | WEIGHT: 142.4 LBS

## 2018-10-24 PROCEDURE — 93798 PHYS/QHP OP CAR RHAB W/ECG: CPT

## 2018-10-24 ASSESSMENT — PATIENT HEALTH QUESTIONNAIRE - PHQ9
SUM OF ALL RESPONSES TO PHQ QUESTIONS 1-9: 0
SUM OF ALL RESPONSES TO PHQ QUESTIONS 1-9: 0

## 2018-10-25 ENCOUNTER — HOSPITAL ENCOUNTER (OUTPATIENT)
Age: 63
Setting detail: SPECIMEN
Discharge: HOME | End: 2018-10-25
Payer: MEDICARE

## 2018-10-25 ENCOUNTER — OFFICE VISIT (OUTPATIENT)
Dept: INTERNAL MEDICINE | Age: 63
End: 2018-10-25
Payer: MEDICARE

## 2018-10-25 VITALS
BODY MASS INDEX: 19.53 KG/M2 | HEART RATE: 74 BPM | DIASTOLIC BLOOD PRESSURE: 83 MMHG | SYSTOLIC BLOOD PRESSURE: 109 MMHG | WEIGHT: 144 LBS

## 2018-10-25 DIAGNOSIS — Z98.61 CAD S/P PERCUTANEOUS CORONARY ANGIOPLASTY: ICD-10-CM

## 2018-10-25 DIAGNOSIS — C61 PROSTATE CANCER (HCC): ICD-10-CM

## 2018-10-25 DIAGNOSIS — I25.10 CAD S/P PERCUTANEOUS CORONARY ANGIOPLASTY: ICD-10-CM

## 2018-10-25 DIAGNOSIS — E78.00 PURE HYPERCHOLESTEROLEMIA: ICD-10-CM

## 2018-10-25 DIAGNOSIS — Z98.61 CAD S/P PERCUTANEOUS CORONARY ANGIOPLASTY: Primary | ICD-10-CM

## 2018-10-25 DIAGNOSIS — R73.03 PREDIABETES: ICD-10-CM

## 2018-10-25 DIAGNOSIS — I25.10 CAD S/P PERCUTANEOUS CORONARY ANGIOPLASTY: Primary | ICD-10-CM

## 2018-10-25 DIAGNOSIS — J44.9 COPD, MODERATE (HCC): ICD-10-CM

## 2018-10-25 DIAGNOSIS — Z23 NEEDS FLU SHOT: ICD-10-CM

## 2018-10-25 DIAGNOSIS — D50.0 IRON DEFICIENCY ANEMIA DUE TO CHRONIC BLOOD LOSS: ICD-10-CM

## 2018-10-25 DIAGNOSIS — I10 ESSENTIAL HYPERTENSION: ICD-10-CM

## 2018-10-25 DIAGNOSIS — I25.5 ISCHEMIC CARDIOMYOPATHY: ICD-10-CM

## 2018-10-25 LAB
PROSTATE SPECIFIC ANTIGEN: <0.01 UG/L
TSH SERPL DL<=0.05 MIU/L-ACNC: 1.53 MIU/L (ref 0.3–5)

## 2018-10-25 PROCEDURE — 3017F COLORECTAL CA SCREEN DOC REV: CPT | Performed by: INTERNAL MEDICINE

## 2018-10-25 PROCEDURE — G8598 ASA/ANTIPLAT THER USED: HCPCS | Performed by: INTERNAL MEDICINE

## 2018-10-25 PROCEDURE — 90686 IIV4 VACC NO PRSV 0.5 ML IM: CPT | Performed by: INTERNAL MEDICINE

## 2018-10-25 PROCEDURE — 99214 OFFICE O/P EST MOD 30 MIN: CPT | Performed by: INTERNAL MEDICINE

## 2018-10-25 PROCEDURE — G8482 FLU IMMUNIZE ORDER/ADMIN: HCPCS | Performed by: INTERNAL MEDICINE

## 2018-10-25 PROCEDURE — 84153 ASSAY OF PSA TOTAL: CPT

## 2018-10-25 PROCEDURE — 84443 ASSAY THYROID STIM HORMONE: CPT

## 2018-10-25 PROCEDURE — 99211 OFF/OP EST MAY X REQ PHY/QHP: CPT | Performed by: INTERNAL MEDICINE

## 2018-10-25 PROCEDURE — G8926 SPIRO NO PERF OR DOC: HCPCS | Performed by: INTERNAL MEDICINE

## 2018-10-25 PROCEDURE — 36415 COLL VENOUS BLD VENIPUNCTURE: CPT

## 2018-10-25 PROCEDURE — 4004F PT TOBACCO SCREEN RCVD TLK: CPT | Performed by: INTERNAL MEDICINE

## 2018-10-25 PROCEDURE — G8420 CALC BMI NORM PARAMETERS: HCPCS | Performed by: INTERNAL MEDICINE

## 2018-10-25 PROCEDURE — 3023F SPIROM DOC REV: CPT | Performed by: INTERNAL MEDICINE

## 2018-10-25 PROCEDURE — G8427 DOCREV CUR MEDS BY ELIG CLIN: HCPCS | Performed by: INTERNAL MEDICINE

## 2018-10-25 RX ORDER — FAMOTIDINE 20 MG/1
TABLET, FILM COATED ORAL
Qty: 60 TABLET | Refills: 0 | Status: SHIPPED | OUTPATIENT
Start: 2018-10-25 | End: 2018-10-25 | Stop reason: SDUPTHER

## 2018-10-25 RX ORDER — FAMOTIDINE 20 MG/1
TABLET, FILM COATED ORAL
Qty: 60 TABLET | Refills: 3 | Status: SHIPPED | OUTPATIENT
Start: 2018-10-25 | End: 2019-04-04 | Stop reason: SDUPTHER

## 2018-10-25 NOTE — PATIENT INSTRUCTIONS
Your doctor has ordered blood or urine testing. You can get this testing done at the Lab located on the first floor of the [facility], or at any other [facility]. Please stop at Main Registration, before going to the lab, as you must be registered first.     Please get this lab done before your next appointment          Return appointment card and Summary of Care was reviewed and copy was given to the patient.   ENRIQUETA

## 2018-10-25 NOTE — PROGRESS NOTES
[facility]    Progress Note    Date of patient's visit: 10/25/2018    Patient's Name:  Rubin Almeida    YOB: 1955            Patient Care Team:  Josy Bowser MD as PCP - General (Internal Medicine)  Josy Bowser MD as PCP - S Attributed Provider  LINO Akhtar - CNP as Nurse Practitioner (Cardiology)  Angel Dooley RN as   Ovidio Kehr, MD as Consulting Physician (Gastroenterology)    REASON FOR VISIT: Routine outpatient follow     Chief Complaint   Patient presents with    Hypertension    Follow-Up from Hospital     Pt states that he had cardiac cath and stent placement done one 10/3/18          HISTORY OF PRESENT ILLNESS:    History was obtained from the patient. Rubin Almeida is a 58 y.o. is here for Follow-up after recent hospitalization for angiogram and stent placement. He is feeling better. He's been having some chest pain on and off for a few months. A stress test last year was negative. Recent echo showed decrease in LV function to 35%. Angiogram showed 99% mid RCA stenosis and required a bare metal stent. He is currently on aspirin and Plavix and tolerating well. He has history of an deficiency anemia due to a gastric AVMs and bleeding. Currently hemoglobin has been stable and he is denying blood in his stools. He is following up with dermatology every 6 months. He had recent labs done. Patient says he quit smoking since hospitalization. He has COPD. He is using his inhalers. Today is denying any chest pains or shortness of breath. Has a history of prostate cancer. He was treated in 2009. He is not following up with urology apparently. He was going every year to the last couple of years. He has not had a PSA in some time. He agrees to flu vaccine today.       Echo 2018  CONCLUSIONS    Summary  Left ventricle is normal in size, global left ventricular systolic function  is moderately reduced with global hypokinesis (more in anterolateral wall). Estimated ejection fraction is 35%. Grade I (mild) left ventricular diastolic dysfunction. No significant valvular regurgitation or stenosis seen. Insignificant tricuspid regurgitation, unable to estimate RVSP    Coronary angiogram 2018   Conclusions      Procedure Summary      99% mid RCA stenosis reduced to 0% using 4x28 vision stent and post   dilated using 4 mm NC balloon.   Nonobstructive disease of other arteries.   BMS used due to large stent (4 mm) and history of gastritis.      Recommendations      Dual antiplatelet therapy and risk factor modifications      EGD  Findings[de-identified]   Esophagus: no gross lesions. Regular Z-line at 42cm. Stomach: Edematous nodular mucosa in gastric body- biopsies taken. No gross lesions in gastric antrum  Duodenum: no gross lesions noted in examined portion of duodenum.      Recommendations:   1. No evidence of active ongoing GI bleeding at this time . Monitor H/H: transfuse as needed. Maintain Hb% >8gm/dL  2. Pathology results to be followed  3. Avoid NSAIDs     Colonoscopy 2018  Findings:      1. A 3mm sessile polyp was found in the rectosigmoid colon- removed with biopsy forceps  2. Non-bleeding small internal hemorrhoids were found on retroflexion     Recommendations:   1. No e/o active ongoing lower GI bleeding at this time . Monitor H/H: transfuse as needed. 2. Pathology results to be followed            CTA chest  ediastinum: Vascular calcifications are noted.  There is no pathologic   adenopathy.  Small pericardial effusion is noted.  Hiatal hernia is noted. No pleural effusion is noted       Lungs/pleura: Emphysematous changes are noted bilaterally.  Apical scarring   on the right is again noted.  Multiple tiny right middle lobe nodules are   unchanged.  A few tiny right upper lobe nodules are unchanged as well.    Multiple tiny right lower lobe nodular densities are stable.  Left upper lobe   nodular density on image 80 is unchanged.  Multiple left lower lobe tiny   nodular densities are stable.  There is no definite new nodule.       Upper Abdomen: Limited evaluation in the upper abdomen demonstrates no focal   abnormality.       Soft Tissues/Bones: Degenerative changes in the spine are noted.  No   destructive bone lesions are noted.           Impression   Stable exam       Continued follow-up as clinically indicated         Past Medical History:   Diagnosis Date    Alcohol withdrawal seizure ([facility] 1991    quit ETOH since 1991    Blood loss anemia 2008    transfusion from chronic plavix and asa use    CAD (coronary artery disease) 2006    angioplast with drug eluting stent to l circumflex     Cardiomyopathy ([facility]     Chest pain     Chronic kidney disease     CKD (chronic kidney disease) 8/9/2016    COPD (chronic obstructive pulmonary disease) ([facility] 08/2012    severe obstructive disease with bronchospasm on PFT    Cough     \"smoker\"    Erectile dysfunction     History of transfusion of packed red blood cells 2009    s/p prostate surgery    Hyperlipidemia     Hypertension     Nicotine dependence     Primary adenocarcinoma of prostate ([facility] 2009    tucker stage 6 s/p lap prostatectomy    Urinary incontinence     Wears dentures     upper and lower    Wears glasses        Past Surgical History:   Procedure Laterality Date    CARDIAC CATHETERIZATION  2006    drug eluting stent to LCX STENT X1    CARDIAC CATHETERIZATION  10/2015    STENT WAS PATENT    CARDIAC CATHETERIZATION  10/03/2018    STENT X1    COLONOSCOPY  2008    int hemorrhoids, tiny sigmoid polyp    COLONOSCOPY  09/19/2017    Normal    COLONOSCOPY  1/23/2018    COLONOSCOPY WITH BIOPSY performed by [facility] Harden [facility] at [facility]    ENDOSCOPY, COLON, DIAGNOSTIC      INGUINAL HERNIA REPAIR Right     TX COLON CA SCRN NOT  [address]  IND N/A 9/19/2017    COLONOSCOPY performed by Caesar Khanna MD at [address] [place] [address] 3    docusate sodium (COLACE) 100 MG capsule Take 100 mg by mouth 2 times daily      acetaminophen (TYLENOL) 325 MG tablet Take 2 tablets by mouth every 4 hours as needed for Pain 20 tablet 0    albuterol sulfate HFA (PROAIR HFA) 108 (90 Base) MCG/ACT inhaler Inhale 2 puffs into the lungs every 6 hours as needed for Wheezing 1 Inhaler 3     No current facility-administered medications on file prior to visit. SOCIAL HISTORY    Reviewed and no change from previous record. Papi Nolen  reports that he has been smoking Cigarettes. He has a 25.00 pack-year smoking history. He has never used smokeless tobacco.    FAMILY HISTORY:    Reviewed and No change from previous visit    HEALTH MAINTENANCE DUE:      Health Maintenance Due   Topic Date Due    Flu vaccine (1) 09/01/2018       REVIEW OF SYSTEMS:    12 point review of symptoms completed and found to be normal except noted in the HPI    Review of Systems   Constitutional: Negative for chills, fatigue and fever. HENT: Positive for congestion. Negative for postnasal drip, rhinorrhea and sinus pain. Eyes: Negative for redness and visual disturbance. Respiratory: Positive for shortness of breath. Negative for cough and wheezing. Cardiovascular: Negative for chest pain, palpitations and leg swelling. Gastrointestinal: Positive for constipation. Negative for abdominal pain and blood in stool. Endocrine: Negative for polydipsia and polyuria. Genitourinary: Negative for dysuria, frequency and urgency. Allergic/Immunologic: Negative for environmental allergies and immunocompromised state. Neurological: Negative for dizziness, weakness and headaches. Hematological: Negative for adenopathy. Does not bruise/bleed easily. Psychiatric/Behavioral: Negative for dysphoric mood. The patient is not nervous/anxious.          PHYSICAL EXAM:     Vitals:    10/25/18 1343   BP: 109/83   Site: Right Upper Arm   Position: Sitting   Cuff Size: Small Adult   Pulse: 74

## 2018-10-25 NOTE — PROGRESS NOTES
Visit Information    Have you changed or started any medications since your last visit including any over-the-counter medicines, vitamins, or herbal medicines? no   Are you having any side effects from any of your medications? -  no  Have you stopped taking any of your medications? Is so, why? -  no    Have you seen any other physician or provider since your last visit? Yes - Records Obtained  Have you had any other diagnostic tests since your last visit? Yes - Records Obtained  Have you been seen in the emergency room and/or had an admission to a hospital since we last saw you? Yes - Records Obtained  Have you had your routine dental cleaning in the past 6 months? yes -     Have you activated your [facility] account? If not, what are your barriers?  No:      Patient Care Team:  Cris Pool MD as PCP - General (Internal Medicine)  Cris Pool MD as PCP - S Attributed Provider  LINO Winston - CNP as Nurse Practitioner (Cardiology)  Anna Holley RN as   Caesar Khanna MD as Consulting Physician (Gastroenterology)    Medical History Review  Past Medical, Family, and Social History reviewed and does contribute to the patient presenting condition    Health Maintenance   Topic Date Due    Flu vaccine (1) 09/01/2018    Hepatitis C screen  11/25/2018 (Originally 1955)    HIV screen  11/25/2018 (Originally 10/12/1969)    DTaP/Tdap/Td vaccine (1 - Tdap) 01/25/2019 (Originally 10/12/1973)    Shingles Vaccine (1 of 2 - 2 Dose Series) 01/25/2019 (Originally 10/12/2004)    A1C test (Diabetic or Prediabetic)  07/24/2019    Potassium monitoring  10/04/2019    Creatinine monitoring  10/04/2019    Lipid screen  01/23/2023    Colon cancer screen colonoscopy  01/23/2028    Pneumococcal med risk  Completed

## 2018-10-26 ENCOUNTER — HOSPITAL ENCOUNTER (OUTPATIENT)
Dept: CARDIAC REHAB | Age: 63
Setting detail: THERAPIES SERIES
Discharge: HOME | End: 2018-10-26
Payer: MEDICARE

## 2018-10-26 VITALS — BODY MASS INDEX: 19.49 KG/M2 | WEIGHT: 143.7 LBS

## 2018-10-26 PROCEDURE — 93798 PHYS/QHP OP CAR RHAB W/ECG: CPT

## 2018-10-27 ASSESSMENT — ENCOUNTER SYMPTOMS
EYE REDNESS: 0
SHORTNESS OF BREATH: 1
BLOOD IN STOOL: 0
WHEEZING: 0
SINUS PAIN: 0
RHINORRHEA: 0
CONSTIPATION: 1
ABDOMINAL PAIN: 0
COUGH: 0

## 2018-10-29 ENCOUNTER — HOSPITAL ENCOUNTER (OUTPATIENT)
Dept: CARDIAC REHAB | Age: 63
Setting detail: THERAPIES SERIES
Discharge: HOME | End: 2018-10-29
Payer: MEDICARE

## 2018-10-29 VITALS — BODY MASS INDEX: 19.27 KG/M2 | WEIGHT: 142.1 LBS

## 2018-10-29 PROCEDURE — 93798 PHYS/QHP OP CAR RHAB W/ECG: CPT

## 2018-10-30 ENCOUNTER — OFFICE VISIT (OUTPATIENT)
Dept: GASTROENTEROLOGY | Age: 63
End: 2018-10-30
Payer: MEDICARE

## 2018-10-30 VITALS
SYSTOLIC BLOOD PRESSURE: 101 MMHG | DIASTOLIC BLOOD PRESSURE: 63 MMHG | BODY MASS INDEX: 19.15 KG/M2 | HEART RATE: 76 BPM | WEIGHT: 141.2 LBS

## 2018-10-30 DIAGNOSIS — D50.0 IRON DEFICIENCY ANEMIA DUE TO CHRONIC BLOOD LOSS: Primary | ICD-10-CM

## 2018-10-30 PROCEDURE — 99213 OFFICE O/P EST LOW 20 MIN: CPT | Performed by: INTERNAL MEDICINE

## 2018-10-30 PROCEDURE — 1036F TOBACCO NON-USER: CPT | Performed by: INTERNAL MEDICINE

## 2018-10-30 PROCEDURE — G8482 FLU IMMUNIZE ORDER/ADMIN: HCPCS | Performed by: INTERNAL MEDICINE

## 2018-10-30 PROCEDURE — 3017F COLORECTAL CA SCREEN DOC REV: CPT | Performed by: INTERNAL MEDICINE

## 2018-10-30 PROCEDURE — G8420 CALC BMI NORM PARAMETERS: HCPCS | Performed by: INTERNAL MEDICINE

## 2018-10-30 PROCEDURE — G8598 ASA/ANTIPLAT THER USED: HCPCS | Performed by: INTERNAL MEDICINE

## 2018-10-30 PROCEDURE — G8427 DOCREV CUR MEDS BY ELIG CLIN: HCPCS | Performed by: INTERNAL MEDICINE

## 2018-10-30 ASSESSMENT — ENCOUNTER SYMPTOMS
ABDOMINAL DISTENTION: 0
BACK PAIN: 0
CONSTIPATION: 0
VOMITING: 0
ANAL BLEEDING: 0
BLOOD IN STOOL: 0
SINUS PAIN: 0
SINUS PRESSURE: 0
DIARRHEA: 0
NAUSEA: 0
RECTAL PAIN: 0
CHEST TIGHTNESS: 0
COUGH: 0
ABDOMINAL PAIN: 0
SHORTNESS OF BREATH: 0
TROUBLE SWALLOWING: 0
SORE THROAT: 0

## 2018-10-30 NOTE — PROGRESS NOTES
Gastrointestinal: Negative for abdominal distention, abdominal pain, anal bleeding, blood in stool, constipation, diarrhea, nausea, rectal pain and vomiting. Endocrine: Negative. Genitourinary: Negative for difficulty urinating. Musculoskeletal: Negative for arthralgias, back pain and gait problem. Skin: Negative. Allergic/Immunologic: Negative for environmental allergies and food allergies. Neurological: Negative for dizziness, weakness, light-headedness and headaches. Hematological: Does not bruise/bleed easily. Psychiatric/Behavioral: Negative for sleep disturbance. The patient is not nervous/anxious. PHYSICAL EXAMINATION: Vital signs reviewed per the nursing documentation. /63 (Site: Left Upper Arm, Position: Sitting, Cuff Size: Medium Adult)   Pulse 76   Wt 141 lb 3.2 oz (64 kg)   BMI 19.15 kg/m²   Body mass index is 19.15 kg/m². I personally reviewed the nurse's notes and documentation and I agree with her notes. General: alert, appears stated age and cooperative Psych: Normal. and Alert and oriented, appropriate affect. . Normal affect. Mentation normal  HEENT: PERRLA. Clear conjunctivae and sclerae. Moist oral mucosae, no lesions or ulcers. The neck is supple, without lymphadenopathy or jugular venous distension. No masses. Normal thyroid. Cardiovascular: S1 S2 RRR no rubs or murmurs. Pulmonary: clear BL. No accessory muscle usage. Abdominal Exam: Soft, NT ND, no hepato or spleno megaly, +BS, no ascites.       LABORATORY DATA: Reviewed  Lab Results   Component Value Date    WBC 4.6 10/09/2018    HGB 13.5 10/09/2018    HCT 40.7 (L) 10/09/2018    MCV 89.6 10/09/2018     10/09/2018     10/04/2018    K 4.0 10/04/2018     10/04/2018    CO2 23 10/04/2018    BUN 7 (L) 10/04/2018    CREATININE 1.08 10/04/2018    LABALBU 3.5 01/23/2018    BILITOT 0.58 01/23/2018    ALKPHOS 53 01/23/2018    AST 13 01/23/2018    ALT 7 01/23/2018         Lab Results

## 2018-10-31 ENCOUNTER — HOSPITAL ENCOUNTER (OUTPATIENT)
Dept: CARDIAC REHAB | Age: 63
Setting detail: THERAPIES SERIES
Discharge: HOME | End: 2018-10-31
Payer: MEDICARE

## 2018-10-31 VITALS — WEIGHT: 141.9 LBS | BODY MASS INDEX: 19.25 KG/M2

## 2018-10-31 PROCEDURE — 93798 PHYS/QHP OP CAR RHAB W/ECG: CPT

## 2018-11-02 ENCOUNTER — HOSPITAL ENCOUNTER (OUTPATIENT)
Dept: CARDIAC REHAB | Age: 63
Setting detail: THERAPIES SERIES
Discharge: HOME | End: 2018-11-02
Payer: MEDICARE

## 2018-11-02 VITALS — BODY MASS INDEX: 19.26 KG/M2 | WEIGHT: 142 LBS

## 2018-11-02 PROCEDURE — 93798 PHYS/QHP OP CAR RHAB W/ECG: CPT

## 2018-11-05 ENCOUNTER — HOSPITAL ENCOUNTER (OUTPATIENT)
Dept: CARDIAC REHAB | Age: 63
Setting detail: THERAPIES SERIES
Discharge: HOME | End: 2018-11-05
Payer: MEDICARE

## 2018-11-05 VITALS — BODY MASS INDEX: 19.76 KG/M2 | WEIGHT: 145.7 LBS

## 2018-11-05 PROCEDURE — 93798 PHYS/QHP OP CAR RHAB W/ECG: CPT

## 2018-11-07 ENCOUNTER — HOSPITAL ENCOUNTER (OUTPATIENT)
Dept: CARDIAC REHAB | Age: 63
Setting detail: THERAPIES SERIES
Discharge: HOME | End: 2018-11-07
Payer: MEDICARE

## 2018-11-07 VITALS — WEIGHT: 144.6 LBS | BODY MASS INDEX: 19.61 KG/M2

## 2018-11-07 PROCEDURE — 93798 PHYS/QHP OP CAR RHAB W/ECG: CPT

## 2018-11-09 ENCOUNTER — HOSPITAL ENCOUNTER (OUTPATIENT)
Dept: CARDIAC REHAB | Age: 63
Setting detail: THERAPIES SERIES
Discharge: HOME | End: 2018-11-09
Payer: MEDICARE

## 2018-11-09 VITALS — WEIGHT: 144.7 LBS | BODY MASS INDEX: 19.62 KG/M2

## 2018-11-09 PROCEDURE — 93798 PHYS/QHP OP CAR RHAB W/ECG: CPT

## 2018-11-12 ENCOUNTER — HOSPITAL ENCOUNTER (OUTPATIENT)
Dept: CARDIAC REHAB | Age: 63
Setting detail: THERAPIES SERIES
Discharge: HOME | End: 2018-11-12
Payer: MEDICARE

## 2018-11-14 ENCOUNTER — HOSPITAL ENCOUNTER (OUTPATIENT)
Dept: CARDIAC REHAB | Age: 63
Setting detail: THERAPIES SERIES
Discharge: HOME | End: 2018-11-14
Payer: MEDICARE

## 2018-11-14 VITALS — WEIGHT: 144.7 LBS | BODY MASS INDEX: 19.62 KG/M2

## 2018-11-14 PROCEDURE — 93798 PHYS/QHP OP CAR RHAB W/ECG: CPT

## 2018-11-16 ENCOUNTER — HOSPITAL ENCOUNTER (OUTPATIENT)
Dept: CARDIAC REHAB | Age: 63
Setting detail: THERAPIES SERIES
Discharge: HOME | End: 2018-11-16
Payer: MEDICARE

## 2018-11-16 VITALS — BODY MASS INDEX: 19.61 KG/M2 | WEIGHT: 144.6 LBS

## 2018-11-16 PROCEDURE — 93798 PHYS/QHP OP CAR RHAB W/ECG: CPT

## 2018-11-19 ENCOUNTER — HOSPITAL ENCOUNTER (OUTPATIENT)
Dept: CARDIAC REHAB | Age: 63
Setting detail: THERAPIES SERIES
Discharge: HOME | End: 2018-11-19
Payer: MEDICARE

## 2018-11-19 VITALS — BODY MASS INDEX: 19.64 KG/M2 | WEIGHT: 144.8 LBS

## 2018-11-19 PROCEDURE — 93798 PHYS/QHP OP CAR RHAB W/ECG: CPT

## 2018-11-21 ENCOUNTER — HOSPITAL ENCOUNTER (OUTPATIENT)
Dept: CARDIAC REHAB | Age: 63
Setting detail: THERAPIES SERIES
Discharge: HOME | End: 2018-11-21
Payer: MEDICARE

## 2018-11-21 VITALS — BODY MASS INDEX: 19.8 KG/M2 | WEIGHT: 146 LBS

## 2018-11-21 PROCEDURE — 93798 PHYS/QHP OP CAR RHAB W/ECG: CPT

## 2018-11-23 ENCOUNTER — APPOINTMENT (OUTPATIENT)
Dept: CARDIAC REHAB | Age: 63
End: 2018-11-23
Payer: MEDICARE

## 2018-11-26 ENCOUNTER — HOSPITAL ENCOUNTER (OUTPATIENT)
Dept: CARDIAC REHAB | Age: 63
Setting detail: THERAPIES SERIES
Discharge: HOME | End: 2018-11-26
Payer: MEDICARE

## 2018-11-26 VITALS — WEIGHT: 147.6 LBS | HEIGHT: 72 IN | BODY MASS INDEX: 19.99 KG/M2

## 2018-11-26 PROCEDURE — 93798 PHYS/QHP OP CAR RHAB W/ECG: CPT

## 2018-11-28 ENCOUNTER — HOSPITAL ENCOUNTER (OUTPATIENT)
Dept: CARDIAC REHAB | Age: 63
Setting detail: THERAPIES SERIES
Discharge: HOME | End: 2018-11-28
Payer: MEDICARE

## 2018-11-28 VITALS — BODY MASS INDEX: 20.15 KG/M2 | WEIGHT: 148.6 LBS

## 2018-11-28 PROCEDURE — 93798 PHYS/QHP OP CAR RHAB W/ECG: CPT

## 2018-11-30 ENCOUNTER — HOSPITAL ENCOUNTER (OUTPATIENT)
Dept: CARDIAC REHAB | Age: 63
Setting detail: THERAPIES SERIES
Discharge: HOME | End: 2018-11-30
Payer: MEDICARE

## 2018-11-30 VITALS — WEIGHT: 146.9 LBS | BODY MASS INDEX: 19.92 KG/M2

## 2018-11-30 PROCEDURE — 93798 PHYS/QHP OP CAR RHAB W/ECG: CPT

## 2018-12-03 ENCOUNTER — HOSPITAL ENCOUNTER (OUTPATIENT)
Dept: CARDIAC REHAB | Age: 63
Setting detail: THERAPIES SERIES
Discharge: HOME | End: 2018-12-03
Payer: MEDICARE

## 2018-12-03 VITALS — WEIGHT: 146.8 LBS | BODY MASS INDEX: 19.91 KG/M2

## 2018-12-03 PROCEDURE — 93798 PHYS/QHP OP CAR RHAB W/ECG: CPT

## 2018-12-05 ENCOUNTER — HOSPITAL ENCOUNTER (OUTPATIENT)
Dept: CARDIAC REHAB | Age: 63
Setting detail: THERAPIES SERIES
Discharge: HOME | End: 2018-12-05
Payer: MEDICARE

## 2018-12-05 VITALS — BODY MASS INDEX: 19.75 KG/M2 | WEIGHT: 145.6 LBS

## 2018-12-05 PROCEDURE — 93798 PHYS/QHP OP CAR RHAB W/ECG: CPT

## 2018-12-07 ENCOUNTER — HOSPITAL ENCOUNTER (OUTPATIENT)
Dept: CARDIAC REHAB | Age: 63
Setting detail: THERAPIES SERIES
Discharge: HOME | End: 2018-12-07
Payer: MEDICARE

## 2018-12-10 ENCOUNTER — HOSPITAL ENCOUNTER (OUTPATIENT)
Dept: CARDIAC REHAB | Age: 63
Setting detail: THERAPIES SERIES
Discharge: HOME | End: 2018-12-10
Payer: MEDICARE

## 2018-12-10 VITALS — BODY MASS INDEX: 19.91 KG/M2 | WEIGHT: 146.8 LBS

## 2018-12-10 PROCEDURE — 93798 PHYS/QHP OP CAR RHAB W/ECG: CPT

## 2018-12-12 ENCOUNTER — HOSPITAL ENCOUNTER (OUTPATIENT)
Dept: CARDIAC REHAB | Age: 63
Setting detail: THERAPIES SERIES
Discharge: HOME | End: 2018-12-12
Payer: MEDICARE

## 2018-12-12 VITALS — WEIGHT: 147 LBS | BODY MASS INDEX: 19.94 KG/M2

## 2018-12-12 PROCEDURE — 93798 PHYS/QHP OP CAR RHAB W/ECG: CPT

## 2018-12-12 NOTE — PROGRESS NOTES
NUTRITION NOTE-CARDIAC REHABILITATION    Pt attended Cardiac Rehabilitation Nutrition Class. Handouts provided. Pt appeared receptive to information provided. Name and Office phone number provided for reference. Erskin Buerger, R.D., LINDIRA.   Pager: [phone]

## 2018-12-14 ENCOUNTER — HOSPITAL ENCOUNTER (OUTPATIENT)
Dept: CARDIAC REHAB | Age: 63
Setting detail: THERAPIES SERIES
Discharge: HOME | End: 2018-12-14
Payer: MEDICARE

## 2018-12-14 VITALS — BODY MASS INDEX: 19.99 KG/M2 | WEIGHT: 147.4 LBS

## 2018-12-14 PROCEDURE — 93798 PHYS/QHP OP CAR RHAB W/ECG: CPT

## 2018-12-17 ENCOUNTER — HOSPITAL ENCOUNTER (OUTPATIENT)
Dept: CARDIAC REHAB | Age: 63
Setting detail: THERAPIES SERIES
Discharge: HOME | End: 2018-12-17
Payer: MEDICARE

## 2018-12-17 VITALS — BODY MASS INDEX: 20 KG/M2 | WEIGHT: 147.5 LBS

## 2018-12-17 PROCEDURE — 93798 PHYS/QHP OP CAR RHAB W/ECG: CPT

## 2018-12-19 ENCOUNTER — HOSPITAL ENCOUNTER (OUTPATIENT)
Dept: CARDIAC REHAB | Age: 63
Setting detail: THERAPIES SERIES
Discharge: HOME | End: 2018-12-19
Payer: MEDICARE

## 2018-12-19 VITALS — WEIGHT: 148.6 LBS | BODY MASS INDEX: 20.15 KG/M2

## 2018-12-19 PROCEDURE — 93798 PHYS/QHP OP CAR RHAB W/ECG: CPT

## 2018-12-21 ENCOUNTER — HOSPITAL ENCOUNTER (OUTPATIENT)
Dept: CARDIAC REHAB | Age: 63
Setting detail: THERAPIES SERIES
Discharge: HOME | End: 2018-12-21
Payer: MEDICARE

## 2018-12-21 VITALS — WEIGHT: 149.6 LBS | BODY MASS INDEX: 20.29 KG/M2

## 2018-12-21 PROCEDURE — 93798 PHYS/QHP OP CAR RHAB W/ECG: CPT

## 2018-12-28 ENCOUNTER — HOSPITAL ENCOUNTER (OUTPATIENT)
Dept: CARDIAC REHAB | Age: 63
Setting detail: THERAPIES SERIES
Discharge: HOME | End: 2018-12-28
Payer: MEDICARE

## 2018-12-28 VITALS — WEIGHT: 148 LBS | BODY MASS INDEX: 20.05 KG/M2 | HEIGHT: 72 IN

## 2018-12-28 PROCEDURE — 93798 PHYS/QHP OP CAR RHAB W/ECG: CPT

## 2018-12-31 ENCOUNTER — APPOINTMENT (OUTPATIENT)
Dept: CARDIAC REHAB | Age: 63
End: 2018-12-31
Payer: MEDICARE

## 2019-01-02 ENCOUNTER — HOSPITAL ENCOUNTER (OUTPATIENT)
Dept: CARDIAC REHAB | Age: 64
Setting detail: THERAPIES SERIES
Discharge: HOME | End: 2019-01-02
Payer: MEDICARE

## 2019-01-04 ENCOUNTER — HOSPITAL ENCOUNTER (OUTPATIENT)
Dept: CARDIAC REHAB | Age: 64
Setting detail: THERAPIES SERIES
Discharge: HOME | End: 2019-01-04
Payer: MEDICARE

## 2019-01-04 VITALS — WEIGHT: 147.8 LBS | BODY MASS INDEX: 20.05 KG/M2

## 2019-01-04 PROCEDURE — 93798 PHYS/QHP OP CAR RHAB W/ECG: CPT

## 2019-01-07 ENCOUNTER — HOSPITAL ENCOUNTER (OUTPATIENT)
Dept: CARDIAC REHAB | Age: 64
Setting detail: THERAPIES SERIES
Discharge: HOME | End: 2019-01-07
Payer: MEDICARE

## 2019-01-07 VITALS — BODY MASS INDEX: 20.07 KG/M2 | WEIGHT: 148 LBS

## 2019-01-07 PROCEDURE — 93798 PHYS/QHP OP CAR RHAB W/ECG: CPT

## 2019-01-09 ENCOUNTER — HOSPITAL ENCOUNTER (OUTPATIENT)
Dept: CARDIAC REHAB | Age: 64
Setting detail: THERAPIES SERIES
Discharge: HOME | End: 2019-01-09
Payer: MEDICARE

## 2019-01-09 VITALS — BODY MASS INDEX: 20.18 KG/M2 | WEIGHT: 148.8 LBS

## 2019-01-09 PROCEDURE — 93798 PHYS/QHP OP CAR RHAB W/ECG: CPT

## 2019-01-11 ENCOUNTER — HOSPITAL ENCOUNTER (OUTPATIENT)
Dept: CARDIAC REHAB | Age: 64
Setting detail: THERAPIES SERIES
Discharge: HOME | End: 2019-01-11
Payer: MEDICARE

## 2019-01-11 VITALS — BODY MASS INDEX: 20.29 KG/M2 | WEIGHT: 149.6 LBS

## 2019-01-11 PROCEDURE — 93798 PHYS/QHP OP CAR RHAB W/ECG: CPT

## 2019-01-14 ENCOUNTER — HOSPITAL ENCOUNTER (OUTPATIENT)
Dept: CARDIAC REHAB | Age: 64
Setting detail: THERAPIES SERIES
Discharge: HOME | End: 2019-01-14
Payer: MEDICARE

## 2019-01-14 VITALS — WEIGHT: 149.5 LBS | BODY MASS INDEX: 20.28 KG/M2

## 2019-01-14 PROCEDURE — 93798 PHYS/QHP OP CAR RHAB W/ECG: CPT

## 2019-01-16 ENCOUNTER — HOSPITAL ENCOUNTER (OUTPATIENT)
Dept: CARDIAC REHAB | Age: 64
Setting detail: THERAPIES SERIES
Discharge: HOME | End: 2019-01-16
Payer: MEDICARE

## 2019-01-16 VITALS — WEIGHT: 151.6 LBS | BODY MASS INDEX: 20.56 KG/M2

## 2019-01-16 PROCEDURE — 93798 PHYS/QHP OP CAR RHAB W/ECG: CPT

## 2019-01-18 ENCOUNTER — HOSPITAL ENCOUNTER (OUTPATIENT)
Dept: CARDIAC REHAB | Age: 64
Setting detail: THERAPIES SERIES
Discharge: HOME | End: 2019-01-18
Payer: MEDICARE

## 2019-01-21 ENCOUNTER — HOSPITAL ENCOUNTER (OUTPATIENT)
Dept: CARDIAC REHAB | Age: 64
Setting detail: THERAPIES SERIES
Discharge: HOME | End: 2019-01-21
Payer: MEDICARE

## 2019-01-23 ENCOUNTER — HOSPITAL ENCOUNTER (OUTPATIENT)
Dept: CARDIAC REHAB | Age: 64
Setting detail: THERAPIES SERIES
Discharge: HOME | End: 2019-01-23
Payer: MEDICARE

## 2019-01-25 ENCOUNTER — HOSPITAL ENCOUNTER (OUTPATIENT)
Dept: CARDIAC REHAB | Age: 64
Setting detail: THERAPIES SERIES
Discharge: HOME | End: 2019-01-25
Payer: MEDICARE

## 2019-01-25 VITALS — WEIGHT: 153.4 LBS | BODY MASS INDEX: 20.8 KG/M2

## 2019-01-25 PROCEDURE — 93798 PHYS/QHP OP CAR RHAB W/ECG: CPT

## 2019-01-28 ENCOUNTER — HOSPITAL ENCOUNTER (OUTPATIENT)
Dept: CARDIAC REHAB | Age: 64
Setting detail: THERAPIES SERIES
Discharge: HOME | End: 2019-01-28
Payer: MEDICARE

## 2019-01-30 ENCOUNTER — APPOINTMENT (OUTPATIENT)
Dept: CARDIAC REHAB | Age: 64
End: 2019-01-30
Payer: MEDICARE

## 2019-02-01 ENCOUNTER — HOSPITAL ENCOUNTER (OUTPATIENT)
Dept: CARDIAC REHAB | Age: 64
Setting detail: THERAPIES SERIES
Discharge: HOME | End: 2019-02-01
Payer: MEDICARE

## 2019-02-03 DIAGNOSIS — Z98.61 CAD S/P PERCUTANEOUS CORONARY ANGIOPLASTY: ICD-10-CM

## 2019-02-03 DIAGNOSIS — I10 ESSENTIAL HYPERTENSION: ICD-10-CM

## 2019-02-03 DIAGNOSIS — I25.10 CAD S/P PERCUTANEOUS CORONARY ANGIOPLASTY: ICD-10-CM

## 2019-02-03 DIAGNOSIS — I25.5 ISCHEMIC CARDIOMYOPATHY: ICD-10-CM

## 2019-02-04 ENCOUNTER — HOSPITAL ENCOUNTER (OUTPATIENT)
Dept: CARDIAC REHAB | Age: 64
Setting detail: THERAPIES SERIES
Discharge: HOME | End: 2019-02-04
Payer: MEDICARE

## 2019-02-04 VITALS — WEIGHT: 154.8 LBS | BODY MASS INDEX: 20.99 KG/M2

## 2019-02-04 PROCEDURE — 93798 PHYS/QHP OP CAR RHAB W/ECG: CPT

## 2019-02-06 ENCOUNTER — HOSPITAL ENCOUNTER (OUTPATIENT)
Dept: CARDIAC REHAB | Age: 64
Setting detail: THERAPIES SERIES
Discharge: HOME | End: 2019-02-06
Payer: MEDICARE

## 2019-02-06 VITALS — HEIGHT: 72 IN | WEIGHT: 154.5 LBS | BODY MASS INDEX: 20.93 KG/M2

## 2019-02-06 PROCEDURE — 93798 PHYS/QHP OP CAR RHAB W/ECG: CPT

## 2019-02-06 ASSESSMENT — PATIENT HEALTH QUESTIONNAIRE - PHQ9
SUM OF ALL RESPONSES TO PHQ QUESTIONS 1-9: 0
SUM OF ALL RESPONSES TO PHQ QUESTIONS 1-9: 0

## 2019-02-08 ENCOUNTER — HOSPITAL ENCOUNTER (OUTPATIENT)
Dept: CARDIAC REHAB | Age: 64
Setting detail: THERAPIES SERIES
Discharge: HOME | End: 2019-02-08
Payer: MEDICARE

## 2019-02-08 VITALS — WEIGHT: 154.4 LBS | BODY MASS INDEX: 20.94 KG/M2

## 2019-02-08 PROCEDURE — 93798 PHYS/QHP OP CAR RHAB W/ECG: CPT

## 2019-02-11 ENCOUNTER — HOSPITAL ENCOUNTER (OUTPATIENT)
Dept: CARDIAC REHAB | Age: 64
Setting detail: THERAPIES SERIES
Discharge: HOME | End: 2019-02-11
Payer: MEDICARE

## 2019-02-13 ENCOUNTER — HOSPITAL ENCOUNTER (OUTPATIENT)
Dept: CARDIAC REHAB | Age: 64
Setting detail: THERAPIES SERIES
Discharge: HOME | End: 2019-02-13
Payer: MEDICARE

## 2019-02-15 ENCOUNTER — APPOINTMENT (OUTPATIENT)
Dept: CARDIAC REHAB | Age: 64
End: 2019-02-15
Payer: MEDICARE

## 2019-02-18 ENCOUNTER — HOSPITAL ENCOUNTER (OUTPATIENT)
Dept: CARDIAC REHAB | Age: 64
Setting detail: THERAPIES SERIES
Discharge: HOME | End: 2019-02-18
Payer: MEDICARE

## 2019-02-18 VITALS — WEIGHT: 156.6 LBS | BODY MASS INDEX: 21.24 KG/M2

## 2019-02-18 PROCEDURE — 93798 PHYS/QHP OP CAR RHAB W/ECG: CPT

## 2019-02-18 RX ORDER — RANOLAZINE 500 MG/1
500 TABLET, EXTENDED RELEASE ORAL 2 TIMES DAILY
COMMUNITY

## 2019-02-20 ENCOUNTER — HOSPITAL ENCOUNTER (OUTPATIENT)
Dept: CARDIAC REHAB | Age: 64
Setting detail: THERAPIES SERIES
Discharge: HOME | End: 2019-02-20
Payer: MEDICARE

## 2019-02-22 ENCOUNTER — HOSPITAL ENCOUNTER (OUTPATIENT)
Dept: CARDIAC REHAB | Age: 64
Setting detail: THERAPIES SERIES
Discharge: HOME | End: 2019-02-22
Payer: MEDICARE

## 2019-02-22 VITALS — BODY MASS INDEX: 20.82 KG/M2 | WEIGHT: 153.5 LBS

## 2019-02-22 PROCEDURE — 93798 PHYS/QHP OP CAR RHAB W/ECG: CPT

## 2019-02-27 ENCOUNTER — TELEPHONE (OUTPATIENT)
Dept: INTERNAL MEDICINE | Age: 64
End: 2019-02-27

## 2019-02-27 DIAGNOSIS — I25.119 CORONARY ARTERY DISEASE INVOLVING NATIVE CORONARY ARTERY OF NATIVE HEART WITH ANGINA PECTORIS (HCC): Primary | ICD-10-CM

## 2019-03-01 RX ORDER — ATORVASTATIN CALCIUM 20 MG/1
20 TABLET, FILM COATED ORAL DAILY
Qty: 90 TABLET | Refills: 6 | Status: SHIPPED | OUTPATIENT
Start: 2019-03-01 | End: 2020-05-04 | Stop reason: SDUPTHER

## 2019-03-07 ENCOUNTER — OFFICE VISIT (OUTPATIENT)
Dept: INTERNAL MEDICINE | Age: 64
End: 2019-03-07
Payer: MEDICARE

## 2019-03-07 VITALS
DIASTOLIC BLOOD PRESSURE: 72 MMHG | BODY MASS INDEX: 21.13 KG/M2 | WEIGHT: 155.8 LBS | HEART RATE: 77 BPM | SYSTOLIC BLOOD PRESSURE: 103 MMHG

## 2019-03-07 DIAGNOSIS — E78.00 PURE HYPERCHOLESTEROLEMIA: ICD-10-CM

## 2019-03-07 DIAGNOSIS — F10.20 ALCOHOLIC (HCC): ICD-10-CM

## 2019-03-07 DIAGNOSIS — R73.03 PREDIABETES: ICD-10-CM

## 2019-03-07 DIAGNOSIS — D50.0 IRON DEFICIENCY ANEMIA DUE TO CHRONIC BLOOD LOSS: ICD-10-CM

## 2019-03-07 DIAGNOSIS — I25.5 ISCHEMIC CARDIOMYOPATHY: ICD-10-CM

## 2019-03-07 DIAGNOSIS — I10 ESSENTIAL HYPERTENSION: ICD-10-CM

## 2019-03-07 DIAGNOSIS — N18.30 STAGE 3 CHRONIC KIDNEY DISEASE (HCC): ICD-10-CM

## 2019-03-07 DIAGNOSIS — J44.9 MODERATE COPD (CHRONIC OBSTRUCTIVE PULMONARY DISEASE) (HCC): Primary | ICD-10-CM

## 2019-03-07 DIAGNOSIS — I25.10 CORONARY ARTERY DISEASE INVOLVING NATIVE CORONARY ARTERY OF NATIVE HEART WITHOUT ANGINA PECTORIS: ICD-10-CM

## 2019-03-07 PROCEDURE — 99211 OFF/OP EST MAY X REQ PHY/QHP: CPT | Performed by: INTERNAL MEDICINE

## 2019-03-07 PROCEDURE — 3023F SPIROM DOC REV: CPT | Performed by: INTERNAL MEDICINE

## 2019-03-07 PROCEDURE — 99214 OFFICE O/P EST MOD 30 MIN: CPT | Performed by: INTERNAL MEDICINE

## 2019-03-07 PROCEDURE — G8482 FLU IMMUNIZE ORDER/ADMIN: HCPCS | Performed by: INTERNAL MEDICINE

## 2019-03-07 PROCEDURE — G8926 SPIRO NO PERF OR DOC: HCPCS | Performed by: INTERNAL MEDICINE

## 2019-03-07 PROCEDURE — 3017F COLORECTAL CA SCREEN DOC REV: CPT | Performed by: INTERNAL MEDICINE

## 2019-03-07 PROCEDURE — G8427 DOCREV CUR MEDS BY ELIG CLIN: HCPCS | Performed by: INTERNAL MEDICINE

## 2019-03-07 PROCEDURE — 1036F TOBACCO NON-USER: CPT | Performed by: INTERNAL MEDICINE

## 2019-03-07 PROCEDURE — G8420 CALC BMI NORM PARAMETERS: HCPCS | Performed by: INTERNAL MEDICINE

## 2019-03-07 PROCEDURE — G8598 ASA/ANTIPLAT THER USED: HCPCS | Performed by: INTERNAL MEDICINE

## 2019-03-07 RX ORDER — AMLODIPINE BESYLATE 2.5 MG/1
2.5 TABLET ORAL DAILY
Qty: 90 TABLET | Refills: 2 | Status: SHIPPED | OUTPATIENT
Start: 2019-03-07 | End: 2019-11-22 | Stop reason: SDUPTHER

## 2019-03-07 RX ORDER — ALBUTEROL SULFATE 90 UG/1
2 AEROSOL, METERED RESPIRATORY (INHALATION) EVERY 6 HOURS PRN
Qty: 1 INHALER | Refills: 3 | Status: SHIPPED | OUTPATIENT
Start: 2019-03-07 | End: 2019-06-13 | Stop reason: SDUPTHER

## 2019-03-07 RX ORDER — BUDESONIDE AND FORMOTEROL FUMARATE DIHYDRATE 80; 4.5 UG/1; UG/1
2 AEROSOL RESPIRATORY (INHALATION) 2 TIMES DAILY
Qty: 2 INHALER | Refills: 5 | Status: SHIPPED | OUTPATIENT
Start: 2019-03-07 | End: 2019-06-13 | Stop reason: SDUPTHER

## 2019-03-07 ASSESSMENT — PATIENT HEALTH QUESTIONNAIRE - PHQ9
2. FEELING DOWN, DEPRESSED OR HOPELESS: 0
SUM OF ALL RESPONSES TO PHQ QUESTIONS 1-9: 1
SUM OF ALL RESPONSES TO PHQ QUESTIONS 1-9: 1
SUM OF ALL RESPONSES TO PHQ9 QUESTIONS 1 & 2: 1
1. LITTLE INTEREST OR PLEASURE IN DOING THINGS: 1

## 2019-04-04 NOTE — TELEPHONE ENCOUNTER
Escribe request for Pepcid . Please escribe if appropriate      Next Visit Date:  6/13/2019     Health Maintenance   Topic Date Due    Hepatitis C screen  1955    HIV screen  10/12/1969    DTaP/Tdap/Td vaccine (1 - Tdap) 10/12/1973    Shingles Vaccine (1 of 2) 10/12/2004    A1C test (Diabetic or Prediabetic)  07/24/2019    Lipid screen  01/23/2023    Colon cancer screen colonoscopy  01/23/2028    Flu vaccine  Completed    Pneumococcal 0-64 years at Risk Vaccine  Completed       Hemoglobin A1C (%)   Date Value   07/24/2018 5.9   01/30/2018 5.0   03/15/2017 5.9             ( goal A1C is < 7)   Microalb/Crt. Ratio (mcg/mg creat)   Date Value   07/27/2018 CANNOT BE CALCULATED     LDL Cholesterol (mg/dL)   Date Value   01/23/2018 54       (goal LDL is <100)   AST (U/L)   Date Value   01/23/2018 13     ALT (U/L)   Date Value   01/23/2018 7     BUN (mg/dL)   Date Value   10/04/2018 7 (L)     BP Readings from Last 3 Encounters:   03/07/19 103/72   10/30/18 101/63   10/25/18 109/83          (goal 120/80)          Patient Active Problem List:     COPD, moderate     HTN (hypertension)     Hyperlipemia     Smoker     mild Cardiomyopathy-ischemic ejection fraction 40-45%      Coronary artery disease involving native coronary artery of native heart with angina pectoris ([facility] [address]     recovered Alcoholic quit in 2991     Closed head injury with right temporal parenchymal bleed in 99. Prostate cancer adenocarcinoma. [place] stage 6 staus post laparoscopic prostatectomy 2009.      History of right  inguinal hernia repair     Transfusion history--2008 and 2009      COPD (chronic obstructive pulmonary disease) (HCC)     Erectile dysfunction     Personal history of malignant neoplasm of prostate     Hyperlipidemia     Alcohol dependence in remission ([address]     CKD (chronic kidney disease)     Prediabetes     History of colon polyps     Anemia     Iron deficiency anemia due to chronic blood loss     CAD in native artery

## 2019-04-05 DIAGNOSIS — D50.0 IRON DEFICIENCY ANEMIA DUE TO CHRONIC BLOOD LOSS: Primary | ICD-10-CM

## 2019-04-06 RX ORDER — FAMOTIDINE 20 MG/1
TABLET, FILM COATED ORAL
Qty: 60 TABLET | Refills: 0 | Status: SHIPPED | OUTPATIENT
Start: 2019-04-06 | End: 2019-04-07 | Stop reason: SDUPTHER

## 2019-04-08 RX ORDER — FAMOTIDINE 20 MG/1
TABLET, FILM COATED ORAL
Qty: 180 TABLET | Refills: 0 | Status: SHIPPED | OUTPATIENT
Start: 2019-04-08 | End: 2019-06-13

## 2019-04-08 NOTE — TELEPHONE ENCOUNTER
Escribe request for famotidine    Next Visit Date:  Future Appointments   Date Time Provider Nando Stallings   4/9/2019 11:00 AM SCHEDULE, [facility]   4/16/2019  1:40 PM Lucy Kimbrough MD [facility]   4/29/2019  3:30 PM Jori Dickens MD [facility]   6/13/2019  1:30 PM Lelia Isabel MD [facility] IM [address] Maintenance   Topic Date Due    Hepatitis C screen  1955    HIV screen  10/12/1969    DTaP/Tdap/Td vaccine (1 - Tdap) 10/12/1973    Shingles Vaccine (1 of 2) 10/12/2004    A1C test (Diabetic or Prediabetic)  07/24/2019    Lipid screen  01/23/2023    Colon cancer screen colonoscopy  01/23/2028    Flu vaccine  Completed    Pneumococcal 0-64 years at Risk Vaccine  Completed             (applicable per patient's age: Cancer Screenings, Depression Screening, Fall Risk Screening, Immunizations)    Hemoglobin A1C (%)   Date Value   07/24/2018 5.9   01/30/2018 5.0   03/15/2017 5.9     Microalb/Crt.  Ratio (mcg/mg creat)   Date Value   07/27/2018 CANNOT BE CALCULATED     LDL Cholesterol (mg/dL)   Date Value   01/23/2018 54     AST (U/L)   Date Value   01/23/2018 13     ALT (U/L)   Date Value   01/23/2018 7     BUN (mg/dL)   Date Value   10/04/2018 7 (L)      (goal A1C is < 7)   (goal LDL is <100) need 30-50% reduction from baseline     BP Readings from Last 3 Encounters:   03/07/19 103/72   10/30/18 101/63   10/25/18 109/83    (goal /80)      All Future Testing planned in [facility]:  Lab Frequency Next Occurrence   Lipid Panel Once 06/08/2019   CBC With Auto Differential Once 06/15/2019   Comprehensive Metabolic Panel Once 99/13/7696   Ferritin Once 06/08/2019   Iron and TIBC Once 06/08/2019   Hemoglobin A1C Once 06/08/2019   Transfuse RBC     CBC Auto Differential     Ferritin     Iron And TIBC              Patient Active Problem List:     COPD, moderate     HTN (hypertension)     Hyperlipemia     Smoker     mild Cardiomyopathy-ischemic ejection fraction 40-45%      Coronary artery disease involving native coronary artery of native heart with angina pectoris ([facility] [address]     recovered Alcoholic quit in 8180     Closed head injury with right temporal parenchymal bleed in 99. Prostate cancer adenocarcinoma. [place] stage 6 staus post laparoscopic prostatectomy 2009.      History of right  inguinal hernia repair     Transfusion history--2008 and 2009      COPD (chronic obstructive pulmonary disease) (HCC)     Erectile dysfunction     Personal history of malignant neoplasm of prostate     Hyperlipidemia     Alcohol dependence in remission ([facility] [address]     CKD (chronic kidney disease)     Prediabetes     History of colon polyps     Anemia     Iron deficiency anemia due to chronic blood loss     CAD in native artery

## 2019-04-09 ENCOUNTER — HOSPITAL ENCOUNTER (OUTPATIENT)
Facility: MEDICAL CENTER | Age: 64
Discharge: HOME | End: 2019-04-09
Payer: MEDICARE

## 2019-04-09 DIAGNOSIS — D50.0 IRON DEFICIENCY ANEMIA DUE TO CHRONIC BLOOD LOSS: ICD-10-CM

## 2019-04-09 LAB
ABSOLUTE EOS #: 0.2 K/UL (ref 0–0.4)
ABSOLUTE IMMATURE GRANULOCYTE: ABNORMAL K/UL (ref 0–0.3)
ABSOLUTE LYMPH #: 1.3 K/UL (ref 1–4.8)
ABSOLUTE MONO #: 0.6 K/UL (ref 0.2–0.8)
BASOPHILS # BLD: 0 % (ref 0–2)
BASOPHILS ABSOLUTE: 0 K/UL (ref 0–0.2)
DIFFERENTIAL TYPE: ABNORMAL
EOSINOPHILS RELATIVE PERCENT: 4 % (ref 1–4)
FERRITIN: 35 UG/L (ref 30–400)
HCT VFR BLD CALC: 28.8 % (ref 41–53)
HEMOGLOBIN: 9.3 G/DL (ref 13.5–17.5)
IMMATURE GRANULOCYTES: ABNORMAL %
IRON SATURATION: 7 % (ref 20–55)
IRON: 24 UG/DL (ref 59–158)
LYMPHOCYTES # BLD: 24 % (ref 24–44)
MCH RBC QN AUTO: 24.9 PG (ref 26–34)
MCHC RBC AUTO-ENTMCNC: 32.3 G/DL (ref 31–37)
MCV RBC AUTO: 77.3 FL (ref 80–100)
MONOCYTES # BLD: 11 % (ref 1–7)
NRBC AUTOMATED: ABNORMAL PER 100 WBC
PDW BLD-RTO: 18 % (ref 11.5–14.5)
PLATELET # BLD: 424 K/UL (ref 130–400)
PLATELET ESTIMATE: ABNORMAL
PMV BLD AUTO: 6.4 FL (ref 6–12)
RBC # BLD: 3.73 M/UL (ref 4.5–5.9)
RBC # BLD: ABNORMAL 10*6/UL
SEG NEUTROPHILS: 61 % (ref 36–66)
SEGMENTED NEUTROPHILS ABSOLUTE COUNT: 3.5 K/UL (ref 1.8–7.7)
TOTAL IRON BINDING CAPACITY: 358 UG/DL (ref 250–450)
UNSATURATED IRON BINDING CAPACITY: 334 UG/DL (ref 112–347)
WBC # BLD: 5.7 K/UL (ref 3.5–11)
WBC # BLD: ABNORMAL 10*3/UL

## 2019-04-09 PROCEDURE — 36415 COLL VENOUS BLD VENIPUNCTURE: CPT

## 2019-04-09 PROCEDURE — 82728 ASSAY OF FERRITIN: CPT

## 2019-04-09 PROCEDURE — 85025 COMPLETE CBC W/AUTO DIFF WBC: CPT

## 2019-04-09 PROCEDURE — 83550 IRON BINDING TEST: CPT

## 2019-04-09 PROCEDURE — 83540 ASSAY OF IRON: CPT

## 2019-04-10 ENCOUNTER — HOSPITAL ENCOUNTER (OUTPATIENT)
Facility: MEDICAL CENTER | Age: 64
End: 2019-04-10
Payer: MEDICARE

## 2019-04-16 ENCOUNTER — TELEPHONE (OUTPATIENT)
Dept: ONCOLOGY | Age: 64
End: 2019-04-16

## 2019-04-16 ENCOUNTER — OFFICE VISIT (OUTPATIENT)
Dept: ONCOLOGY | Age: 64
End: 2019-04-16
Payer: MEDICARE

## 2019-04-16 VITALS
TEMPERATURE: 97.9 F | HEART RATE: 79 BPM | BODY MASS INDEX: 21.05 KG/M2 | WEIGHT: 155.2 LBS | RESPIRATION RATE: 18 BRPM | DIASTOLIC BLOOD PRESSURE: 69 MMHG | SYSTOLIC BLOOD PRESSURE: 119 MMHG

## 2019-04-16 DIAGNOSIS — K90.89 OTHER SPECIFIED INTESTINAL MALABSORPTION: ICD-10-CM

## 2019-04-16 DIAGNOSIS — D50.8 IRON DEFICIENCY ANEMIA SECONDARY TO INADEQUATE DIETARY IRON INTAKE: ICD-10-CM

## 2019-04-16 PROBLEM — K90.9 MALABSORPTION: Status: ACTIVE | Noted: 2019-04-16

## 2019-04-16 PROCEDURE — G8420 CALC BMI NORM PARAMETERS: HCPCS | Performed by: INTERNAL MEDICINE

## 2019-04-16 PROCEDURE — G8427 DOCREV CUR MEDS BY ELIG CLIN: HCPCS | Performed by: INTERNAL MEDICINE

## 2019-04-16 PROCEDURE — 3017F COLORECTAL CA SCREEN DOC REV: CPT | Performed by: INTERNAL MEDICINE

## 2019-04-16 PROCEDURE — 99211 OFF/OP EST MAY X REQ PHY/QHP: CPT

## 2019-04-16 PROCEDURE — 99214 OFFICE O/P EST MOD 30 MIN: CPT | Performed by: INTERNAL MEDICINE

## 2019-04-16 PROCEDURE — G8598 ASA/ANTIPLAT THER USED: HCPCS | Performed by: INTERNAL MEDICINE

## 2019-04-16 PROCEDURE — 1036F TOBACCO NON-USER: CPT | Performed by: INTERNAL MEDICINE

## 2019-04-16 RX ORDER — DIPHENHYDRAMINE HYDROCHLORIDE 50 MG/ML
50 INJECTION INTRAMUSCULAR; INTRAVENOUS ONCE
Status: CANCELLED | OUTPATIENT
Start: 2019-05-02

## 2019-04-16 RX ORDER — 0.9 % SODIUM CHLORIDE 0.9 %
10 VIAL (ML) INJECTION ONCE
Status: CANCELLED | OUTPATIENT
Start: 2019-05-02

## 2019-04-16 RX ORDER — SODIUM CHLORIDE 9 MG/ML
INJECTION, SOLUTION INTRAVENOUS CONTINUOUS
Status: CANCELLED | OUTPATIENT
Start: 2019-05-02

## 2019-04-16 RX ORDER — PANTOPRAZOLE SODIUM 40 MG/1
40 TABLET, DELAYED RELEASE ORAL DAILY
Qty: 90 TABLET | Refills: 3 | Status: SHIPPED | OUTPATIENT
Start: 2019-04-16 | End: 2019-07-11 | Stop reason: SDUPTHER

## 2019-04-16 RX ORDER — HEPARIN SODIUM (PORCINE) LOCK FLUSH IV SOLN 100 UNIT/ML 100 UNIT/ML
500 SOLUTION INTRAVENOUS PRN
Status: CANCELLED | OUTPATIENT
Start: 2019-05-02

## 2019-04-16 RX ORDER — SODIUM CHLORIDE 0.9 % (FLUSH) 0.9 %
10 SYRINGE (ML) INJECTION PRN
Status: CANCELLED | OUTPATIENT
Start: 2019-05-02

## 2019-04-16 RX ORDER — SODIUM CHLORIDE 0.9 % (FLUSH) 0.9 %
5 SYRINGE (ML) INJECTION PRN
Status: CANCELLED | OUTPATIENT
Start: 2019-05-02

## 2019-04-16 RX ORDER — METHYLPREDNISOLONE SODIUM SUCCINATE 125 MG/2ML
125 INJECTION, POWDER, LYOPHILIZED, FOR SOLUTION INTRAMUSCULAR; INTRAVENOUS ONCE
Status: CANCELLED | OUTPATIENT
Start: 2019-05-02

## 2019-04-16 RX ORDER — PANTOPRAZOLE SODIUM 40 MG/1
40 TABLET, DELAYED RELEASE ORAL DAILY
Qty: 30 TABLET | Refills: 3 | Status: SHIPPED | OUTPATIENT
Start: 2019-04-16 | End: 2019-04-16 | Stop reason: SDUPTHER

## 2019-04-16 NOTE — TELEPHONE ENCOUNTER
SCRIPT WAS WRITTEN TODAY AT MD EXAM, PHARMACY FAX NOTICE REQUESTING 90 DAY SUPPLY INSTEAD OF THE 30 DAY THAT WAS WRITTEN.   PENDED TO MD.

## 2019-04-16 NOTE — TELEPHONE ENCOUNTER
Hernandez Zhou MD VISIT  DR Jose Antonio Marino IN TO SEE PATIENT  ORDERS RECEIVED  IV IRON INFUSION SOON.  PENDING PRECERT  RV 3 MONTHS W/LABS PRIOR  LABS CDP FE TIBC FERRITIN 7/5/19  MD VISIT 7/11/19 @2PM  SCRIPT SENT TO PATIENTS PHARMACY  AVS PRINTED AND GIVEN TO PATIENT WITH INSTRUCTIONS  PATIENT DISCHARGED AMBULATORY

## 2019-04-17 NOTE — PROGRESS NOTES
disease) ([facility] [address] Cough, Erectile dysfunction, History of transfusion of packed red blood cells, Hyperlipidemia, Hypertension, Nicotine dependence, Primary adenocarcinoma of prostate ([facility] [address] Urinary incontinence, Wears dentures, and Wears glasses. PAST SURGICAL HISTORY: has a past surgical history that includes Cardiac catheterization (2006); Endoscopy, colon, diagnostic; Upper gastrointestinal endoscopy (2008); Prostatectomy (2009); Cardiac catheterization (10/2015); Colonoscopy (2008); Inguinal hernia repair (Right); pr colon ca scrn not hi rsk ind (N/A, 9/19/2017); pr esophagogastroduodenoscopy transoral diagnostic (N/A, 9/19/2017); Upper gastrointestinal endoscopy (09/19/2017); Colonoscopy (09/19/2017); Colonoscopy (1/23/2018); Upper gastrointestinal endoscopy (1/23/2018); and Cardiac catheterization (10/03/2018). CURRENT MEDICATIONS:  has a current medication list which includes the following prescription(s): famotidine, tiotropium, amlodipine, albuterol sulfate hfa, budesonide-formoterol, atorvastatin, ranolazine, metoprolol tartrate, aspirin, nitroglycerin, clopidogrel, isosorbide mononitrate, docusate sodium, and pantoprazole. ALLERGIES:  is allergic to phenytoin sodium extended. FAMILY HISTORY: Negative for any hematological or oncological conditions. SOCIAL HISTORY:  reports that he quit smoking about 6 months ago. His smoking use included cigarettes. He has a 25.00 pack-year smoking history. He has never used smokeless tobacco. He reports that he has current or past drug history. Drug: Marijuana. Frequency: 1.00 time per week. He reports that he does not drink alcohol. REVIEW OF SYSTEMS:     · General: No weakness or fatigue. No unanticipated weight loss or decreased appetite. No fever or chills. · Eyes: No blurred vision, eye pain or double vision. · Ears: No hearing problems or drainage. No tinnitus. · Throat: No sore throat, problems with swallowing or dysphagia.    · Respiratory: WBC 5.7   HGB 9.3*   HCT 28.8*   MCV 77.3*   *     Lab Results   Component Value Date    IRON 24 (L) 04/09/2019    TIBC 358 04/09/2019    FERRITIN 35 04/09/2019         IMPRESSION:   Iron deficiency anemia secondary to chronic GI blood loss  Status post GI bleeding  Gastric AV malformation  Status post cardiac cath and coronary stents placement. PLAN: I reviewed the labs as above and discussed with the patient. I explained to the patient the nature of this hematologic problem. Patient had severe iron deficiency anemia related to GI blood loss in January 2018. He had excellent response to IV iron infusion and he had blood transfusion that time. Repeated labs showed a drop of Hb and Iron. No clear evidence of bleeding. I will give IV Iron. We will monitor closely. Will prescribe Protonix. Patient will continue to take aspirin and Plavix. Status post recent cardiac stent placement. He will also take Protonix. Labs will be repeated in 3 months. Sooner if he develops any problems. He will have continued monitoring since it is possible for him to have repeated episodes of bleeding from AV malformation. Patient understands and agrees. Patient's questions were answered to the best of his satisfaction and he verbalized full understanding and agreement.

## 2019-04-19 DIAGNOSIS — I10 ESSENTIAL HYPERTENSION: ICD-10-CM

## 2019-04-19 DIAGNOSIS — I25.10 CAD S/P PERCUTANEOUS CORONARY ANGIOPLASTY: ICD-10-CM

## 2019-04-19 DIAGNOSIS — I25.5 ISCHEMIC CARDIOMYOPATHY: ICD-10-CM

## 2019-04-19 DIAGNOSIS — Z98.61 CAD S/P PERCUTANEOUS CORONARY ANGIOPLASTY: ICD-10-CM

## 2019-04-19 DIAGNOSIS — R07.9 CHEST PAIN, UNSPECIFIED TYPE: ICD-10-CM

## 2019-04-20 NOTE — TELEPHONE ENCOUNTER
E-scribe request for Metoprolol, Isosorbide, Nitro. Please review and e-scribe if applicable. Next Visit Date:  Future Appointments   Date Time Provider Nando Stallings   4/29/2019  3:30 PM Jori Dickens MD [facility]   [facility]   7/5/2019 12:00 PM SCHEDULE, [facility]   7/11/2019  2:00 PM Lucy Kimbrough MD [id number] Maintenance   Topic Date Due    Hepatitis C screen  1955    HIV screen  10/12/1969    DTaP/Tdap/Td vaccine (1 - Tdap) 10/12/1973    Shingles Vaccine (1 of 2) 10/12/2004    A1C test (Diabetic or Prediabetic)  07/24/2019    Lipid screen  01/23/2023    Colon cancer screen colonoscopy  01/23/2028    Flu vaccine  Completed    Pneumococcal 0-64 years Vaccine  Completed               (applicable per patient's age: Cancer Screenings, Depression Screening, Fall Risk Screening, Immunizations)    Hemoglobin A1C (%)   Date Value   07/24/2018 5.9   01/30/2018 5.0   03/15/2017 5.9     Microalb/Crt.  Ratio (mcg/mg creat)   Date Value   07/27/2018 CANNOT BE CALCULATED     LDL Cholesterol (mg/dL)   Date Value   01/23/2018 54     AST (U/L)   Date Value   01/23/2018 13     ALT (U/L)   Date Value   01/23/2018 7     BUN (mg/dL)   Date Value   10/04/2018 7 (L)      (goal A1C is < 7)   (goal LDL is <100) need 30-50% reduction from baseline     BP Readings from Last 3 Encounters:   04/16/19 119/69   03/07/19 103/72   10/30/18 101/63    (goal /80)      All Future Testing planned in [facility]:  Lab Frequency Next Occurrence   Lipid Panel Once 06/08/2019   CBC With Auto Differential Once 06/15/2019   Comprehensive Metabolic Panel Once 28/61/7655   Ferritin Once 06/08/2019   Iron and TIBC Once 06/08/2019   Hemoglobin A1C Once 06/08/2019   Transfuse RBC     CBC Auto Differential     Ferritin     Iron And TIBC              Patient Active Problem List:     COPD, moderate     HTN (hypertension)

## 2019-04-21 RX ORDER — NITROGLYCERIN 0.3 MG/1
TABLET SUBLINGUAL
Qty: 100 TABLET | Refills: 0 | Status: SHIPPED | OUTPATIENT
Start: 2019-04-21 | End: 2019-06-19 | Stop reason: SDUPTHER

## 2019-04-21 RX ORDER — ISOSORBIDE MONONITRATE 30 MG/1
30 TABLET, EXTENDED RELEASE ORAL DAILY
Qty: 30 TABLET | Refills: 2 | Status: SHIPPED | OUTPATIENT
Start: 2019-04-21 | End: 2019-07-18 | Stop reason: SDUPTHER

## 2019-04-23 ENCOUNTER — TELEPHONE (OUTPATIENT)
Dept: INFUSION THERAPY | Facility: MEDICAL CENTER | Age: 64
End: 2019-04-23

## 2019-04-24 ENCOUNTER — TELEPHONE (OUTPATIENT)
Dept: GASTROENTEROLOGY | Age: 64
End: 2019-04-24

## 2019-05-02 ENCOUNTER — HOSPITAL ENCOUNTER (OUTPATIENT)
Dept: INFUSION THERAPY | Facility: MEDICAL CENTER | Age: 64
Discharge: HOME | End: 2019-05-02
Payer: MEDICARE

## 2019-05-02 VITALS
RESPIRATION RATE: 16 BRPM | TEMPERATURE: 97.7 F | SYSTOLIC BLOOD PRESSURE: 107 MMHG | DIASTOLIC BLOOD PRESSURE: 74 MMHG | HEART RATE: 66 BPM

## 2019-05-02 DIAGNOSIS — D50.8 IRON DEFICIENCY ANEMIA SECONDARY TO INADEQUATE DIETARY IRON INTAKE: Primary | ICD-10-CM

## 2019-05-02 DIAGNOSIS — K90.89 OTHER SPECIFIED INTESTINAL MALABSORPTION: ICD-10-CM

## 2019-05-02 PROCEDURE — 6360000002 HC RX W HCPCS: Performed by: INTERNAL MEDICINE

## 2019-05-02 PROCEDURE — 2580000003 HC RX 258: Performed by: INTERNAL MEDICINE

## 2019-05-02 PROCEDURE — 96365 THER/PROPH/DIAG IV INF INIT: CPT

## 2019-05-02 RX ORDER — SODIUM CHLORIDE 9 MG/ML
INJECTION, SOLUTION INTRAVENOUS CONTINUOUS
Status: ACTIVE | OUTPATIENT
Start: 2019-05-02 | End: 2019-05-02

## 2019-05-02 RX ORDER — 0.9 % SODIUM CHLORIDE 0.9 %
10 VIAL (ML) INJECTION ONCE
Status: CANCELLED | OUTPATIENT
Start: 2019-05-09

## 2019-05-02 RX ORDER — SODIUM CHLORIDE 9 MG/ML
INJECTION, SOLUTION INTRAVENOUS CONTINUOUS
Status: CANCELLED | OUTPATIENT
Start: 2019-05-09

## 2019-05-02 RX ORDER — SODIUM CHLORIDE 0.9 % (FLUSH) 0.9 %
5 SYRINGE (ML) INJECTION PRN
Status: CANCELLED | OUTPATIENT
Start: 2019-05-09

## 2019-05-02 RX ORDER — METHYLPREDNISOLONE SODIUM SUCCINATE 125 MG/2ML
125 INJECTION, POWDER, LYOPHILIZED, FOR SOLUTION INTRAMUSCULAR; INTRAVENOUS ONCE
Status: CANCELLED | OUTPATIENT
Start: 2019-05-09

## 2019-05-02 RX ORDER — HEPARIN SODIUM (PORCINE) LOCK FLUSH IV SOLN 100 UNIT/ML 100 UNIT/ML
500 SOLUTION INTRAVENOUS PRN
Status: DISCONTINUED | OUTPATIENT
Start: 2019-05-02 | End: 2019-05-03 | Stop reason: HOSPADM

## 2019-05-02 RX ORDER — DIPHENHYDRAMINE HYDROCHLORIDE 50 MG/ML
50 INJECTION INTRAMUSCULAR; INTRAVENOUS ONCE
Status: CANCELLED | OUTPATIENT
Start: 2019-05-09

## 2019-05-02 RX ORDER — HEPARIN SODIUM (PORCINE) LOCK FLUSH IV SOLN 100 UNIT/ML 100 UNIT/ML
500 SOLUTION INTRAVENOUS PRN
Status: CANCELLED | OUTPATIENT
Start: 2019-05-09

## 2019-05-02 RX ORDER — SODIUM CHLORIDE 0.9 % (FLUSH) 0.9 %
10 SYRINGE (ML) INJECTION PRN
Status: CANCELLED | OUTPATIENT
Start: 2019-05-09

## 2019-05-02 RX ORDER — SODIUM CHLORIDE 0.9 % (FLUSH) 0.9 %
10 SYRINGE (ML) INJECTION PRN
Status: DISCONTINUED | OUTPATIENT
Start: 2019-05-02 | End: 2019-05-03 | Stop reason: HOSPADM

## 2019-05-02 RX ADMIN — SODIUM CHLORIDE: 9 INJECTION, SOLUTION INTRAVENOUS at 10:00

## 2019-05-02 RX ADMIN — FERRIC CARBOXYMALTOSE INJECTION 750 MG: 50 INJECTION, SOLUTION INTRAVENOUS at 10:13

## 2019-05-02 NOTE — PROGRESS NOTES
C[place]ova Holstein here per ambulatory for dose one of two of Injectafer. Explained injectafer is iron for anemia. Discussed potential infusion reaction and post infusion reaction side effects. Pt verbalized understanding. injectafer over 20 minutes, with a 30 minute observation period with normal saline running. Pt back in one week.

## 2019-05-09 ENCOUNTER — HOSPITAL ENCOUNTER (OUTPATIENT)
Dept: INFUSION THERAPY | Age: 64
Discharge: HOME | End: 2019-05-09
Payer: MEDICARE

## 2019-05-09 VITALS
HEART RATE: 82 BPM | SYSTOLIC BLOOD PRESSURE: 110 MMHG | RESPIRATION RATE: 16 BRPM | WEIGHT: 154 LBS | TEMPERATURE: 97.6 F | DIASTOLIC BLOOD PRESSURE: 72 MMHG | BODY MASS INDEX: 20.89 KG/M2

## 2019-05-09 DIAGNOSIS — K90.89 OTHER SPECIFIED INTESTINAL MALABSORPTION: ICD-10-CM

## 2019-05-09 DIAGNOSIS — D50.8 IRON DEFICIENCY ANEMIA SECONDARY TO INADEQUATE DIETARY IRON INTAKE: Primary | ICD-10-CM

## 2019-05-09 PROCEDURE — 96365 THER/PROPH/DIAG IV INF INIT: CPT | Performed by: NURSE PRACTITIONER

## 2019-05-09 PROCEDURE — 2580000003 HC RX 258: Performed by: INTERNAL MEDICINE

## 2019-05-09 PROCEDURE — 6360000002 HC RX W HCPCS: Performed by: INTERNAL MEDICINE

## 2019-05-09 RX ORDER — SODIUM CHLORIDE 9 MG/ML
INJECTION, SOLUTION INTRAVENOUS CONTINUOUS
Status: DISCONTINUED | OUTPATIENT
Start: 2019-05-09 | End: 2019-05-10 | Stop reason: HOSPADM

## 2019-05-09 RX ORDER — SODIUM CHLORIDE 9 MG/ML
INJECTION, SOLUTION INTRAVENOUS CONTINUOUS
Status: CANCELLED | OUTPATIENT
Start: 2019-05-09

## 2019-05-09 RX ORDER — SODIUM CHLORIDE 0.9 % (FLUSH) 0.9 %
5 SYRINGE (ML) INJECTION PRN
Status: CANCELLED | OUTPATIENT
Start: 2019-05-09

## 2019-05-09 RX ORDER — 0.9 % SODIUM CHLORIDE 0.9 %
10 VIAL (ML) INJECTION ONCE
Status: CANCELLED | OUTPATIENT
Start: 2019-05-09

## 2019-05-09 RX ORDER — EPINEPHRINE 1 MG/ML
0.3 INJECTION, SOLUTION, CONCENTRATE INTRAVENOUS PRN
Status: CANCELLED | OUTPATIENT
Start: 2019-05-09

## 2019-05-09 RX ORDER — HEPARIN SODIUM (PORCINE) LOCK FLUSH IV SOLN 100 UNIT/ML 100 UNIT/ML
500 SOLUTION INTRAVENOUS PRN
Status: CANCELLED | OUTPATIENT
Start: 2019-05-09

## 2019-05-09 RX ORDER — METHYLPREDNISOLONE SODIUM SUCCINATE 125 MG/2ML
125 INJECTION, POWDER, LYOPHILIZED, FOR SOLUTION INTRAMUSCULAR; INTRAVENOUS ONCE
Status: CANCELLED | OUTPATIENT
Start: 2019-05-09

## 2019-05-09 RX ORDER — SODIUM CHLORIDE 0.9 % (FLUSH) 0.9 %
10 SYRINGE (ML) INJECTION PRN
Status: CANCELLED | OUTPATIENT
Start: 2019-05-09

## 2019-05-09 RX ORDER — DIPHENHYDRAMINE HYDROCHLORIDE 50 MG/ML
50 INJECTION INTRAMUSCULAR; INTRAVENOUS ONCE
Status: CANCELLED | OUTPATIENT
Start: 2019-05-09

## 2019-05-09 RX ADMIN — FERRIC CARBOXYMALTOSE INJECTION 750 MG: 50 INJECTION, SOLUTION INTRAVENOUS at 15:53

## 2019-05-09 RX ADMIN — SODIUM CHLORIDE: 9 INJECTION, SOLUTION INTRAVENOUS at 15:40

## 2019-05-09 NOTE — PROGRESS NOTES
Patient tolerated week 2 of his injectafer well. Stable and ambulatory at d/c. His brother-in-Law was waiting to drive him home.

## 2019-06-03 ENCOUNTER — HOSPITAL ENCOUNTER (OUTPATIENT)
Age: 64
Setting detail: SPECIMEN
Discharge: HOME | End: 2019-06-03
Payer: MEDICARE

## 2019-06-03 DIAGNOSIS — N18.30 STAGE 3 CHRONIC KIDNEY DISEASE (HCC): ICD-10-CM

## 2019-06-03 DIAGNOSIS — R73.03 PREDIABETES: ICD-10-CM

## 2019-06-03 DIAGNOSIS — I10 ESSENTIAL HYPERTENSION: ICD-10-CM

## 2019-06-03 DIAGNOSIS — I25.10 CORONARY ARTERY DISEASE INVOLVING NATIVE CORONARY ARTERY OF NATIVE HEART WITHOUT ANGINA PECTORIS: ICD-10-CM

## 2019-06-03 DIAGNOSIS — I25.5 ISCHEMIC CARDIOMYOPATHY: ICD-10-CM

## 2019-06-03 DIAGNOSIS — D50.0 IRON DEFICIENCY ANEMIA DUE TO CHRONIC BLOOD LOSS: ICD-10-CM

## 2019-06-03 DIAGNOSIS — E78.00 PURE HYPERCHOLESTEROLEMIA: ICD-10-CM

## 2019-06-04 ENCOUNTER — HOSPITAL ENCOUNTER (OUTPATIENT)
Age: 64
Setting detail: SPECIMEN
Discharge: HOME | End: 2019-06-04
Payer: MEDICARE

## 2019-06-04 LAB
ABSOLUTE EOS #: 0.27 K/UL (ref 0–0.4)
ABSOLUTE IMMATURE GRANULOCYTE: 0 K/UL (ref 0–0.3)
ABSOLUTE LYMPH #: 1.44 K/UL (ref 1–4.8)
ABSOLUTE MONO #: 0.5 K/UL (ref 0.1–0.8)
ALBUMIN SERPL-MCNC: 4.3 G/DL (ref 3.5–5.2)
ALBUMIN/GLOBULIN RATIO: 1.6 (ref 1–2.5)
ALP BLD-CCNC: 70 U/L (ref 40–129)
ALT SERPL-CCNC: 17 U/L (ref 5–41)
ANION GAP SERPL CALCULATED.3IONS-SCNC: 11 MMOL/L (ref 9–17)
AST SERPL-CCNC: 20 U/L
BASOPHILS # BLD: 1 % (ref 0–2)
BASOPHILS ABSOLUTE: 0.05 K/UL (ref 0–0.2)
BILIRUB SERPL-MCNC: 0.25 MG/DL (ref 0.3–1.2)
BUN BLDV-MCNC: 10 MG/DL (ref 8–23)
BUN/CREAT BLD: ABNORMAL (ref 9–20)
CALCIUM SERPL-MCNC: 8.1 MG/DL (ref 8.6–10.4)
CHLORIDE BLD-SCNC: 106 MMOL/L (ref 98–107)
CHOLESTEROL/HDL RATIO: 3.1
CHOLESTEROL: 107 MG/DL
CO2: 24 MMOL/L (ref 20–31)
CREAT SERPL-MCNC: 1.12 MG/DL (ref 0.7–1.2)
DIFFERENTIAL TYPE: ABNORMAL
EOSINOPHILS RELATIVE PERCENT: 6 % (ref 1–4)
ESTIMATED AVERAGE GLUCOSE: 88 MG/DL
FERRITIN: 275 UG/L (ref 30–400)
GFR AFRICAN AMERICAN: >60 ML/MIN
GFR NON-AFRICAN AMERICAN: >60 ML/MIN
GFR SERPL CREATININE-BSD FRML MDRD: ABNORMAL ML/MIN/{1.73_M2}
GFR SERPL CREATININE-BSD FRML MDRD: ABNORMAL ML/MIN/{1.73_M2}
GLUCOSE BLD-MCNC: 94 MG/DL (ref 70–99)
HBA1C MFR BLD: 4.7 % (ref 4–6)
HCT VFR BLD CALC: 43.1 % (ref 40.7–50.3)
HDLC SERPL-MCNC: 34 MG/DL
HEMOGLOBIN: 12.7 G/DL (ref 13–17)
IMMATURE GRANULOCYTES: 0 %
IRON SATURATION: 30 % (ref 20–55)
IRON: 74 UG/DL (ref 59–158)
LDL CHOLESTEROL: 61 MG/DL (ref 0–130)
LYMPHOCYTES # BLD: 32 % (ref 24–44)
MCH RBC QN AUTO: 27.4 PG (ref 25.2–33.5)
MCHC RBC AUTO-ENTMCNC: 29.5 G/DL (ref 28.4–34.8)
MCV RBC AUTO: 93.1 FL (ref 82.6–102.9)
MONOCYTES # BLD: 11 % (ref 1–7)
MORPHOLOGY: ABNORMAL
MORPHOLOGY: ABNORMAL
NRBC AUTOMATED: 0 PER 100 WBC
PDW BLD-RTO: 22.5 % (ref 11.8–14.4)
PLATELET # BLD: 234 K/UL (ref 138–453)
PLATELET ESTIMATE: ABNORMAL
PMV BLD AUTO: 10.2 FL (ref 8.1–13.5)
POTASSIUM SERPL-SCNC: 4.1 MMOL/L (ref 3.7–5.3)
RBC # BLD: 4.63 M/UL (ref 4.21–5.77)
RBC # BLD: ABNORMAL 10*6/UL
SEG NEUTROPHILS: 50 % (ref 36–66)
SEGMENTED NEUTROPHILS ABSOLUTE COUNT: 2.24 K/UL (ref 1.8–7.7)
SODIUM BLD-SCNC: 141 MMOL/L (ref 135–144)
TOTAL IRON BINDING CAPACITY: 247 UG/DL (ref 250–450)
TOTAL PROTEIN: 7 G/DL (ref 6.4–8.3)
TRIGL SERPL-MCNC: 60 MG/DL
UNSATURATED IRON BINDING CAPACITY: 173 UG/DL (ref 112–347)
VLDLC SERPL CALC-MCNC: ABNORMAL MG/DL (ref 1–30)
WBC # BLD: 4.5 K/UL (ref 3.5–11.3)
WBC # BLD: ABNORMAL 10*3/UL

## 2019-06-04 PROCEDURE — 80061 LIPID PANEL: CPT

## 2019-06-04 PROCEDURE — 82728 ASSAY OF FERRITIN: CPT

## 2019-06-04 PROCEDURE — 83540 ASSAY OF IRON: CPT

## 2019-06-04 PROCEDURE — 85025 COMPLETE CBC W/AUTO DIFF WBC: CPT

## 2019-06-04 PROCEDURE — 83036 HEMOGLOBIN GLYCOSYLATED A1C: CPT

## 2019-06-04 PROCEDURE — 83550 IRON BINDING TEST: CPT

## 2019-06-04 PROCEDURE — 36415 COLL VENOUS BLD VENIPUNCTURE: CPT

## 2019-06-04 PROCEDURE — 80053 COMPREHEN METABOLIC PANEL: CPT

## 2019-06-13 ENCOUNTER — OFFICE VISIT (OUTPATIENT)
Dept: INTERNAL MEDICINE | Age: 64
End: 2019-06-13
Payer: MEDICARE

## 2019-06-13 VITALS
WEIGHT: 150 LBS | DIASTOLIC BLOOD PRESSURE: 77 MMHG | BODY MASS INDEX: 20.32 KG/M2 | HEART RATE: 79 BPM | SYSTOLIC BLOOD PRESSURE: 108 MMHG | OXYGEN SATURATION: 93 % | HEIGHT: 72 IN

## 2019-06-13 DIAGNOSIS — I10 ESSENTIAL HYPERTENSION: ICD-10-CM

## 2019-06-13 DIAGNOSIS — E78.00 PURE HYPERCHOLESTEROLEMIA: ICD-10-CM

## 2019-06-13 DIAGNOSIS — R73.03 PREDIABETES: ICD-10-CM

## 2019-06-13 DIAGNOSIS — N18.30 STAGE 3 CHRONIC KIDNEY DISEASE (HCC): ICD-10-CM

## 2019-06-13 DIAGNOSIS — D50.0 IRON DEFICIENCY ANEMIA DUE TO CHRONIC BLOOD LOSS: ICD-10-CM

## 2019-06-13 DIAGNOSIS — J44.9 MODERATE COPD (CHRONIC OBSTRUCTIVE PULMONARY DISEASE) (HCC): Primary | ICD-10-CM

## 2019-06-13 DIAGNOSIS — Z98.61 CAD S/P PERCUTANEOUS CORONARY ANGIOPLASTY: ICD-10-CM

## 2019-06-13 DIAGNOSIS — I25.10 CAD S/P PERCUTANEOUS CORONARY ANGIOPLASTY: ICD-10-CM

## 2019-06-13 DIAGNOSIS — I25.5 ISCHEMIC CARDIOMYOPATHY: ICD-10-CM

## 2019-06-13 PROCEDURE — 3023F SPIROM DOC REV: CPT | Performed by: INTERNAL MEDICINE

## 2019-06-13 PROCEDURE — G8427 DOCREV CUR MEDS BY ELIG CLIN: HCPCS | Performed by: INTERNAL MEDICINE

## 2019-06-13 PROCEDURE — G8420 CALC BMI NORM PARAMETERS: HCPCS | Performed by: INTERNAL MEDICINE

## 2019-06-13 PROCEDURE — 99214 OFFICE O/P EST MOD 30 MIN: CPT | Performed by: INTERNAL MEDICINE

## 2019-06-13 PROCEDURE — 1036F TOBACCO NON-USER: CPT | Performed by: INTERNAL MEDICINE

## 2019-06-13 PROCEDURE — 3017F COLORECTAL CA SCREEN DOC REV: CPT | Performed by: INTERNAL MEDICINE

## 2019-06-13 PROCEDURE — G8926 SPIRO NO PERF OR DOC: HCPCS | Performed by: INTERNAL MEDICINE

## 2019-06-13 PROCEDURE — 99211 OFF/OP EST MAY X REQ PHY/QHP: CPT | Performed by: INTERNAL MEDICINE

## 2019-06-13 PROCEDURE — G8598 ASA/ANTIPLAT THER USED: HCPCS | Performed by: INTERNAL MEDICINE

## 2019-06-13 RX ORDER — BUDESONIDE AND FORMOTEROL FUMARATE DIHYDRATE 80; 4.5 UG/1; UG/1
2 AEROSOL RESPIRATORY (INHALATION) 2 TIMES DAILY
Qty: 2 INHALER | Refills: 5 | Status: SHIPPED | OUTPATIENT
Start: 2019-06-13 | End: 2019-11-22 | Stop reason: SDUPTHER

## 2019-06-13 RX ORDER — POLYETHYLENE GLYCOL 3350 17 G/17G
17 POWDER, FOR SOLUTION ORAL DAILY
Qty: 1530 G | Refills: 1 | Status: SHIPPED | OUTPATIENT
Start: 2019-06-13 | End: 2019-07-13

## 2019-06-13 RX ORDER — ALBUTEROL SULFATE 90 UG/1
2 AEROSOL, METERED RESPIRATORY (INHALATION) EVERY 6 HOURS PRN
Qty: 1 INHALER | Refills: 3 | Status: SHIPPED | OUTPATIENT
Start: 2019-06-13 | End: 2020-05-12 | Stop reason: SDUPTHER

## 2019-06-13 RX ORDER — PREDNISONE 20 MG/1
40 TABLET ORAL DAILY
Qty: 5 TABLET | Refills: 0 | Status: SHIPPED | OUTPATIENT
Start: 2019-06-13 | End: 2019-06-18

## 2019-06-13 RX ORDER — DOCUSATE SODIUM 100 MG/1
100 CAPSULE, LIQUID FILLED ORAL 2 TIMES DAILY
Qty: 30 CAPSULE | Refills: 2 | Status: SHIPPED | OUTPATIENT
Start: 2019-06-13 | End: 2019-11-22 | Stop reason: SDUPTHER

## 2019-06-13 RX ORDER — ATORVASTATIN CALCIUM 20 MG/1
20 TABLET, FILM COATED ORAL DAILY
Qty: 90 TABLET | Refills: 6 | Status: SHIPPED | OUTPATIENT
Start: 2019-06-13 | End: 2019-11-22 | Stop reason: ALTCHOICE

## 2019-06-13 ASSESSMENT — ENCOUNTER SYMPTOMS
CONSTIPATION: 1
BLOOD IN STOOL: 0
RHINORRHEA: 0
SHORTNESS OF BREATH: 1
WHEEZING: 0
ABDOMINAL PAIN: 0

## 2019-06-13 NOTE — PROGRESS NOTES
Visit Informationno  Have you changed or started any medications since your last visit including any over-the-counter medicines, vitamins, or herbal medicines? no   Have you stopped taking any of your medications? Is so, why? -  no  Are you having any side effects from any of your medications? - no    Have you seen any other physician or provider since your last visit? yes - Oncology, Jorge Luis Madelin   Have you had any other diagnostic tests since your last visit? yes - labs   Have you been seen in the emergency room and/or had an admission in a hospital since we last saw you?  no   Have you had your routine dental cleaning in the past 6 months?  no     Do you have an active MyChart account? If no, what is the barrier?   No:      Patient Care Team:  Kulwinder Camacho MD as PCP - General (Internal Medicine)  Kulwinder Camacho MD as PCP - [facility]  LINO Chawla CNP as Nurse Practitioner (Cardiology)  Mata Love RN as   Rashard Amador MD as Consulting Physician (Gastroenterology)    Medical History Review  Past Medical, Family, and Social History reviewed and does not contribute to the patient presenting condition    Health Maintenance   Topic Date Due    Hepatitis C screen  1955    HIV screen  10/12/1969    DTaP/Tdap/Td vaccine (1 - Tdap) 10/12/1973    Shingles Vaccine (1 of 2) 10/12/2004    A1C test (Diabetic or Prediabetic)  06/04/2020    Lipid screen  06/04/2024    Colon cancer screen colonoscopy  01/23/2028    Flu vaccine  Completed    Pneumococcal 0-64 years Vaccine  Completed

## 2019-06-13 NOTE — PATIENT INSTRUCTIONS
Medications e-scribe to pharmacy of pt's choice. Order for PFT faxed to [address] [place] [address] they will all pt for appt. Please call [phone] in not heard within 2 weeks. Patient will be contacted to schedule their next appointment.

## 2019-06-13 NOTE — PROGRESS NOTES
gastritis.      Recommendations      Dual antiplatelet therapy and risk factor modifications        Past Medical History:   Diagnosis Date    Alcohol withdrawal seizure ([facility] [address] 1991    quit ETOH since 1991    Blood loss anemia 2008    transfusion from chronic plavix and asa use    CAD (coronary artery disease) 2006    angioplast with drug eluting stent to l circumflex     Cardiomyopathy ([facility] [address]     Chest pain     Chronic kidney disease     CKD (chronic kidney disease) 8/9/2016    COPD (chronic obstructive pulmonary disease) ([facility] [address] 08/2012    severe obstructive disease with bronchospasm on PFT    Cough     \"smoker\"    Erectile dysfunction     History of transfusion of packed red blood cells 2009    s/p prostate surgery    Hyperlipidemia     Hypertension     Nicotine dependence     Primary adenocarcinoma of prostate ([facility] [address] 2009    tucker stage 6 s/p lap prostatectomy    Urinary incontinence     Wears dentures     upper and lower    Wears glasses        Past Surgical History:   Procedure Laterality Date    CARDIAC CATHETERIZATION  2006    drug eluting stent to LCX STENT X1    CARDIAC CATHETERIZATION  10/2015    STENT WAS PATENT    CARDIAC CATHETERIZATION  10/03/2018    STENT X1    COLONOSCOPY  2008    int hemorrhoids, tiny sigmoid polyp    COLONOSCOPY  09/19/2017    Normal    COLONOSCOPY  1/23/2018    COLONOSCOPY WITH BIOPSY performed by Katja Rey MD at [facility]    ENDOSCOPY, COLON, DIAGNOSTIC      INGUINAL HERNIA REPAIR Right     ND COLON CA SCRN NOT  [address]  IND N/A 9/19/2017    COLONOSCOPY performed by Jesika Taveras MD at [address] [facility] ESOPHAGOGASTRODUODENOSCOPY TRANSORAL DIAGNOSTIC N/A 9/19/2017    EGD ESOPHAGOGASTRODUODENOSCOPY performed by Jesika Taveras MD at [facility]  2009    adenocarcinoma tucker stage 6    UPPER GASTROINTESTINAL ENDOSCOPY  2008    normal    UPPER GASTROINTESTINAL ENDOSCOPY  09/19/2017    The cardia, fundus, incisura, antrum and pylorus were identified via direct visualization. The endoscopic exam showed 6 small to medium AVMs. Hemostasis was achieved by BiCap cautery. Two hemoclips were placed.       UPPER GASTROINTESTINAL ENDOSCOPY  1/23/2018    EGD BIOPSY performed by Maxwell Jackson MD at [facility]         ALLERGIES      Allergies   Allergen Reactions    Phenytoin Sodium Extended Rash       MEDICATIONS:      Current Outpatient Medications on File Prior to Visit   Medication Sig Dispense Refill    isosorbide mononitrate (IMDUR) 30 MG extended release tablet TAKE 1 TABLET BY MOUTH DAILY 30 tablet 2    nitroGLYCERIN (NITROSTAT) 0.3 MG SL tablet DISSOLVE 1 TABLET UNDER THE TONGUE EVERY 5 MINUTES AS NEEDED FOR CHEST PAIN UNTIL RELIEF IS OBTAINED, IF PAIN PERSISTS, CALL 911 100 tablet 0    pantoprazole (PROTONIX) 40 MG tablet Take 1 tablet by mouth daily 90 tablet 3    famotidine (PEPCID) 20 MG tablet TAKE 1 TABLET BY MOUTH TWICE DAILY 180 tablet 0    tiotropium (SPIRIVA HANDIHALER) 18 MCG inhalation capsule INHALE CONTENTS OF 1 CAPSULE INTO THE LUNGS ONCE DAILY 30 capsule 5    amLODIPine (NORVASC) 2.5 MG tablet Take 1 tablet by mouth daily 90 tablet 2    albuterol sulfate HFA (PROAIR HFA) 108 (90 Base) MCG/ACT inhaler Inhale 2 puffs into the lungs every 6 hours as needed for Wheezing 1 Inhaler 3    budesonide-formoterol (SYMBICORT) 80-4.5 MCG/ACT AERO Inhale 2 puffs into the lungs 2 times daily 2 Inhaler 5    atorvastatin (LIPITOR) 20 MG tablet Take 1 tablet by mouth daily 90 tablet 6    ranolazine (RANEXA) 500 MG extended release tablet Take 500 mg by mouth 2 times daily      metoprolol tartrate (LOPRESSOR) 25 MG tablet TAKE 1 TABLET BY MOUTH TWICE DAILY 180 tablet 0    aspirin 81 MG chewable tablet Take 1 tablet by mouth daily 30 tablet 3    clopidogrel (PLAVIX) 75 MG tablet Take 75 mg by mouth daily      docusate sodium (COLACE) 100 MG capsule Take 100 mg by mouth 2 times daily       No current facility-administered Exam      HENT: Normocephalic, Atraumatic, Bilateral external ears normal, Oropharynx moist,  Neck- Normal range of motion, No tenderness, Supple, No stridor. Eyes:  PERRL, EOMI, Conjunctiva normal, No discharge. Respiratory:  Distant breath sounds, No respiratory distress, No wheezing, No chest tenderness. Cardiovascular:  Normal heart rate, Normal rhythm, No murmurs  GI:  Bowel sounds normal, Soft, No tenderness, No masses . :   No CVA tenderness. Musculoskeletal:  Intact distal pulses, No edema, No tenderness,  Back- Notenderness. Integument:  Warm, Dry, No erythema, No rash. Lymphatic:  No lymphadenopathy noted. Neurologic:  Alert & oriented x 3, Normal motor function, Normal sensory function, No focal deficits noted. Psychiatric:  Affect normal         LABORATORY FINDINGS:    CBC:  Lab Results   Component Value Date    WBC 4.5 06/04/2019    HGB 12.7 06/04/2019     06/04/2019     BMP:    Lab Results   Component Value Date     06/04/2019    K 4.1 06/04/2019     06/04/2019    CO2 24 06/04/2019    BUN 10 06/04/2019    CREATININE 1.12 06/04/2019    GLUCOSE 94 06/04/2019     HEMOGLOBIN A1C:   Lab Results   Component Value Date    LABA1C 4.7 06/04/2019     MICROALBUMIN URINE:   Lab Results   Component Value Date    MICROALBUR <12 07/27/2018     FASTING LIPID [email or web address]  Lab Results   Component Value Date    LDLCHOLESTEROL 61 06/04/2019       LIVER PROFILE:  Lab Results   Component Value Date    ALT 17 06/04/2019    AST 20 06/04/2019    PROT 7.0 06/04/2019    BILITOT 0.25 06/04/2019    BILIDIR <0.08 07/26/2016    LABALBU 4.3 06/04/2019      THYROID FUNCTION:   Lab Results   Component Value Date    TSH 1.53 10/25/2018      URINEANALYSIS: No results found for: LABURIN  ASSESSMENT AND PLAN:    1.  Moderate COPD (chronic obstructive pulmonary disease) (HCC)  Prednisone for few days to help with cough and wheezing    - budesonide-formoterol

## 2019-06-14 ENCOUNTER — OFFICE VISIT (OUTPATIENT)
Dept: GASTROENTEROLOGY | Age: 64
End: 2019-06-14
Payer: MEDICARE

## 2019-06-14 VITALS
BODY MASS INDEX: 20.6 KG/M2 | WEIGHT: 151.9 LBS | DIASTOLIC BLOOD PRESSURE: 80 MMHG | SYSTOLIC BLOOD PRESSURE: 112 MMHG | HEART RATE: 76 BPM

## 2019-06-14 DIAGNOSIS — D50.0 IRON DEFICIENCY ANEMIA DUE TO CHRONIC BLOOD LOSS: Primary | ICD-10-CM

## 2019-06-14 PROCEDURE — G8420 CALC BMI NORM PARAMETERS: HCPCS | Performed by: INTERNAL MEDICINE

## 2019-06-14 PROCEDURE — 3017F COLORECTAL CA SCREEN DOC REV: CPT | Performed by: INTERNAL MEDICINE

## 2019-06-14 PROCEDURE — 1036F TOBACCO NON-USER: CPT | Performed by: INTERNAL MEDICINE

## 2019-06-14 PROCEDURE — G8598 ASA/ANTIPLAT THER USED: HCPCS | Performed by: INTERNAL MEDICINE

## 2019-06-14 PROCEDURE — 99213 OFFICE O/P EST LOW 20 MIN: CPT | Performed by: INTERNAL MEDICINE

## 2019-06-14 PROCEDURE — G8427 DOCREV CUR MEDS BY ELIG CLIN: HCPCS | Performed by: INTERNAL MEDICINE

## 2019-06-14 RX ORDER — POLYETHYLENE GLYCOL 3350 17 G/17G
17 POWDER, FOR SOLUTION ORAL DAILY
Qty: 30 BOTTLE | Refills: 11 | COMMUNITY
Start: 2019-06-14 | End: 2019-07-14

## 2019-06-14 NOTE — PROGRESS NOTES
DIGESTIVE HEALTH PROGRESS NOTE    HISTORY OF PRESENT ILLNESS: Mr. Jamee Shirley is a 58 y.o. male who presents for follow up on Fe def anemia. This is attributed to AVMs. His numbers are much better. He recently received iron infusion. He reports constipation for the past month or so. Bowel movement every week or so. Bloating and gas. He has not been taking oral iron. Past Medical, Family, and Social History reviewed and does not contribute to the patient presenting condition. Patient's PMH/PSH,SH,PSYCH Hx, MEDs, ALLERGIES, and ROS were all reviewed and updated in the appropriate sections.     PAST MEDICAL HISTORY:  Past Medical History:   Diagnosis Date    Alcohol withdrawal seizure ([facility] [address] 1991    quit ETOH since 1991    Blood loss anemia 2008    transfusion from chronic plavix and asa use    CAD (coronary artery disease) 2006    angioplast with drug eluting stent to l circumflex     Cardiomyopathy ([facility] [address]     Chest pain     Chronic kidney disease     CKD (chronic kidney disease) 8/9/2016    COPD (chronic obstructive pulmonary disease) ([facility] [address] 08/2012    severe obstructive disease with bronchospasm on PFT    Cough     \"smoker\"    Erectile dysfunction     History of transfusion of packed red blood cells 2009    s/p prostate surgery    Hyperlipidemia     Hypertension     Nicotine dependence     Primary adenocarcinoma of prostate ([facility] [address] 2009    tuckre stage 6 s/p lap prostatectomy    Urinary incontinence     Wears dentures     upper and lower    Wears glasses        Past Surgical History:   Procedure Laterality Date    CARDIAC CATHETERIZATION  2006    drug eluting stent to LCX STENT X1    CARDIAC CATHETERIZATION  10/2015    STENT WAS PATENT    CARDIAC CATHETERIZATION  10/03/2018    STENT X1    COLONOSCOPY  2008    int hemorrhoids, tiny sigmoid polyp    COLONOSCOPY  09/19/2017    Normal    COLONOSCOPY  1/23/2018    COLONOSCOPY WITH BIOPSY performed by Sophie Robert MD at [facility]  ENDOSCOPY, COLON, DIAGNOSTIC      INGUINAL HERNIA REPAIR Right     SD COLON CA SCRN NOT HI RSK IND N/A 9/19/2017    COLONOSCOPY performed by Jayden Ashton MD at [address] ESOPHAGOGASTRODUODENOSCOPY TRANSORAL DIAGNOSTIC N/A 9/19/2017    EGD ESOPHAGOGASTRODUODENOSCOPY performed by Jayden Ashton MD at [address] [place] [address]  2009    adenocarcinoma tucker stage 6    UPPER GASTROINTESTINAL ENDOSCOPY  2008    normal    UPPER GASTROINTESTINAL ENDOSCOPY  09/19/2017    The cardia, fundus, incisura, antrum and pylorus were identified via direct visualization. The endoscopic exam showed 6 small to medium AVMs. Hemostasis was achieved by BiCap cautery. Two hemoclips were placed.       UPPER GASTROINTESTINAL ENDOSCOPY  1/23/2018    EGD BIOPSY performed by Kezia Contreras MD at [facility]       CURRENT MEDICATIONS:    Current Outpatient Medications:     atorvastatin (LIPITOR) 20 MG tablet, Take 1 tablet by mouth daily, Disp: 90 tablet, Rfl: 6    budesonide-formoterol (SYMBICORT) 80-4.5 MCG/ACT AERO, Inhale 2 puffs into the lungs 2 times daily, Disp: 2 Inhaler, Rfl: 5    albuterol sulfate HFA (PROAIR HFA) 108 (90 Base) MCG/ACT inhaler, Inhale 2 puffs into the lungs every 6 hours as needed for Wheezing, Disp: 1 Inhaler, Rfl: 3    docusate sodium (COLACE) 100 MG capsule, Take 1 capsule by mouth 2 times daily, Disp: 30 capsule, Rfl: 2    polyethylene glycol (GLYCOLAX) powder, Take 17 g by mouth daily, Disp: 1530 g, Rfl: 1    predniSONE (DELTASONE) 20 MG tablet, Take 2 tablets by mouth daily for 5 days, Disp: 5 tablet, Rfl: 0    isosorbide mononitrate (IMDUR) 30 MG extended release tablet, TAKE 1 TABLET BY MOUTH DAILY, Disp: 30 tablet, Rfl: 2    nitroGLYCERIN (NITROSTAT) 0.3 MG SL tablet, DISSOLVE 1 TABLET UNDER THE TONGUE EVERY 5 MINUTES AS NEEDED FOR CHEST PAIN UNTIL RELIEF IS OBTAINED, IF PAIN PERSISTS, CALL 911, Disp: 100 tablet, Rfl: 0    pantoprazole (PROTONIX) 40 MG tablet, Take 1 tablet by mouth daily, Disp: 90 tablet, Rfl: 3    tiotropium (SPIRIVA HANDIHALER) 18 MCG inhalation capsule, INHALE CONTENTS OF 1 CAPSULE INTO THE LUNGS ONCE DAILY, Disp: 30 capsule, Rfl: 5    amLODIPine (NORVASC) 2.5 MG tablet, Take 1 tablet by mouth daily, Disp: 90 tablet, Rfl: 2    ranolazine (RANEXA) 500 MG extended release tablet, Take 500 mg by mouth 2 times daily, Disp: , Rfl:     metoprolol tartrate (LOPRESSOR) 25 MG tablet, TAKE 1 TABLET BY MOUTH TWICE DAILY, Disp: 180 tablet, Rfl: 0    aspirin 81 MG chewable tablet, Take 1 tablet by mouth daily, Disp: 30 tablet, Rfl: 3    clopidogrel (PLAVIX) 75 MG tablet, Take 75 mg by mouth daily, Disp: , Rfl:     ALLERGIES:   Allergies   Allergen Reactions    Phenytoin Sodium Extended Rash       SOCIAL HISTORY:   Social History     Socioeconomic History    Marital status: Single     Spouse name: Not on file    Number of children: Not on file    Years of education: Not on file    Highest education level: Not on file   Occupational History    Not on file   Social Needs    Financial resource strain: Not on file    Food insecurity:     Worry: Not on file     Inability: Not on file    Transportation needs:     Medical: Not on file     Non-medical: Not on file   Tobacco Use    Smoking status: Former Smoker     Packs/day: 0.50     Years: 50.00     Pack years: 25.00     Types: Cigarettes     Last attempt to quit: 10/5/2018     Years since quittin.6    Smokeless tobacco: Never Used   Substance and Sexual Activity    Alcohol use: No     Alcohol/week: 0.0 oz     Comment: Recovered alcoholic, quit in 0601    Drug use: Not Currently     Frequency: 1.0 times per week     Types: Marijuana    Sexual activity: Not on file   Lifestyle    Physical activity:     Days per week: Not on file     Minutes per session: Not on file    Stress: Not on file   Relationships    Social connections:     Talks on phone: Not on file     Gets together: Not on file     Attends [employer] service: Not on file     Active member of club or organization: Not on file     Attends meetings of clubs or organizations: Not on file     Relationship status: Not on file    Intimate partner violence:     Fear of current or ex partner: Not on file     Emotionally abused: Not on file     Physically abused: Not on file     Forced sexual activity: Not on file   Other Topics Concern    Not on file   Social History Narrative    Not on file       REVIEW OF SYSTEMS: A 12-point review of systems was obtained and pertinent positives and negatives were enumerated above in the history of present illness. All other reviewed systems / symptoms were negative. Review of Systems     PHYSICAL EXAMINATION: Vital signs reviewed per the nursing documentation. Wt 151 lb 14.4 oz (68.9 kg)   BMI 20.60 kg/m²   Body mass index is 20.6 kg/m². I personally reviewed the nurse's notes and documentation and I agree with her notes. General: alert, appears stated age and cooperative Psych: Normal. and Alert and oriented, appropriate affect. . Normal affect. Mentation normal  HEENT: PERRLA. Clear conjunctivae and sclerae. Moist oral mucosae, no lesions or ulcers. The neck is supple, without lymphadenopathy or jugular venous distension. No masses. Normal thyroid. Cardiovascular: S1 S2 RRR no rubs or murmurs. Pulmonary: clear BL. No accessory muscle usage. Abdominal Exam: Soft, NT ND, no hepato or spleno megaly, +BS, no ascites.       LABORATORY DATA: Reviewed  Lab Results   Component Value Date    WBC 4.5 06/04/2019    HGB 12.7 (L) 06/04/2019    HCT 43.1 06/04/2019    MCV 93.1 06/04/2019     06/04/2019     06/04/2019    K 4.1 06/04/2019     06/04/2019    CO2 24 06/04/2019    BUN 10 06/04/2019    CREATININE 1.12 06/04/2019    LABALBU 4.3 06/04/2019    BILITOT 0.25 (L) 06/04/2019    ALKPHOS 70 06/04/2019    AST 20 06/04/2019    ALT 17 06/04/2019         Lab Results   Component Value Date    RBC 4.63 06/04/2019    HGB 12.7 (L) 06/04/2019    MCV 93.1 06/04/2019    MCH 27.4 06/04/2019    MCHC 29.5 06/04/2019    RDW 22.5 (H) 06/04/2019    MPV 10.2 06/04/2019    BASOPCT 1 06/04/2019    LYMPHSABS 1.44 06/04/2019    MONOSABS 0.50 06/04/2019    NEUTROABS 2.24 06/04/2019    EOSABS 0.27 06/04/2019    BASOSABS 0.05 06/04/2019         DIAGNOSTIC TESTING:   No results found. IMPRESSION: Mr. Divina Tai is a 58 y.o. male with constipation. Bloating. Iron deficiency anemia. Trial of MiraLAX. Titrate dose to effect. Follow-up in 2 months. Ksenia Curry MD [facility]      Please note that this chart was generated using voice recognition Dragon dictation software. Although every effort was made to ensure the accuracy of this automated transcription, some errors in transcription may have occurred.

## 2019-06-16 ASSESSMENT — ENCOUNTER SYMPTOMS
EYE PAIN: 0
COUGH: 1
SORE THROAT: 0

## 2019-06-20 RX ORDER — FAMOTIDINE 20 MG/1
TABLET, FILM COATED ORAL
Qty: 180 TABLET | Refills: 0 | Status: SHIPPED | OUTPATIENT
Start: 2019-06-20 | End: 2019-11-03 | Stop reason: SDUPTHER

## 2019-06-20 RX ORDER — NITROGLYCERIN 0.3 MG/1
TABLET SUBLINGUAL
Qty: 100 TABLET | Refills: 0 | Status: SHIPPED | OUTPATIENT
Start: 2019-06-20

## 2019-06-20 NOTE — TELEPHONE ENCOUNTER
Escribe request for nitroglycerin, famotidine    Next Visit Date:  Future Appointments   Date Time Provider Nando Stallings   7/5/2019 12:00 PM SCHEDULE, [facility]   7/11/2019  2:00 PM Annabel Vale MD [facility]   8/26/2019  2:45 PM Mohsen Cao MD [facility] [address] Maintenance   Topic Date Due    Hepatitis C screen  1955    HIV screen  10/12/1969    DTaP/Tdap/Td vaccine (1 - Tdap) 10/12/1973    Shingles Vaccine (1 of 2) 10/12/2004    Annual Wellness Visit (AWV)  10/12/2017    A1C test (Diabetic or Prediabetic)  06/04/2020    Lipid screen  06/04/2024    Colon cancer screen colonoscopy  01/23/2028    Flu vaccine  Completed    Pneumococcal 0-64 years Vaccine  Completed             (applicable per patient's age: Cancer Screenings, Depression Screening, Fall Risk Screening, Immunizations)    Hemoglobin A1C (%)   Date Value   06/04/2019 4.7   07/24/2018 5.9   01/30/2018 5.0     Microalb/Crt.  Ratio (mcg/mg creat)   Date Value   07/27/2018 CANNOT BE CALCULATED     LDL Cholesterol (mg/dL)   Date Value   06/04/2019 61     AST (U/L)   Date Value   06/04/2019 20     ALT (U/L)   Date Value   06/04/2019 17     BUN (mg/dL)   Date Value   06/04/2019 10      (goal A1C is < 7)   (goal LDL is <100) need 30-50% reduction from baseline     BP Readings from Last 3 Encounters:   06/14/19 112/80   06/13/19 108/77   05/09/19 110/72    (goal /80)      All Future Testing planned in [facility]:  Lab Frequency Next Occurrence   Full PFT Study With Bronchodilator Once 06/13/2019   Transfuse RBC     CBC Auto Differential     Ferritin     Iron And TIBC              Patient Active Problem List:     COPD, moderate     HTN (hypertension)     Hyperlipemia     Smoker     mild Cardiomyopathy-ischemic ejection fraction 40-45%      Coronary artery disease involving native coronary artery of native heart with angina pectoris ([address]     recovered Alcoholic quit in 0326 Closed head injury with right temporal parenchymal bleed in 99. Prostate cancer adenocarcinoma. [place] stage 6 staus post laparoscopic prostatectomy 2009.      History of right  inguinal hernia repair     Transfusion history--2008 and 2009      COPD (chronic obstructive pulmonary disease) (HCC)     Erectile dysfunction     Personal history of malignant neoplasm of prostate     Hyperlipidemia     Alcohol dependence in remission ([facility] [address]     CKD (chronic kidney disease)     Prediabetes     History of colon polyps     Anemia     Iron deficiency anemia due to chronic blood loss     CAD in native artery     Iron deficiency anemia secondary to inadequate dietary iron intake     Malabsorption

## 2019-07-05 ENCOUNTER — HOSPITAL ENCOUNTER (OUTPATIENT)
Facility: MEDICAL CENTER | Age: 64
Discharge: HOME | End: 2019-07-05
Payer: MEDICARE

## 2019-07-05 DIAGNOSIS — D50.0 IRON DEFICIENCY ANEMIA DUE TO CHRONIC BLOOD LOSS: ICD-10-CM

## 2019-07-05 LAB
ABSOLUTE EOS #: 0.17 K/UL (ref 0–0.44)
ABSOLUTE IMMATURE GRANULOCYTE: 0.01 K/UL (ref 0–0.3)
ABSOLUTE LYMPH #: 1.45 K/UL (ref 1.1–3.7)
ABSOLUTE MONO #: 0.4 K/UL (ref 0.1–1.2)
BASOPHILS # BLD: 1 % (ref 0–2)
BASOPHILS ABSOLUTE: <0.03 K/UL (ref 0–0.2)
DIFFERENTIAL TYPE: ABNORMAL
EOSINOPHILS RELATIVE PERCENT: 4 % (ref 1–4)
FERRITIN: 150 UG/L (ref 30–400)
HCT VFR BLD CALC: 43.2 % (ref 40.7–50.3)
HEMOGLOBIN: 13.3 G/DL (ref 13–17)
IMMATURE GRANULOCYTES: 0 %
IRON SATURATION: 31 % (ref 20–55)
IRON: 72 UG/DL (ref 59–158)
LYMPHOCYTES # BLD: 34 % (ref 24–43)
MCH RBC QN AUTO: 28.1 PG (ref 25.2–33.5)
MCHC RBC AUTO-ENTMCNC: 30.8 G/DL (ref 28.4–34.8)
MCV RBC AUTO: 91.3 FL (ref 82.6–102.9)
MONOCYTES # BLD: 9 % (ref 3–12)
NRBC AUTOMATED: 0 PER 100 WBC
PDW BLD-RTO: 18.3 % (ref 11.8–14.4)
PLATELET # BLD: 274 K/UL (ref 138–453)
PLATELET ESTIMATE: ABNORMAL
PMV BLD AUTO: 8.9 FL (ref 8.1–13.5)
RBC # BLD: 4.73 M/UL (ref 4.21–5.77)
RBC # BLD: ABNORMAL 10*6/UL
SEG NEUTROPHILS: 52 % (ref 36–65)
SEGMENTED NEUTROPHILS ABSOLUTE COUNT: 2.2 K/UL (ref 1.5–8.1)
TOTAL IRON BINDING CAPACITY: 235 UG/DL (ref 250–450)
UNSATURATED IRON BINDING CAPACITY: 163 UG/DL (ref 112–347)
WBC # BLD: 4.3 K/UL (ref 3.5–11.3)
WBC # BLD: ABNORMAL 10*3/UL

## 2019-07-05 PROCEDURE — 83550 IRON BINDING TEST: CPT

## 2019-07-05 PROCEDURE — 36415 COLL VENOUS BLD VENIPUNCTURE: CPT

## 2019-07-05 PROCEDURE — 85025 COMPLETE CBC W/AUTO DIFF WBC: CPT

## 2019-07-05 PROCEDURE — 82728 ASSAY OF FERRITIN: CPT

## 2019-07-05 PROCEDURE — 83540 ASSAY OF IRON: CPT

## 2019-07-09 ENCOUNTER — HOSPITAL ENCOUNTER (OUTPATIENT)
Facility: MEDICAL CENTER | Age: 64
End: 2019-07-09
Payer: MEDICARE

## 2019-07-11 ENCOUNTER — OFFICE VISIT (OUTPATIENT)
Dept: ONCOLOGY | Age: 64
End: 2019-07-11
Payer: MEDICARE

## 2019-07-11 ENCOUNTER — TELEPHONE (OUTPATIENT)
Dept: ONCOLOGY | Age: 64
End: 2019-07-11

## 2019-07-11 VITALS
TEMPERATURE: 97.9 F | HEART RATE: 79 BPM | DIASTOLIC BLOOD PRESSURE: 77 MMHG | WEIGHT: 147.5 LBS | BODY MASS INDEX: 20 KG/M2 | SYSTOLIC BLOOD PRESSURE: 112 MMHG

## 2019-07-11 DIAGNOSIS — F10.21 ALCOHOL DEPENDENCE IN REMISSION (HCC): ICD-10-CM

## 2019-07-11 DIAGNOSIS — D50.8 IRON DEFICIENCY ANEMIA SECONDARY TO INADEQUATE DIETARY IRON INTAKE: ICD-10-CM

## 2019-07-11 DIAGNOSIS — D50.0 IRON DEFICIENCY ANEMIA DUE TO CHRONIC BLOOD LOSS: Primary | ICD-10-CM

## 2019-07-11 DIAGNOSIS — F17.200 SMOKER: ICD-10-CM

## 2019-07-11 PROCEDURE — 1036F TOBACCO NON-USER: CPT | Performed by: INTERNAL MEDICINE

## 2019-07-11 PROCEDURE — 99214 OFFICE O/P EST MOD 30 MIN: CPT | Performed by: INTERNAL MEDICINE

## 2019-07-11 PROCEDURE — G8427 DOCREV CUR MEDS BY ELIG CLIN: HCPCS | Performed by: INTERNAL MEDICINE

## 2019-07-11 PROCEDURE — G8420 CALC BMI NORM PARAMETERS: HCPCS | Performed by: INTERNAL MEDICINE

## 2019-07-11 PROCEDURE — 99211 OFF/OP EST MAY X REQ PHY/QHP: CPT | Performed by: INTERNAL MEDICINE

## 2019-07-11 PROCEDURE — 3017F COLORECTAL CA SCREEN DOC REV: CPT | Performed by: INTERNAL MEDICINE

## 2019-07-11 PROCEDURE — G8598 ASA/ANTIPLAT THER USED: HCPCS | Performed by: INTERNAL MEDICINE

## 2019-07-11 RX ORDER — PANTOPRAZOLE SODIUM 40 MG/1
40 TABLET, DELAYED RELEASE ORAL DAILY
Qty: 90 TABLET | Refills: 3 | Status: SHIPPED | OUTPATIENT
Start: 2019-07-11 | End: 2020-07-09 | Stop reason: SDUPTHER

## 2019-07-11 NOTE — PROGRESS NOTES
No chest pain, orthopnea or PND. No lower extremity edema. No palpitation. · Gastrointestinal: As above. · Genitourinary: No dysuria, hematuria, frequency or urgency. · Musculoskeletal: No muscle aches or pains. No limitation of movement. No back pain. No gait disturbance, No joint complaints. · Dermatologic: No skin rashes or pruritus. No skin lesions or discolorations. · Psychiatric: No depression, anxiety, or stress or signs of schizophrenia. No change in mood or affect. · Hematologic: No history of bleeding tendency. No bruises or ecchymosis. No history of clotting problems. · Infectious disease: No fever, chills or frequent infections. · Endocrine: No problems with opacity. No polydipsia or polyuria. No temperature intolerance. · Neurologic: No headaches or dizziness. No weakness or numbness of the extremities. No changes in balance, coordination,  memory, mentation, behavior. · Allergic/Immunologic: No nasal congestion or hives. No repeated infections. PHYSICAL EXAM:  The patient is not in acute distress. Vital signs: Blood pressure 112/77, pulse 79, temperature 97.9 °F (36.6 °C), temperature source Oral, weight 147 lb 8 oz (66.9 kg). HEENT:  Eyes are normal. Ears, nose and throat are normal.  Neck: Supple. No lymph node enlargement. No thyroid enlargement. Trachea is centrally located. Chest:  Clear to auscultation. No wheezes or crepitations. Heart: Regular sinus rhythm. Abdomen: Soft, nontender. No hepatosplenomegaly. No masses. Extremities:  With no edema. Lymph Nodes:  No cervical, axillary or inguinal lymph node enlargement. Neurologic:  Conscious and oriented. No focal neurological deficits. Psychosocial: No depression, anxiety or stress. Skin: No rashes, bruises or ecchymoses.       Review of Diagnostic data:   Recent Labs     07/05/19  0827   WBC 4.3   HGB 13.3   HCT 43.2   MCV 91.3        Lab Results   Component Value Date    IRON 72 07/05/2019    TIBC

## 2019-07-18 DIAGNOSIS — R07.9 CHEST PAIN, UNSPECIFIED TYPE: ICD-10-CM

## 2019-07-18 RX ORDER — ISOSORBIDE MONONITRATE 30 MG/1
30 TABLET, EXTENDED RELEASE ORAL DAILY
Qty: 30 TABLET | Refills: 3 | Status: SHIPPED | OUTPATIENT
Start: 2019-07-18 | End: 2019-11-22 | Stop reason: SDUPTHER

## 2019-08-23 ENCOUNTER — TELEPHONE (OUTPATIENT)
Dept: GASTROENTEROLOGY | Age: 64
End: 2019-08-23

## 2019-09-17 DIAGNOSIS — J44.9 MODERATE COPD (CHRONIC OBSTRUCTIVE PULMONARY DISEASE) (HCC): ICD-10-CM

## 2019-09-17 NOTE — TELEPHONE ENCOUNTER
Request for Spiriva. Next Visit Date:  Future Appointments   Date Time Provider Nando Haylie   10/17/2019 10:45 AM SCHEDULE, [facility]ER [facility]   10/24/2019  1:20 PM Merari Fontanez MD [address] [facility] [employer] Maintenance   Topic Date Due    Hepatitis C screen  1955    HIV screen  10/12/1969    DTaP/Tdap/Td vaccine (1 - Tdap) 10/12/1973    Shingles Vaccine (1 of 2) 10/12/2004    Annual Wellness Visit (AWV)  05/29/2019    Flu vaccine (1) 09/01/2019    A1C test (Diabetic or Prediabetic)  06/04/2020    Lipid screen  06/04/2024    Colon cancer screen colonoscopy  01/23/2028    Pneumococcal 0-64 years Vaccine  Completed       Hemoglobin A1C (%)   Date Value   06/04/2019 4.7   07/24/2018 5.9   01/30/2018 5.0             ( goal A1C is < 7)   Microalb/Crt. Ratio (mcg/mg creat)   Date Value   07/27/2018 CANNOT BE CALCULATED     LDL Cholesterol (mg/dL)   Date Value   06/04/2019 61       (goal LDL is <100)   AST (U/L)   Date Value   06/04/2019 20     ALT (U/L)   Date Value   06/04/2019 17     BUN (mg/dL)   Date Value   06/04/2019 10     BP Readings from Last 3 Encounters:   07/11/19 112/77   06/14/19 112/80   06/13/19 108/77          (goal 120/80)    All Future Testing planned in [facility]  Lab Frequency Next Occurrence   Full PFT Study With Bronchodilator Once 06/13/2019   Transfuse RBC     CBC Auto Differential     Ferritin     Iron And TIBC           Patient Active Problem List:     COPD, moderate     HTN (hypertension)     Hyperlipemia     Smoker     mild Cardiomyopathy-ischemic ejection fraction 40-45%      Coronary artery disease involving native coronary artery of native heart with angina pectoris ([facility] [address]     recovered Alcoholic quit in 0051     Closed head injury with right temporal parenchymal bleed in 99. Prostate cancer adenocarcinoma. [place] stage 6 staus post laparoscopic prostatectomy 2009.      History of right  inguinal hernia repair     Transfusion

## 2019-09-23 ENCOUNTER — HOSPITAL ENCOUNTER (OUTPATIENT)
Dept: PULMONOLOGY | Age: 64
Discharge: HOME | End: 2019-09-23
Payer: MEDICARE

## 2019-09-23 DIAGNOSIS — J44.9 MODERATE COPD (CHRONIC OBSTRUCTIVE PULMONARY DISEASE) (HCC): ICD-10-CM

## 2019-09-23 PROCEDURE — 94640 AIRWAY INHALATION TREATMENT: CPT

## 2019-09-23 PROCEDURE — 94726 PLETHYSMOGRAPHY LUNG VOLUMES: CPT

## 2019-09-23 PROCEDURE — 94664 DEMO&/EVAL PT USE INHALER: CPT

## 2019-09-23 PROCEDURE — 94729 DIFFUSING CAPACITY: CPT

## 2019-09-23 PROCEDURE — 94060 EVALUATION OF WHEEZING: CPT

## 2019-09-23 PROCEDURE — 94727 GAS DIL/WSHOT DETER LNG VOL: CPT

## 2019-10-09 DIAGNOSIS — J44.9 MODERATE COPD (CHRONIC OBSTRUCTIVE PULMONARY DISEASE) (HCC): ICD-10-CM

## 2019-10-09 DIAGNOSIS — I25.10 CAD S/P PERCUTANEOUS CORONARY ANGIOPLASTY: ICD-10-CM

## 2019-10-09 DIAGNOSIS — Z98.61 CAD S/P PERCUTANEOUS CORONARY ANGIOPLASTY: ICD-10-CM

## 2019-10-09 DIAGNOSIS — I25.5 ISCHEMIC CARDIOMYOPATHY: ICD-10-CM

## 2019-10-09 DIAGNOSIS — I10 ESSENTIAL HYPERTENSION: ICD-10-CM

## 2019-10-15 ENCOUNTER — CARE COORDINATION (OUTPATIENT)
Dept: CARE COORDINATION | Age: 64
End: 2019-10-15

## 2019-10-17 ENCOUNTER — HOSPITAL ENCOUNTER (OUTPATIENT)
Facility: MEDICAL CENTER | Age: 64
Discharge: HOME | End: 2019-10-17
Payer: MEDICARE

## 2019-10-17 DIAGNOSIS — D50.0 IRON DEFICIENCY ANEMIA DUE TO CHRONIC BLOOD LOSS: ICD-10-CM

## 2019-10-17 LAB
ABSOLUTE EOS #: 0.28 K/UL (ref 0–0.44)
ABSOLUTE IMMATURE GRANULOCYTE: 0.02 K/UL (ref 0–0.3)
ABSOLUTE LYMPH #: 1.96 K/UL (ref 1.1–3.7)
ABSOLUTE MONO #: 0.53 K/UL (ref 0.1–1.2)
BASOPHILS # BLD: 1 % (ref 0–2)
BASOPHILS ABSOLUTE: 0.03 K/UL (ref 0–0.2)
DIFFERENTIAL TYPE: ABNORMAL
EOSINOPHILS RELATIVE PERCENT: 5 % (ref 1–4)
FERRITIN: 73 UG/L (ref 30–400)
HCT VFR BLD CALC: 44 % (ref 40.7–50.3)
HEMOGLOBIN: 13.3 G/DL (ref 13–17)
IMMATURE GRANULOCYTES: 0 %
IRON SATURATION: 41 % (ref 20–55)
IRON: 109 UG/DL (ref 59–158)
LYMPHOCYTES # BLD: 36 % (ref 24–43)
MCH RBC QN AUTO: 29.4 PG (ref 25.2–33.5)
MCHC RBC AUTO-ENTMCNC: 30.2 G/DL (ref 28–38)
MCV RBC AUTO: 97.3 FL (ref 82.6–102.9)
MONOCYTES # BLD: 10 % (ref 3–12)
NRBC AUTOMATED: ABNORMAL PER 100 WBC
PDW BLD-RTO: 13.9 % (ref 11.8–14.4)
PLATELET # BLD: 311 K/UL (ref 138–453)
PLATELET ESTIMATE: ABNORMAL
PMV BLD AUTO: 8.8 FL (ref 8.1–13.5)
RBC # BLD: 4.52 M/UL (ref 4.21–5.77)
RBC # BLD: ABNORMAL 10*6/UL
SEG NEUTROPHILS: 48 % (ref 36–65)
SEGMENTED NEUTROPHILS ABSOLUTE COUNT: 2.57 K/UL (ref 1.5–8.1)
TOTAL IRON BINDING CAPACITY: 267 UG/DL (ref 250–450)
UNSATURATED IRON BINDING CAPACITY: 158 UG/DL (ref 112–347)
WBC # BLD: 5.4 K/UL (ref 3.5–11.3)
WBC # BLD: ABNORMAL 10*3/UL

## 2019-10-17 PROCEDURE — 83540 ASSAY OF IRON: CPT

## 2019-10-17 PROCEDURE — 85025 COMPLETE CBC W/AUTO DIFF WBC: CPT

## 2019-10-17 PROCEDURE — 83550 IRON BINDING TEST: CPT

## 2019-10-17 PROCEDURE — 36415 COLL VENOUS BLD VENIPUNCTURE: CPT

## 2019-10-17 PROCEDURE — 82728 ASSAY OF FERRITIN: CPT

## 2019-10-22 ENCOUNTER — HOSPITAL ENCOUNTER (OUTPATIENT)
Facility: MEDICAL CENTER | Age: 64
End: 2019-10-22
Payer: MEDICARE

## 2019-10-24 ENCOUNTER — OFFICE VISIT (OUTPATIENT)
Dept: ONCOLOGY | Age: 64
End: 2019-10-24
Payer: MEDICARE

## 2019-10-24 ENCOUNTER — CARE COORDINATION (OUTPATIENT)
Dept: CARE COORDINATION | Age: 64
End: 2019-10-24

## 2019-10-24 ENCOUNTER — TELEPHONE (OUTPATIENT)
Dept: ONCOLOGY | Age: 64
End: 2019-10-24

## 2019-10-24 VITALS
WEIGHT: 153.1 LBS | RESPIRATION RATE: 18 BRPM | TEMPERATURE: 97.2 F | BODY MASS INDEX: 20.76 KG/M2 | DIASTOLIC BLOOD PRESSURE: 78 MMHG | SYSTOLIC BLOOD PRESSURE: 116 MMHG | HEART RATE: 77 BPM

## 2019-10-24 DIAGNOSIS — D50.0 IRON DEFICIENCY ANEMIA DUE TO CHRONIC BLOOD LOSS: Primary | ICD-10-CM

## 2019-10-24 DIAGNOSIS — D50.8 IRON DEFICIENCY ANEMIA SECONDARY TO INADEQUATE DIETARY IRON INTAKE: ICD-10-CM

## 2019-10-24 DIAGNOSIS — K90.89 OTHER SPECIFIED INTESTINAL MALABSORPTION: ICD-10-CM

## 2019-10-24 PROCEDURE — 99211 OFF/OP EST MAY X REQ PHY/QHP: CPT | Performed by: INTERNAL MEDICINE

## 2019-10-24 PROCEDURE — 1036F TOBACCO NON-USER: CPT | Performed by: INTERNAL MEDICINE

## 2019-10-24 PROCEDURE — G8427 DOCREV CUR MEDS BY ELIG CLIN: HCPCS | Performed by: INTERNAL MEDICINE

## 2019-10-24 PROCEDURE — G8420 CALC BMI NORM PARAMETERS: HCPCS | Performed by: INTERNAL MEDICINE

## 2019-10-24 PROCEDURE — G8598 ASA/ANTIPLAT THER USED: HCPCS | Performed by: INTERNAL MEDICINE

## 2019-10-24 PROCEDURE — G8484 FLU IMMUNIZE NO ADMIN: HCPCS | Performed by: INTERNAL MEDICINE

## 2019-10-24 PROCEDURE — 1123F ACP DISCUSS/DSCN MKR DOCD: CPT | Performed by: INTERNAL MEDICINE

## 2019-10-24 PROCEDURE — 3017F COLORECTAL CA SCREEN DOC REV: CPT | Performed by: INTERNAL MEDICINE

## 2019-10-24 PROCEDURE — 4040F PNEUMOC VAC/ADMIN/RCVD: CPT | Performed by: INTERNAL MEDICINE

## 2019-10-24 PROCEDURE — 99214 OFFICE O/P EST MOD 30 MIN: CPT | Performed by: INTERNAL MEDICINE

## 2019-10-28 ENCOUNTER — OFFICE VISIT (OUTPATIENT)
Dept: GASTROENTEROLOGY | Age: 64
End: 2019-10-28
Payer: MEDICARE

## 2019-10-28 VITALS
BODY MASS INDEX: 20.89 KG/M2 | SYSTOLIC BLOOD PRESSURE: 104 MMHG | HEART RATE: 90 BPM | WEIGHT: 154 LBS | DIASTOLIC BLOOD PRESSURE: 81 MMHG

## 2019-10-28 DIAGNOSIS — K59.00 CONSTIPATION, UNSPECIFIED CONSTIPATION TYPE: Primary | ICD-10-CM

## 2019-10-28 PROCEDURE — 3017F COLORECTAL CA SCREEN DOC REV: CPT | Performed by: INTERNAL MEDICINE

## 2019-10-28 PROCEDURE — 1123F ACP DISCUSS/DSCN MKR DOCD: CPT | Performed by: INTERNAL MEDICINE

## 2019-10-28 PROCEDURE — G8598 ASA/ANTIPLAT THER USED: HCPCS | Performed by: INTERNAL MEDICINE

## 2019-10-28 PROCEDURE — G8427 DOCREV CUR MEDS BY ELIG CLIN: HCPCS | Performed by: INTERNAL MEDICINE

## 2019-10-28 PROCEDURE — 99213 OFFICE O/P EST LOW 20 MIN: CPT | Performed by: INTERNAL MEDICINE

## 2019-10-28 PROCEDURE — 4040F PNEUMOC VAC/ADMIN/RCVD: CPT | Performed by: INTERNAL MEDICINE

## 2019-10-28 PROCEDURE — G8484 FLU IMMUNIZE NO ADMIN: HCPCS | Performed by: INTERNAL MEDICINE

## 2019-10-28 PROCEDURE — G8420 CALC BMI NORM PARAMETERS: HCPCS | Performed by: INTERNAL MEDICINE

## 2019-10-28 PROCEDURE — 1036F TOBACCO NON-USER: CPT | Performed by: INTERNAL MEDICINE

## 2019-10-28 RX ORDER — DOCUSATE SODIUM 100 MG/1
100 CAPSULE, LIQUID FILLED ORAL 2 TIMES DAILY
Qty: 60 CAPSULE | Refills: 11 | Status: SHIPPED | OUTPATIENT
Start: 2019-10-28 | End: 2019-11-27

## 2019-11-04 RX ORDER — FAMOTIDINE 20 MG/1
TABLET, FILM COATED ORAL
Qty: 180 TABLET | Refills: 0 | Status: SHIPPED | OUTPATIENT
Start: 2019-11-04 | End: 2019-11-22 | Stop reason: SDUPTHER

## 2019-11-10 DIAGNOSIS — J44.9 MODERATE COPD (CHRONIC OBSTRUCTIVE PULMONARY DISEASE) (HCC): ICD-10-CM

## 2019-11-22 ENCOUNTER — OFFICE VISIT (OUTPATIENT)
Dept: INTERNAL MEDICINE | Age: 64
End: 2019-11-22
Payer: MEDICARE

## 2019-11-22 VITALS
SYSTOLIC BLOOD PRESSURE: 112 MMHG | HEIGHT: 72 IN | BODY MASS INDEX: 21.13 KG/M2 | HEART RATE: 76 BPM | WEIGHT: 156 LBS | DIASTOLIC BLOOD PRESSURE: 77 MMHG

## 2019-11-22 DIAGNOSIS — N18.30 STAGE 3 CHRONIC KIDNEY DISEASE (HCC): ICD-10-CM

## 2019-11-22 DIAGNOSIS — Z11.4 SCREENING FOR HIV WITHOUT PRESENCE OF RISK FACTORS: ICD-10-CM

## 2019-11-22 DIAGNOSIS — Z23 NEED FOR VACCINATION WITH 13-POLYVALENT PNEUMOCOCCAL CONJUGATE VACCINE: ICD-10-CM

## 2019-11-22 DIAGNOSIS — R14.0 ABDOMINAL BLOATING: ICD-10-CM

## 2019-11-22 DIAGNOSIS — Z98.61 CAD S/P PERCUTANEOUS CORONARY ANGIOPLASTY: ICD-10-CM

## 2019-11-22 DIAGNOSIS — Z11.59 ENCOUNTER FOR HEPATITIS C SCREENING TEST FOR LOW RISK PATIENT: ICD-10-CM

## 2019-11-22 DIAGNOSIS — Z23 NEEDS FLU SHOT: ICD-10-CM

## 2019-11-22 DIAGNOSIS — Z23 NEED FOR TDAP VACCINATION: ICD-10-CM

## 2019-11-22 DIAGNOSIS — Z23 NEED FOR SHINGLES VACCINE: ICD-10-CM

## 2019-11-22 DIAGNOSIS — I25.10 CAD S/P PERCUTANEOUS CORONARY ANGIOPLASTY: ICD-10-CM

## 2019-11-22 DIAGNOSIS — I25.5 ISCHEMIC CARDIOMYOPATHY: ICD-10-CM

## 2019-11-22 DIAGNOSIS — R73.03 PREDIABETES: ICD-10-CM

## 2019-11-22 DIAGNOSIS — I10 ESSENTIAL HYPERTENSION: Primary | ICD-10-CM

## 2019-11-22 DIAGNOSIS — D50.0 IRON DEFICIENCY ANEMIA DUE TO CHRONIC BLOOD LOSS: ICD-10-CM

## 2019-11-22 DIAGNOSIS — E78.00 PURE HYPERCHOLESTEROLEMIA: ICD-10-CM

## 2019-11-22 DIAGNOSIS — J44.9 STAGE 3 SEVERE COPD BY GOLD CLASSIFICATION (HCC): ICD-10-CM

## 2019-11-22 PROBLEM — D64.9 ANEMIA: Status: RESOLVED | Noted: 2017-08-10 | Resolved: 2019-11-22

## 2019-11-22 PROCEDURE — G8482 FLU IMMUNIZE ORDER/ADMIN: HCPCS | Performed by: INTERNAL MEDICINE

## 2019-11-22 PROCEDURE — 99211 OFF/OP EST MAY X REQ PHY/QHP: CPT | Performed by: INTERNAL MEDICINE

## 2019-11-22 PROCEDURE — 1123F ACP DISCUSS/DSCN MKR DOCD: CPT | Performed by: INTERNAL MEDICINE

## 2019-11-22 PROCEDURE — G8427 DOCREV CUR MEDS BY ELIG CLIN: HCPCS | Performed by: INTERNAL MEDICINE

## 2019-11-22 PROCEDURE — G8598 ASA/ANTIPLAT THER USED: HCPCS | Performed by: INTERNAL MEDICINE

## 2019-11-22 PROCEDURE — 4040F PNEUMOC VAC/ADMIN/RCVD: CPT | Performed by: INTERNAL MEDICINE

## 2019-11-22 PROCEDURE — G8420 CALC BMI NORM PARAMETERS: HCPCS | Performed by: INTERNAL MEDICINE

## 2019-11-22 PROCEDURE — 3023F SPIROM DOC REV: CPT | Performed by: INTERNAL MEDICINE

## 2019-11-22 PROCEDURE — 1036F TOBACCO NON-USER: CPT | Performed by: INTERNAL MEDICINE

## 2019-11-22 PROCEDURE — G8926 SPIRO NO PERF OR DOC: HCPCS | Performed by: INTERNAL MEDICINE

## 2019-11-22 PROCEDURE — 99214 OFFICE O/P EST MOD 30 MIN: CPT | Performed by: INTERNAL MEDICINE

## 2019-11-22 PROCEDURE — 3017F COLORECTAL CA SCREEN DOC REV: CPT | Performed by: INTERNAL MEDICINE

## 2019-11-22 PROCEDURE — 90670 PCV13 VACCINE IM: CPT | Performed by: INTERNAL MEDICINE

## 2019-11-22 PROCEDURE — 90688 IIV4 VACCINE SPLT 0.5 ML IM: CPT | Performed by: INTERNAL MEDICINE

## 2019-11-22 RX ORDER — ISOSORBIDE MONONITRATE 30 MG/1
30 TABLET, EXTENDED RELEASE ORAL DAILY
Qty: 30 TABLET | Refills: 1 | Status: SHIPPED | OUTPATIENT
Start: 2019-11-22 | End: 2020-01-14 | Stop reason: SDUPTHER

## 2019-11-22 RX ORDER — CLOPIDOGREL BISULFATE 75 MG/1
75 TABLET ORAL DAILY
Qty: 90 TABLET | Refills: 1 | Status: SHIPPED | OUTPATIENT
Start: 2019-11-22 | End: 2020-05-12 | Stop reason: SDUPTHER

## 2019-11-22 RX ORDER — AMLODIPINE BESYLATE 2.5 MG/1
2.5 TABLET ORAL DAILY
Qty: 90 TABLET | Refills: 1 | Status: SHIPPED | OUTPATIENT
Start: 2019-11-22 | End: 2020-05-06 | Stop reason: SDUPTHER

## 2019-11-22 RX ORDER — ATORVASTATIN CALCIUM 40 MG/1
40 TABLET, FILM COATED ORAL NIGHTLY
Qty: 90 TABLET | Refills: 0 | Status: SHIPPED | OUTPATIENT
Start: 2019-11-22 | End: 2020-05-12 | Stop reason: SDUPTHER

## 2019-11-22 RX ORDER — FAMOTIDINE 20 MG/1
20 TABLET, FILM COATED ORAL 2 TIMES DAILY
Qty: 180 TABLET | Refills: 1 | Status: SHIPPED | OUTPATIENT
Start: 2019-11-22 | End: 2019-11-22

## 2019-11-22 RX ORDER — BUDESONIDE AND FORMOTEROL FUMARATE DIHYDRATE 80; 4.5 UG/1; UG/1
2 AEROSOL RESPIRATORY (INHALATION) 2 TIMES DAILY
Qty: 2 INHALER | Refills: 5 | Status: SHIPPED | OUTPATIENT
Start: 2019-11-22 | End: 2020-05-12 | Stop reason: SDUPTHER

## 2019-11-22 RX ORDER — ATORVASTATIN CALCIUM 40 MG/1
40 TABLET, FILM COATED ORAL NIGHTLY
Qty: 90 TABLET | Refills: 0 | Status: SHIPPED | OUTPATIENT
Start: 2019-11-22 | End: 2019-11-22 | Stop reason: SDUPTHER

## 2020-01-06 ENCOUNTER — HOSPITAL ENCOUNTER (OUTPATIENT)
Age: 65
Discharge: HOME | End: 2020-01-06
Payer: MEDICARE

## 2020-01-06 ENCOUNTER — HOSPITAL ENCOUNTER (OUTPATIENT)
Dept: GENERAL RADIOLOGY | Age: 65
Discharge: HOME | End: 2020-01-08
Payer: MEDICARE

## 2020-01-06 ENCOUNTER — HOSPITAL ENCOUNTER (OUTPATIENT)
Age: 65
Discharge: HOME | End: 2020-01-08
Payer: MEDICARE

## 2020-01-06 LAB
HEPATITIS C ANTIBODY: NONREACTIVE
HIV AG/AB: NONREACTIVE

## 2020-01-06 PROCEDURE — 86803 HEPATITIS C AB TEST: CPT

## 2020-01-06 PROCEDURE — 87389 HIV-1 AG W/HIV-1&-2 AB AG IA: CPT

## 2020-01-06 PROCEDURE — 74018 RADEX ABDOMEN 1 VIEW: CPT

## 2020-01-06 PROCEDURE — 36415 COLL VENOUS BLD VENIPUNCTURE: CPT

## 2020-01-14 RX ORDER — ISOSORBIDE MONONITRATE 30 MG/1
30 TABLET, EXTENDED RELEASE ORAL DAILY
Qty: 30 TABLET | Refills: 1 | Status: SHIPPED | OUTPATIENT
Start: 2020-01-14 | End: 2020-03-04 | Stop reason: SDUPTHER

## 2020-01-14 NOTE — TELEPHONE ENCOUNTER
Request for imdur - medication pended. Please fill if appropriate. Next Visit Date:  No future appointments. Health Maintenance   Topic Date Due    DTaP/Tdap/Td vaccine (1 - Tdap) 10/12/1965    Shingles Vaccine (1 of 2) 10/12/2004    Annual Wellness Visit (AWV)  05/29/2019    A1C test (Diabetic or Prediabetic)  06/04/2020    Lipid screen  06/04/2020    Pneumococcal 65+ years Vaccine (2 of 2 - PPSV23) 11/22/2020    Colon cancer screen colonoscopy  01/23/2028    Flu vaccine  Completed    AAA screen  Completed    Hepatitis C screen  Completed    HIV screen  Completed       Hemoglobin A1C (%)   Date Value   06/04/2019 4.7   07/24/2018 5.9   01/30/2018 5.0             ( goal A1C is < 7)   Microalb/Crt. Ratio (mcg/mg creat)   Date Value   07/27/2018 CANNOT BE CALCULATED     LDL Cholesterol (mg/dL)   Date Value   06/04/2019 61       (goal LDL is <100)   AST (U/L)   Date Value   06/04/2019 20     ALT (U/L)   Date Value   06/04/2019 17     BUN (mg/dL)   Date Value   06/04/2019 10     BP Readings from Last 3 Encounters:   11/22/19 112/77   10/28/19 104/81   10/24/19 116/78          (goal 120/80)    All Future Testing planned in [facility]  Lab Frequency Next Occurrence   Transfuse RBC     CBC Auto Differential     Ferritin     Iron And TIBC           Patient Active Problem List:     HTN (hypertension)     Hyperlipemia     mild Cardiomyopathy-ischemic ejection fraction 40-45%      Coronary artery disease involving native coronary artery of native heart with angina pectoris (HCC)     Closed head injury with right temporal parenchymal bleed in 99. Prostate cancer adenocarcinoma. Bess stage 6 staus post laparoscopic prostatectomy 2009.      History of right  inguinal hernia repair     Stage 3 severe COPD by GOLD classification ([address]     Erectile dysfunction     Personal history of malignant neoplasm of prostate     Hyperlipidemia     CKD (chronic kidney disease)     Prediabetes     History of colon polyps Iron deficiency anemia due to chronic blood loss     CAD in native artery     Iron deficiency anemia secondary to inadequate dietary iron intake     Malabsorption

## 2020-02-06 NOTE — TELEPHONE ENCOUNTER
Request for spiriva - medication pended. Please fill if appropriate. Next Visit Date:  Future Appointments   Date Time Provider Nando Stallings   4/14/2020 12:00 PM SCHEDULE, [facility]   4/21/2020  1:15 PM [place]isha Ritchie [place] [id number] [facility] Maintenance   Topic Date Due    DTaP/Tdap/Td vaccine (1 - Tdap) 10/12/1965    Shingles Vaccine (1 of 2) 10/12/2004    Annual Wellness Visit (AWV)  05/29/2019    A1C test (Diabetic or Prediabetic)  06/04/2020    Lipid screen  06/04/2020    Pneumococcal 65+ years Vaccine (2 of 2 - PPSV23) 11/22/2020    Colon cancer screen colonoscopy  01/23/2028    Flu vaccine  Completed    AAA screen  Completed    Hepatitis C screen  Completed    HIV screen  Completed    Hepatitis A vaccine  Aged Out    Hepatitis B vaccine  Aged Out    Hib vaccine  Aged Out    Meningococcal (ACWY) vaccine  Aged Out       Hemoglobin A1C (%)   Date Value   06/04/2019 4.7   07/24/2018 5.9   01/30/2018 5.0             ( goal A1C is < 7)   Microalb/Crt. Ratio (mcg/mg creat)   Date Value   07/27/2018 CANNOT BE CALCULATED     LDL Cholesterol (mg/dL)   Date Value   06/04/2019 61       (goal LDL is <100)   AST (U/L)   Date Value   06/04/2019 20     ALT (U/L)   Date Value   06/04/2019 17     BUN (mg/dL)   Date Value   06/04/2019 10     BP Readings from Last 3 Encounters:   11/22/19 112/77   10/28/19 104/81   10/24/19 116/78          (goal 120/80)    All Future Testing planned in [facility]  Lab Frequency Next Occurrence   Transfuse RBC     CBC Auto Differential     Ferritin     Iron And TIBC           Patient Active Problem List:     HTN (hypertension)     Hyperlipemia     mild Cardiomyopathy-ischemic ejection fraction 40-45%      Coronary artery disease involving native coronary artery of native heart with angina pectoris (HCC)     Closed head injury with right temporal parenchymal bleed in 99. Prostate cancer adenocarcinoma.  Bess stage 6 staus post

## 2020-03-04 RX ORDER — ISOSORBIDE MONONITRATE 30 MG/1
30 TABLET, EXTENDED RELEASE ORAL DAILY
Qty: 30 TABLET | Refills: 1 | Status: SHIPPED | OUTPATIENT
Start: 2020-03-04 | End: 2020-05-01

## 2020-04-02 RX ORDER — TIOTROPIUM BROMIDE 18 UG/1
CAPSULE ORAL; RESPIRATORY (INHALATION)
Qty: 30 CAPSULE | Refills: 0 | Status: SHIPPED | OUTPATIENT
Start: 2020-04-02 | End: 2020-05-01

## 2020-04-14 ENCOUNTER — HOSPITAL ENCOUNTER (OUTPATIENT)
Facility: MEDICAL CENTER | Age: 65
Discharge: HOME | End: 2020-04-14
Payer: MEDICARE

## 2020-04-14 DIAGNOSIS — D50.0 IRON DEFICIENCY ANEMIA DUE TO CHRONIC BLOOD LOSS: ICD-10-CM

## 2020-04-14 LAB
ABSOLUTE EOS #: 0.37 K/UL (ref 0–0.44)
ABSOLUTE IMMATURE GRANULOCYTE: 0.02 K/UL (ref 0–0.3)
ABSOLUTE LYMPH #: 2.25 K/UL (ref 1.1–3.7)
ABSOLUTE MONO #: 0.59 K/UL (ref 0.1–1.2)
BASOPHILS # BLD: 1 % (ref 0–2)
BASOPHILS ABSOLUTE: 0.05 K/UL (ref 0–0.2)
DIFFERENTIAL TYPE: ABNORMAL
EOSINOPHILS RELATIVE PERCENT: 6 % (ref 1–4)
FERRITIN: 55 UG/L (ref 30–400)
HCT VFR BLD CALC: 43.7 % (ref 40.7–50.3)
HEMOGLOBIN: 13.4 G/DL (ref 13–17)
IMMATURE GRANULOCYTES: 0 %
IRON SATURATION: 36 % (ref 20–55)
IRON: 94 UG/DL (ref 59–158)
LYMPHOCYTES # BLD: 36 % (ref 24–43)
MCH RBC QN AUTO: 28.7 PG (ref 25.2–33.5)
MCHC RBC AUTO-ENTMCNC: 30.7 G/DL (ref 28.4–34.8)
MCV RBC AUTO: 93.6 FL (ref 82.6–102.9)
MONOCYTES # BLD: 9 % (ref 3–12)
NRBC AUTOMATED: 0 PER 100 WBC
PDW BLD-RTO: 13.5 % (ref 11.8–14.4)
PLATELET # BLD: 203 K/UL (ref 138–453)
PLATELET ESTIMATE: ABNORMAL
PMV BLD AUTO: 9.2 FL (ref 8.1–13.5)
RBC # BLD: 4.67 M/UL (ref 4.21–5.77)
RBC # BLD: ABNORMAL 10*6/UL
SEG NEUTROPHILS: 48 % (ref 36–65)
SEGMENTED NEUTROPHILS ABSOLUTE COUNT: 3.03 K/UL (ref 1.5–8.1)
TOTAL IRON BINDING CAPACITY: 259 UG/DL (ref 250–450)
UNSATURATED IRON BINDING CAPACITY: 165 UG/DL (ref 112–347)
WBC # BLD: 6.3 K/UL (ref 3.5–11.3)
WBC # BLD: ABNORMAL 10*3/UL

## 2020-04-14 PROCEDURE — 36415 COLL VENOUS BLD VENIPUNCTURE: CPT

## 2020-04-14 PROCEDURE — 82728 ASSAY OF FERRITIN: CPT

## 2020-04-14 PROCEDURE — 83550 IRON BINDING TEST: CPT

## 2020-04-14 PROCEDURE — 83540 ASSAY OF IRON: CPT

## 2020-04-14 PROCEDURE — 85025 COMPLETE CBC W/AUTO DIFF WBC: CPT

## 2020-04-15 ENCOUNTER — TELEPHONE (OUTPATIENT)
Dept: INTERNAL MEDICINE | Age: 65
End: 2020-04-15

## 2020-04-15 NOTE — TELEPHONE ENCOUNTER
70 y/o Pt currently has Medicare and needs Annual Wellness visit. Spoke with pt and set up his AWV. Pt doesn't have e-mail, no computer and pre-paid phone. Pt states his sister has a computer and can do it for him.  Pt aware that the visit will be over the phone. (televisit)    Plan of care:   Scheduled AWV 04/28/20  Mailed instr how to sign up for [employer], included activation code  Mailed Medicare Risk Assessment Questionnaire

## 2020-04-20 ENCOUNTER — HOSPITAL ENCOUNTER (OUTPATIENT)
Facility: MEDICAL CENTER | Age: 65
End: 2020-04-20
Payer: MEDICARE

## 2020-04-21 ENCOUNTER — TELEPHONE (OUTPATIENT)
Dept: ONCOLOGY | Age: 65
End: 2020-04-21

## 2020-04-21 ENCOUNTER — OFFICE VISIT (OUTPATIENT)
Dept: ONCOLOGY | Age: 65
End: 2020-04-21
Payer: MEDICARE

## 2020-04-21 VITALS
DIASTOLIC BLOOD PRESSURE: 82 MMHG | TEMPERATURE: 98 F | WEIGHT: 155.8 LBS | HEART RATE: 83 BPM | BODY MASS INDEX: 21.13 KG/M2 | SYSTOLIC BLOOD PRESSURE: 106 MMHG

## 2020-04-21 PROCEDURE — G8427 DOCREV CUR MEDS BY ELIG CLIN: HCPCS | Performed by: INTERNAL MEDICINE

## 2020-04-21 PROCEDURE — G8420 CALC BMI NORM PARAMETERS: HCPCS | Performed by: INTERNAL MEDICINE

## 2020-04-21 PROCEDURE — 99214 OFFICE O/P EST MOD 30 MIN: CPT | Performed by: INTERNAL MEDICINE

## 2020-04-21 PROCEDURE — 1123F ACP DISCUSS/DSCN MKR DOCD: CPT | Performed by: INTERNAL MEDICINE

## 2020-04-21 PROCEDURE — 1036F TOBACCO NON-USER: CPT | Performed by: INTERNAL MEDICINE

## 2020-04-21 PROCEDURE — 99211 OFF/OP EST MAY X REQ PHY/QHP: CPT | Performed by: INTERNAL MEDICINE

## 2020-04-21 PROCEDURE — 4040F PNEUMOC VAC/ADMIN/RCVD: CPT | Performed by: INTERNAL MEDICINE

## 2020-04-21 PROCEDURE — 3017F COLORECTAL CA SCREEN DOC REV: CPT | Performed by: INTERNAL MEDICINE

## 2020-05-01 RX ORDER — ISOSORBIDE MONONITRATE 30 MG/1
30 TABLET, EXTENDED RELEASE ORAL DAILY
Qty: 30 TABLET | Refills: 1 | Status: SHIPPED | OUTPATIENT
Start: 2020-05-01 | End: 2020-06-02

## 2020-05-01 RX ORDER — TIOTROPIUM BROMIDE 18 UG/1
CAPSULE ORAL; RESPIRATORY (INHALATION)
Qty: 30 CAPSULE | Refills: 0 | Status: SHIPPED | OUTPATIENT
Start: 2020-05-01 | End: 2020-05-12 | Stop reason: SDUPTHER

## 2020-05-01 NOTE — TELEPHONE ENCOUNTER
Request for Imdur and Spiriva. Next Visit Date:  Future Appointments   Date Time Provider Nando Haylie   5/20/2020  9:30 AM Asha Diallo MD [facility] IM [facility]   10/22/2020  1:15 PM SCHEDULE, P [facility]   10/29/2020 11:00 AM Aditya Thomposn MD SV [facility]       Health Maintenance   Topic Date Due    DTaP/Tdap/Td vaccine (1 - Tdap) 10/12/1973    Shingles Vaccine (1 of 2) 10/12/2004    Annual Wellness Visit (AWV)  05/29/2019    PSA counseling  10/25/2019    A1C test (Diabetic or Prediabetic)  06/04/2020    Lipid screen  06/04/2020    Pneumococcal 65+ years Vaccine (2 of 2 - PPSV23) 11/22/2020    Colon cancer screen colonoscopy  01/23/2028    Flu vaccine  Completed    AAA screen  Completed    Hepatitis C screen  Completed    HIV screen  Completed    Hepatitis A vaccine  Aged Out    Hepatitis B vaccine  Aged Out    Hib vaccine  Aged Out    Meningococcal (ACWY) vaccine  Aged Out       Hemoglobin A1C (%)   Date Value   06/04/2019 4.7   07/24/2018 5.9   01/30/2018 5.0             ( goal A1C is < 7)   Microalb/Crt. Ratio (mcg/mg creat)   Date Value   07/27/2018 CANNOT BE CALCULATED     LDL Cholesterol (mg/dL)   Date Value   06/04/2019 61       (goal LDL is <100)   AST (U/L)   Date Value   06/04/2019 20     ALT (U/L)   Date Value   06/04/2019 17     BUN (mg/dL)   Date Value   06/04/2019 10     BP Readings from Last 3 Encounters:   04/21/20 106/82   11/22/19 112/77   10/28/19 104/81          (goal 120/80)    All Future Testing planned in [facility]  Lab Frequency Next Occurrence   Transfuse RBC Transfusion          Patient Active Problem List:     HTN (hypertension)     mild Cardiomyopathy-ischemic ejection fraction 40-45%      Coronary artery disease involving native coronary artery of native heart with angina pectoris (HCC)     Closed head injury with right temporal parenchymal bleed in 99. Prostate cancer adenocarcinoma.  Bess stage 6 staus post laparoscopic prostatectomy 2009.      History of right  inguinal hernia repair     Stage 3 severe COPD by GOLD classification ([address]     Erectile dysfunction     Personal history of malignant neoplasm of prostate     Hyperlipidemia     CKD (chronic kidney disease)     Prediabetes     History of colon polyps     Iron deficiency anemia due to chronic blood loss     CAD in native artery     Iron deficiency anemia secondary to inadequate dietary iron intake     Malabsorption

## 2020-05-03 RX ORDER — ISOSORBIDE MONONITRATE 30 MG/1
TABLET, EXTENDED RELEASE ORAL
Qty: 90 TABLET | OUTPATIENT
Start: 2020-05-03

## 2020-05-04 RX ORDER — ATORVASTATIN CALCIUM 20 MG/1
20 TABLET, FILM COATED ORAL DAILY
Qty: 90 TABLET | Refills: 1 | Status: SHIPPED | OUTPATIENT
Start: 2020-05-04 | End: 2020-07-27

## 2020-05-06 RX ORDER — AMLODIPINE BESYLATE 2.5 MG/1
2.5 TABLET ORAL DAILY
Qty: 90 TABLET | Refills: 0 | Status: SHIPPED | OUTPATIENT
Start: 2020-05-06 | End: 2020-06-26 | Stop reason: SDUPTHER

## 2020-05-06 RX ORDER — AMLODIPINE BESYLATE 2.5 MG/1
2.5 TABLET ORAL DAILY
Qty: 90 TABLET | Refills: 1 | Status: CANCELLED | OUTPATIENT
Start: 2020-05-06

## 2020-05-06 NOTE — TELEPHONE ENCOUNTER
Bess stage 6 staus post laparoscopic prostatectomy 2009.      History of right  inguinal hernia repair     Stage 3 severe COPD by GOLD classification ([address]     Erectile dysfunction     Personal history of malignant neoplasm of prostate     Hyperlipidemia     CKD (chronic kidney disease)     Prediabetes     History of colon polyps     Iron deficiency anemia due to chronic blood loss     CAD in native artery     Iron deficiency anemia secondary to inadequate dietary iron intake     Malabsorption

## 2020-05-12 ENCOUNTER — VIRTUAL VISIT (OUTPATIENT)
Dept: INTERNAL MEDICINE | Age: 65
End: 2020-05-12
Payer: MEDICARE

## 2020-05-12 PROCEDURE — 99442 PR PHYS/QHP TELEPHONE EVALUATION 11-20 MIN: CPT | Performed by: INTERNAL MEDICINE

## 2020-05-12 RX ORDER — GUAIFENESIN 600 MG/1
600 TABLET, EXTENDED RELEASE ORAL 2 TIMES DAILY
Qty: 30 TABLET | Refills: 0 | Status: SHIPPED | OUTPATIENT
Start: 2020-05-12 | End: 2020-05-27

## 2020-05-12 RX ORDER — BUDESONIDE AND FORMOTEROL FUMARATE DIHYDRATE 80; 4.5 UG/1; UG/1
2 AEROSOL RESPIRATORY (INHALATION) 2 TIMES DAILY
Qty: 2 INHALER | Refills: 5 | Status: SHIPPED | OUTPATIENT
Start: 2020-05-12 | End: 2020-10-30 | Stop reason: SDUPTHER

## 2020-05-12 RX ORDER — ALBUTEROL SULFATE 90 UG/1
2 AEROSOL, METERED RESPIRATORY (INHALATION) EVERY 6 HOURS PRN
Qty: 1 INHALER | Refills: 3 | Status: SHIPPED | OUTPATIENT
Start: 2020-05-12 | End: 2022-02-15 | Stop reason: SDUPTHER

## 2020-05-12 RX ORDER — TIOTROPIUM BROMIDE 18 UG/1
CAPSULE ORAL; RESPIRATORY (INHALATION)
Qty: 30 CAPSULE | Refills: 5 | Status: SHIPPED | OUTPATIENT
Start: 2020-05-12 | End: 2020-12-02 | Stop reason: SDUPTHER

## 2020-05-12 RX ORDER — CLOPIDOGREL BISULFATE 75 MG/1
75 TABLET ORAL DAILY
Qty: 90 TABLET | Refills: 1 | Status: SHIPPED | OUTPATIENT
Start: 2020-05-12 | End: 2020-12-31 | Stop reason: SDUPTHER

## 2020-05-12 NOTE — PATIENT INSTRUCTIONS
-Pt due for 3 month f/u in August-- pt to call in July to set up an appt--reminder in [facility] to contact patient as well--AVS given to patient    -Bloodwork orders given to patient, they will have them done before their next visit.      Tavo,[facility]

## 2020-06-01 NOTE — TELEPHONE ENCOUNTER
Request for imdur - medication pended. Please fill if appropriate. Next Visit Date:  Future Appointments   Date Time Provider Nando Haylie   10/22/2020  1:15 PM SCHEDULE, [facility]  CANCER SV [facility]   10/29/2020 11:00 AM [facility]       Health Maintenance   Topic Date Due    DTaP/Tdap/Td vaccine (1 - Tdap) 10/12/1973    Shingles Vaccine (1 of 2) 10/12/2004    Annual Wellness Visit (AWV)  05/29/2019    PSA counseling  10/25/2019    A1C test (Diabetic or Prediabetic)  06/04/2020    Lipid screen  06/04/2020    Pneumococcal 65+ years Vaccine (2 of 2 - PPSV23) 11/22/2020    Colon cancer screen colonoscopy  01/23/2028    Flu vaccine  Completed    AAA screen  Completed    Hepatitis C screen  Completed    HIV screen  Completed    Hepatitis A vaccine  Aged Out    Hepatitis B vaccine  Aged Out    Hib vaccine  Aged Out    Meningococcal (ACWY) vaccine  Aged Out       Hemoglobin A1C (%)   Date Value   06/04/2019 4.7   07/24/2018 5.9   01/30/2018 5.0             ( goal A1C is < 7)   Microalb/Crt. Ratio (mcg/mg creat)   Date Value   07/27/2018 CANNOT BE CALCULATED     LDL Cholesterol (mg/dL)   Date Value   06/04/2019 61       (goal LDL is <100)   AST (U/L)   Date Value   06/04/2019 20     ALT (U/L)   Date Value   06/04/2019 17     BUN (mg/dL)   Date Value   06/04/2019 10     BP Readings from Last 3 Encounters:   04/21/20 106/82   11/22/19 112/77   10/28/19 104/81          (goal 120/80)    All Future Testing planned in [facility]  Lab Frequency Next Occurrence   Hemoglobin A1C Once 08/13/2020   Lipid Panel Once 08/13/2020   Comprehensive Metabolic Panel Once 27/74/5284   Transfuse RBC Transfusion          Patient Active Problem List:     HTN (hypertension)     mild Cardiomyopathy-ischemic ejection fraction 40-45%      Coronary artery disease involving native coronary artery of native heart with angina pectoris ([facility] [address]     Prostate cancer adenocarcinoma.  Bess stage 6

## 2020-06-02 RX ORDER — ISOSORBIDE MONONITRATE 30 MG/1
TABLET, EXTENDED RELEASE ORAL
Qty: 90 TABLET | Refills: 1 | Status: SHIPPED | OUTPATIENT
Start: 2020-06-02 | End: 2020-12-10 | Stop reason: SDUPTHER

## 2020-06-03 ENCOUNTER — HOSPITAL ENCOUNTER (OUTPATIENT)
Age: 65
Setting detail: SPECIMEN
Discharge: HOME | End: 2020-06-03
Payer: MEDICARE

## 2020-06-03 LAB
ALBUMIN SERPL-MCNC: 4.2 G/DL (ref 3.5–5.2)
ALBUMIN/GLOBULIN RATIO: 1.4 (ref 1–2.5)
ALP BLD-CCNC: 83 U/L (ref 40–129)
ALT SERPL-CCNC: 11 U/L (ref 5–41)
ANION GAP SERPL CALCULATED.3IONS-SCNC: 15 MMOL/L (ref 9–17)
AST SERPL-CCNC: 15 U/L
BILIRUB SERPL-MCNC: 0.4 MG/DL (ref 0.3–1.2)
BUN BLDV-MCNC: 12 MG/DL (ref 8–23)
BUN/CREAT BLD: ABNORMAL (ref 9–20)
CALCIUM SERPL-MCNC: 9 MG/DL (ref 8.6–10.4)
CHLORIDE BLD-SCNC: 97 MMOL/L (ref 98–107)
CHOLESTEROL/HDL RATIO: 2.9
CHOLESTEROL: 104 MG/DL
CO2: 26 MMOL/L (ref 20–31)
CREAT SERPL-MCNC: 1.46 MG/DL (ref 0.7–1.2)
ESTIMATED AVERAGE GLUCOSE: 134 MG/DL
GFR AFRICAN AMERICAN: 59 ML/MIN
GFR NON-AFRICAN AMERICAN: 48 ML/MIN
GFR SERPL CREATININE-BSD FRML MDRD: ABNORMAL ML/MIN/{1.73_M2}
GFR SERPL CREATININE-BSD FRML MDRD: ABNORMAL ML/MIN/{1.73_M2}
GLUCOSE BLD-MCNC: 91 MG/DL (ref 70–99)
HBA1C MFR BLD: 6.3 % (ref 4–6)
HDLC SERPL-MCNC: 36 MG/DL
LDL CHOLESTEROL: 51 MG/DL (ref 0–130)
POTASSIUM SERPL-SCNC: 4 MMOL/L (ref 3.7–5.3)
SODIUM BLD-SCNC: 138 MMOL/L (ref 135–144)
TOTAL PROTEIN: 7.1 G/DL (ref 6.4–8.3)
TRIGL SERPL-MCNC: 85 MG/DL
VLDLC SERPL CALC-MCNC: ABNORMAL MG/DL (ref 1–30)

## 2020-06-26 ENCOUNTER — TELEPHONE (OUTPATIENT)
Dept: INTERNAL MEDICINE | Age: 65
End: 2020-06-26

## 2020-06-26 RX ORDER — AMLODIPINE BESYLATE 5 MG/1
5 TABLET ORAL DAILY
Qty: 30 TABLET | Refills: 2 | Status: SHIPPED | OUTPATIENT
Start: 2020-06-26 | End: 2020-10-29 | Stop reason: DRUGHIGH

## 2020-06-26 NOTE — TELEPHONE ENCOUNTER
Start date 6/18/2020:  121/90  120/85  130/98  115/83  129/94  131/91  121/95  121/91  Today: 124/93

## 2020-07-06 ENCOUNTER — HOSPITAL ENCOUNTER (OUTPATIENT)
Age: 65
Setting detail: SPECIMEN
Discharge: HOME | End: 2020-07-06
Payer: MEDICARE

## 2020-07-06 LAB
ANION GAP SERPL CALCULATED.3IONS-SCNC: 12 MMOL/L (ref 9–17)
BUN BLDV-MCNC: 11 MG/DL (ref 8–23)
BUN/CREAT BLD: ABNORMAL (ref 9–20)
CALCIUM SERPL-MCNC: 8.4 MG/DL (ref 8.6–10.4)
CHLORIDE BLD-SCNC: 102 MMOL/L (ref 98–107)
CO2: 24 MMOL/L (ref 20–31)
CREAT SERPL-MCNC: 1.33 MG/DL (ref 0.7–1.2)
GFR AFRICAN AMERICAN: >60 ML/MIN
GFR NON-AFRICAN AMERICAN: 54 ML/MIN
GFR SERPL CREATININE-BSD FRML MDRD: ABNORMAL ML/MIN/{1.73_M2}
GFR SERPL CREATININE-BSD FRML MDRD: ABNORMAL ML/MIN/{1.73_M2}
GLUCOSE BLD-MCNC: 96 MG/DL (ref 70–99)
POTASSIUM SERPL-SCNC: 4 MMOL/L (ref 3.7–5.3)
SODIUM BLD-SCNC: 138 MMOL/L (ref 135–144)

## 2020-07-08 RX ORDER — PANTOPRAZOLE SODIUM 40 MG/1
40 TABLET, DELAYED RELEASE ORAL DAILY
Qty: 90 TABLET | Refills: 3 | Status: SHIPPED | OUTPATIENT
Start: 2020-07-08 | End: 2021-03-29

## 2020-07-08 NOTE — TELEPHONE ENCOUNTER
RECEIVED FAXED REQUEST FROM [facility] FOR REFILL OF THE PANTOPRAZOLE. LAST WRITTEN ONE YEAR AGO WITH REFILLS  UPCOMING MD EXAM 10/29/2020  PENDED FOR REVIEW.

## 2020-07-09 ENCOUNTER — OFFICE VISIT (OUTPATIENT)
Dept: INTERNAL MEDICINE | Age: 65
End: 2020-07-09
Payer: MEDICARE

## 2020-07-09 VITALS
SYSTOLIC BLOOD PRESSURE: 126 MMHG | DIASTOLIC BLOOD PRESSURE: 87 MMHG | HEART RATE: 84 BPM | BODY MASS INDEX: 20.59 KG/M2 | WEIGHT: 152 LBS | HEIGHT: 72 IN

## 2020-07-09 PROBLEM — D50.0 IRON DEFICIENCY ANEMIA DUE TO CHRONIC BLOOD LOSS: Status: RESOLVED | Noted: 2018-02-13 | Resolved: 2020-07-09

## 2020-07-09 PROCEDURE — G8427 DOCREV CUR MEDS BY ELIG CLIN: HCPCS | Performed by: INTERNAL MEDICINE

## 2020-07-09 PROCEDURE — 4040F PNEUMOC VAC/ADMIN/RCVD: CPT | Performed by: INTERNAL MEDICINE

## 2020-07-09 PROCEDURE — 1036F TOBACCO NON-USER: CPT | Performed by: INTERNAL MEDICINE

## 2020-07-09 PROCEDURE — 99211 OFF/OP EST MAY X REQ PHY/QHP: CPT | Performed by: INTERNAL MEDICINE

## 2020-07-09 PROCEDURE — 1123F ACP DISCUSS/DSCN MKR DOCD: CPT | Performed by: INTERNAL MEDICINE

## 2020-07-09 PROCEDURE — G8420 CALC BMI NORM PARAMETERS: HCPCS | Performed by: INTERNAL MEDICINE

## 2020-07-09 PROCEDURE — 3017F COLORECTAL CA SCREEN DOC REV: CPT | Performed by: INTERNAL MEDICINE

## 2020-07-09 PROCEDURE — 99214 OFFICE O/P EST MOD 30 MIN: CPT | Performed by: INTERNAL MEDICINE

## 2020-07-09 RX ORDER — DOCUSATE SODIUM 100 MG/1
100 CAPSULE, LIQUID FILLED ORAL 2 TIMES DAILY
COMMUNITY
End: 2020-10-28

## 2020-07-09 ASSESSMENT — ENCOUNTER SYMPTOMS
COUGH: 0
ABDOMINAL PAIN: 0
SHORTNESS OF BREATH: 1
BLOOD IN STOOL: 0
WHEEZING: 0
CONSTIPATION: 0
EYE REDNESS: 0

## 2020-07-09 ASSESSMENT — PATIENT HEALTH QUESTIONNAIRE - PHQ9
SUM OF ALL RESPONSES TO PHQ QUESTIONS 1-9: 1
SUM OF ALL RESPONSES TO PHQ9 QUESTIONS 1 & 2: 1
SUM OF ALL RESPONSES TO PHQ QUESTIONS 1-9: 1
1. LITTLE INTEREST OR PLEASURE IN DOING THINGS: 0
2. FEELING DOWN, DEPRESSED OR HOPELESS: 1

## 2020-07-09 NOTE — PATIENT INSTRUCTIONS
-Pt due for 6 month f/u in January-- pt to call in December to set up an appt--reminder in [facility] to contact patient as well--AVS given to patient    -Bloodwork orders given to patient, they will have them done before their next visit.     -Your doctor has ordered a Renal US for you, please contact our scheduling department at [phone] to set up an appointment to have this completed before your next visit.       -Willy Lemus

## 2020-07-09 NOTE — PROGRESS NOTES
[facility]    Progress Note    Date of patient's visit: 7/9/2020    Patient's Name:  Mone Watson    YOB: 1955            Patient Care Team:  Sophie Lu MD as PCP - General (Internal Medicine)  Sophie Lu MD as PCP - [facility]  LINO Salter - CNP as Nurse Practitioner (Cardiology)  Bhupinder Vazquez RN as   Jorge Ramirez MD as Consulting Physician (Gastroenterology)    REASON FOR VISIT: Routine outpatient follow     Chief Complaint   Patient presents with    Hypertension    Other     [address] GAP (prostate cancer, Angina Pectoris,CHF    Congestive Heart Failure    Health Maintenance     vaccines refused--- AWV scheduled for 7/21/2020          HISTORY OF PRESENT ILLNESS:    History was obtained from the patient. Mone Watson is a 71 y.o. is here for follow-up on his chronic medical problems. He is overall doing well. He says he recently saw cardiologist.  His last bare-metal stent was placed in 2018. He is taking all his medications. Blood pressure is controlled. He had recent labs which were reviewed. His anemia has resolved. He does not have an AICD. He had prostate cancer 10 years ago. He has not been following up with urologist for the last few years. He has chronic kidney disease stage III. JAVAN, SPEP UPEP have been negative in the past.  Hepatitis C is negative. He has prediabetes. Hypertension is well controlled. No hematuria. No history of nephrolithiasis. He denies any prostate symptoms currently. Echo 2018  CONCLUSIONS     Summary  Left ventricle is normal in size, global left ventricular systolic function  is moderately reduced with global hypokinesis (more in anterolateral wall). Estimated ejection fraction is 35%. Grade I (mild) left ventricular diastolic dysfunction. No significant valvular regurgitation or stenosis seen.   Insignificant tricuspid regurgitation, unable to estimate RVSP      angiogram 2018  Conclusions      Procedure Summary      99% mid RCA stenosis reduced to 0% using 4x28 vision stent and post   dilated using 4 mm NC balloon. Nonobstructive disease of other arteries. BMS used due to large stent (4 mm) and history of gastritis. Recommendations      Dual antiplatelet therapy and risk factor modifications      pathology 2018  SURGICAL PATHOLOGY CONSULTATION     Patient Name: Yomaira Taylor Med Rec: [id number]   Path Number: [id number]   Collected: 1/23/2018   Received: 1/23/2018   Reported: 1/24/2018 13:18     -- Diagnosis --   1.  GASTRIC BODY BIOPSY:  INTESTINAL METAPLASIA WITH ADENOMA. 2.  RECTOSIGMOID BIOPSY:  ADENOMA.    Past Medical History:   Diagnosis Date    Alcohol withdrawal seizure ([facility] [address] 1991    quit ETOH since 1991    Blood loss anemia 2008    transfusion from chronic plavix and asa use    CAD (coronary artery disease) 2006    angioplast with drug eluting stent to l circumflex     Cardiomyopathy ([facility]     Chest pain     Chronic kidney disease     CKD (chronic kidney disease) 8/9/2016    COPD (chronic obstructive pulmonary disease) ([facility] [address] [facility] 08/2012    severe obstructive disease with bronchospasm on PFT    Cough     \"smoker\"    Erectile dysfunction     History of transfusion of packed red blood cells 2009    s/p prostate surgery    Hyperlipidemia     Hypertension     Nicotine dependence     Primary adenocarcinoma of prostate ([facility] [address] 2009    tucker stage 6 s/p lap prostatectomy    Urinary incontinence     Wears dentures     upper and lower    Wears glasses        Past Surgical History:   Procedure Laterality Date    CARDIAC CATHETERIZATION  2006    drug eluting stent to LCX STENT X1    CARDIAC CATHETERIZATION  10/2015    STENT WAS PATENT    CARDIAC CATHETERIZATION  10/03/2018    STENT X1    COLONOSCOPY  2008    int hemorrhoids, tiny sigmoid polyp    COLONOSCOPY  09/19/2017    Normal    COLONOSCOPY  1/23/2018    COLONOSCOPY WITH BIOPSY performed by Carissa Pena MD at [facility]    ENDOSCOPY, COLON, DIAGNOSTIC      INGUINAL HERNIA REPAIR Right     [place] COLON CA SCRN NOT  [address] IND N/A 9/19/2017    COLONOSCOPY performed by Ariel Irving MD at [address] ESOPHAGOGASTRODUODENOSCOPY TRANSORAL DIAGNOSTIC N/A 9/19/2017    EGD ESOPHAGOGASTRODUODENOSCOPY performed by Ariel Irving MD at [address] [place] [address]  2009    adenocarcinoma tucker stage 6    UPPER GASTROINTESTINAL ENDOSCOPY  2008    normal    UPPER GASTROINTESTINAL ENDOSCOPY  09/19/2017    The cardia, fundus, incisura, antrum and pylorus were identified via direct visualization. The endoscopic exam showed 6 small to medium AVMs. Hemostasis was achieved by BiCap cautery. Two hemoclips were placed.       UPPER GASTROINTESTINAL ENDOSCOPY  1/23/2018    EGD BIOPSY performed by Craissa Pena MD at [facility]         ALLERGIES      Allergies   Allergen Reactions    Phenytoin Sodium Extended Rash       MEDICATIONS:      Current Outpatient Medications on File Prior to Visit   Medication Sig Dispense Refill    docusate sodium (COLACE) 100 MG capsule Take 100 mg by mouth 2 times daily      pantoprazole (PROTONIX) 40 MG tablet Take 1 tablet by mouth daily 90 tablet 3    amLODIPine (NORVASC) 5 MG tablet Take 1 tablet by mouth daily 30 tablet 2    isosorbide mononitrate (IMDUR) 30 MG extended release tablet TAKE 1 TABLET BY MOUTH EVERY DAY 90 tablet 1    tiotropium (SPIRIVA HANDIHALER) 18 MCG inhalation capsule Once daily 30 capsule 5    budesonide-formoterol (SYMBICORT) 80-4.5 MCG/ACT AERO Inhale 2 puffs into the lungs 2 times daily 2 Inhaler 5    albuterol sulfate HFA (PROAIR HFA) 108 (90 Base) MCG/ACT inhaler Inhale 2 puffs into the lungs every 6 hours as needed for Wheezing 1 Inhaler 3    metoprolol tartrate (LOPRESSOR) 25 MG tablet Take 1 tablet by mouth 2 times daily 180 tablet 1    clopidogrel (PLAVIX) 75 MG tablet Take 1 tablet by mouth daily 90 tablet 1  atorvastatin (LIPITOR) 20 MG tablet TAKE 1 TABLET BY MOUTH DAILY 90 tablet 1    nitroGLYCERIN (NITROSTAT) 0.3 MG SL tablet DISSOLVE 1 TABLET UNDER THE TONGUE EVERY 5 MINUTES AS NEEDED FOR CHEST PAIN UNTIL RELIEF IS OBTAINED, IF PAIN PERSISTS, CALL 911 100 tablet 0    ranolazine (RANEXA) 500 MG extended release tablet Take 500 mg by mouth 2 times daily      aspirin 81 MG chewable tablet Take 1 tablet by mouth daily 30 tablet 3     No current facility-administered medications on file prior to visit. SOCIAL HISTORY    Reviewed and no change from previous record. Silke Singletary  reports that he quit smoking about 21 months ago. His smoking use included cigarettes. He has a 25.00 pack-year smoking history. He has never used smokeless tobacco.    FAMILY HISTORY:    Reviewed and No change from previous visit    HEALTH MAINTENANCE DUE:      Health Maintenance Due   Topic Date Due    DTaP/Tdap/Td vaccine (1 - Tdap) 10/12/1973    Shingles Vaccine (1 of 2) 10/12/2004    Annual Wellness Visit (AWV)  05/29/2019    PSA counseling  10/25/2019       REVIEW OF SYSTEMS:    12 point review of symptoms completed and found to be normal except noted in the HPI    Review of Systems   Constitutional: Negative for chills, fatigue and unexpected weight change. Eyes: Negative for redness and visual disturbance. Respiratory: Positive for shortness of breath. Negative for cough and wheezing. Cardiovascular: Negative for chest pain, palpitations and leg swelling. Gastrointestinal: Negative for abdominal pain, blood in stool and constipation. Endocrine: Negative for polydipsia and polyuria. Genitourinary: Negative for dysuria, flank pain and hematuria. Neurological: Negative for dizziness, syncope, weakness, numbness and headaches. Hematological: Negative for adenopathy. Does not bruise/bleed easily. Psychiatric/Behavioral: Negative for dysphoric mood. The patient is not nervous/anxious.          PHYSICAL EXAM: Vitals:    07/09/20 1331   BP: 126/87   Site: Right Upper Arm   Position: Sitting   Cuff Size: Medium Adult   Pulse: 84   Weight: 152 lb (68.9 kg)   Height: 6' (1.829 m)     Body mass index is 20.61 kg/m². BP Readings from Last 3 Encounters:   07/09/20 126/87   04/21/20 106/82   11/22/19 112/77        Wt Readings from Last 3 Encounters:   07/09/20 152 lb (68.9 kg)   04/21/20 155 lb 12.8 oz (70.7 kg)   11/22/19 156 lb (70.8 kg)       Physical Exam  Vitals signs and nursing note reviewed. Constitutional:       Appearance: Normal appearance. HENT:      Head: Normocephalic and atraumatic. Eyes:      General: No scleral icterus. Extraocular Movements: Extraocular movements intact. Conjunctiva/sclera: Conjunctivae normal.      Pupils: Pupils are equal, round, and reactive to light. Neck:      Musculoskeletal: Normal range of motion and neck supple. Cardiovascular:      Rate and Rhythm: Normal rate and regular rhythm. Pulmonary:      Effort: Pulmonary effort is normal.      Breath sounds: Normal breath sounds. Musculoskeletal:      Right lower leg: No edema. Left lower leg: No edema. Skin:     General: Skin is warm and dry. Neurological:      General: No focal deficit present. Mental Status: He is alert and oriented to person, place, and time.              LABORATORY FINDINGS:    CBC:  Lab Results   Component Value Date    WBC 6.3 04/14/2020    HGB 13.4 04/14/2020     04/14/2020     BMP:    Lab Results   Component Value Date     07/06/2020    K 4.0 07/06/2020     07/06/2020    CO2 24 07/06/2020    BUN 11 07/06/2020    CREATININE 1.33 07/06/2020    GLUCOSE 96 07/06/2020     HEMOGLOBIN A1C:   Lab Results   Component Value Date    LABA1C 6.3 06/03/2020     MICROALBUMIN URINE:   Lab Results   Component Value Date    MICROALBUR <12 07/27/2018     FASTING LIPID [email or web address]@[employer]  Lab Results   Component Value Date    LDLCHOLESTEROL 46 06/03/2020       LIVER PROFILE:  Lab Results   Component Value Date    ALT 11 06/03/2020    AST 15 06/03/2020    PROT 7.1 06/03/2020    BILITOT 0.40 06/03/2020    BILIDIR <0.08 07/26/2016    LABALBU 4.2 06/03/2020      THYROID FUNCTION:   Lab Results   Component Value Date    TSH 1.53 10/25/2018      URINEANALYSIS: No results found for: LABURIN  ASSESSMENT AND PLAN:    1. Essential hypertension  Same meds    2. CAD S/P percutaneous coronary angioplasty  Continue current meds      3. Prediabetes  Monitor      4. Stage 3 chronic kidney disease (HCC)    - US RENAL COMPLETE; Future  - Urinalysis with Microscopic; Future    5. Ischemic cardiomyopathy  Get cardiology notes  Asa  BB      6. Prostate cancer adenocarcinoma. Bess stage 6 staus post laparoscopic prostatectomy 2009. - PSA, Diagnostic; Future  - Urinalysis with Microscopic; Future          FOLLOW UP AND INSTRUCTIONS:   Return in about 6 months (around 1/9/2021). 1. Ct Manners received counseling on the following healthy behaviors: nutrition, exercise and medication adherence    2. Reviewed prior labs and health maintenance. 3. Discussed use, benefit, and side effects of prescribed medications. Barriers to medication compliance addressed. All patient questions answered. Pt voiced understanding.        Keena Biggs  Attending Physician, [address] [place] [address], Internal Medicine Residency Program  [address] [place] [address]  7/9/2020, 1:49 PM

## 2020-07-21 ENCOUNTER — VIRTUAL VISIT (OUTPATIENT)
Dept: INTERNAL MEDICINE | Age: 65
End: 2020-07-21
Payer: MEDICARE

## 2020-07-21 PROCEDURE — 1123F ACP DISCUSS/DSCN MKR DOCD: CPT | Performed by: INTERNAL MEDICINE

## 2020-07-21 PROCEDURE — 3017F COLORECTAL CA SCREEN DOC REV: CPT | Performed by: INTERNAL MEDICINE

## 2020-07-21 PROCEDURE — G0438 PPPS, INITIAL VISIT: HCPCS | Performed by: INTERNAL MEDICINE

## 2020-07-21 PROCEDURE — 4040F PNEUMOC VAC/ADMIN/RCVD: CPT | Performed by: INTERNAL MEDICINE

## 2020-07-21 ASSESSMENT — PATIENT HEALTH QUESTIONNAIRE - PHQ9
SUM OF ALL RESPONSES TO PHQ QUESTIONS 1-9: 0
SUM OF ALL RESPONSES TO PHQ QUESTIONS 1-9: 0

## 2020-07-21 ASSESSMENT — LIFESTYLE VARIABLES: HOW OFTEN DO YOU HAVE A DRINK CONTAINING ALCOHOL: 0

## 2020-07-21 NOTE — PROGRESS NOTES
Medicare Annual Wellness Visit  Are Name: Joan Butler Date: 2020   MRN: [id number] Sex: Male   Age: 71 y.o. Ethnicity: Non-/Non    : 1955 Race: Black      Kishore Like is here for Medicare AWV    Screenings for behavioral, psychosocial and functional/safety risks, and cognitive dysfunction are all negative except as indicated below. These results, as well as other patient data from the [address] form, are documented in Flowsheets linked to this Encounter. Allergies   Allergen Reactions    Phenytoin Sodium Extended Rash       Prior to Visit Medications    Medication Sig Taking?  Authorizing Provider   docusate sodium (COLACE) 100 MG capsule Take 100 mg by mouth 2 times daily Yes Historical Provider, MD   pantoprazole (PROTONIX) 40 MG tablet Take 1 tablet by mouth daily Yes Chyrgeorgette Farias MD   amLODIPine (NORVASC) 5 MG tablet Take 1 tablet by mouth daily Yes Gerri Sims MD   isosorbide mononitrate (IMDUR) 30 MG extended release tablet TAKE 1 TABLET BY MOUTH EVERY DAY Yes Gerri Sims MD   tiotropium (Barbaraann Distad) 18 MCG inhalation capsule Once daily Yes Gerri Sims MD   budesonide-formoterol (SYMBICORT) 80-4.5 MCG/ACT AERO Inhale 2 puffs into the lungs 2 times daily Yes Gerri Sims MD   albuterol sulfate HFA (PROAIR HFA) 108 (90 Base) MCG/ACT inhaler Inhale 2 puffs into the lungs every 6 hours as needed for Wheezing Yes Gerri Sims MD   metoprolol tartrate (LOPRESSOR) 25 MG tablet Take 1 tablet by mouth 2 times daily Yes Gerri Sims MD   clopidogrel (PLAVIX) 75 MG tablet Take 1 tablet by mouth daily Yes Gerri Sims MD   atorvastatin (LIPITOR) 20 MG tablet TAKE 1 TABLET BY MOUTH DAILY Yes Gerri Sims MD   nitroGLYCERIN (NITROSTAT) 0.3 MG SL tablet DISSOLVE 1 TABLET UNDER THE TONGUE EVERY 5 MINUTES AS NEEDED FOR CHEST PAIN UNTIL RELIEF IS OBTAINED, IF PAIN PERSISTS, CALL 911 Yes Gerri Sims MD   ranolazine (RANEXA) 500 MG extended release tablet Take 500 mg by mouth 2 times daily Yes Historical Provider, MD   aspirin 81 MG chewable tablet Take 1 tablet by mouth daily Yes Nereyda Cohen, APRN - CNP       Past Medical History:   Diagnosis Date    Alcohol withdrawal seizure ([facility] [address] 1991    quit ETOH since 1991    Blood loss anemia 2008    transfusion from chronic plavix and asa use    CAD (coronary artery disease) 2006    angioplast with drug eluting stent to l circumflex     Cardiomyopathy ([facility]     Chest pain     Chronic kidney disease     CKD (chronic kidney disease) 8/9/2016    COPD (chronic obstructive pulmonary disease) ([facility] 08/2012    severe obstructive disease with bronchospasm on PFT    Cough     \"smoker\"    Erectile dysfunction     History of transfusion of packed red blood cells 2009    s/p prostate surgery    Hyperlipidemia     Hypertension     Nicotine dependence     Primary adenocarcinoma of prostate ([facility] 2009    tucker stage 6 s/p lap prostatectomy    Urinary incontinence     Wears dentures     upper and lower    Wears glasses        Past Surgical History:   Procedure Laterality Date    CARDIAC CATHETERIZATION  2006    drug eluting stent to LCX STENT X1    CARDIAC CATHETERIZATION  10/2015    STENT WAS PATENT    CARDIAC CATHETERIZATION  10/03/2018    STENT X1    COLONOSCOPY  2008    int hemorrhoids, tiny sigmoid polyp    COLONOSCOPY  09/19/2017    Normal    COLONOSCOPY  1/23/2018    COLONOSCOPY WITH BIOPSY performed by Shirlene Conklin MD at [facility]    ENDOSCOPY, COLON, DIAGNOSTIC      INGUINAL HERNIA REPAIR Right     OR COLON CA SCRN NOT  [address] [place] [address] IND N/A 9/19/2017    COLONOSCOPY performed by Bhavya Gonzalez MD at [address] ESOPHAGOGASTRODUODENOSCOPY TRANSORAL DIAGNOSTIC N/A 9/19/2017    EGD ESOPHAGOGASTRODUODENOSCOPY performed by Bhavya Gonzalez MD at [address] [place] [address]  2009    adenocarcinoma tucker stage 6    UPPER GASTROINTESTINAL ENDOSCOPY  2008 normal    UPPER GASTROINTESTINAL ENDOSCOPY  09/19/2017    The cardia, fundus, incisura, antrum and pylorus were identified via direct visualization. The endoscopic exam showed 6 small to medium AVMs. Hemostasis was achieved by BiCap cautery. Two hemoclips were placed.  UPPER GASTROINTESTINAL ENDOSCOPY  1/23/2018    EGD BIOPSY performed by Ivan Robbins MD at [facility]       Family History   Problem Relation Age of Onset    High Blood Pressure Mother     Heart Disease Mother         CHF    Substance Abuse Father         etoh cirrhosis       CareTeam (Including outside providers/suppliers regularly involved in providing care):   Patient Care Team:  Cachorro Stockton MD as PCP - General (Internal Medicine)  Cachorro Stockton MD as PCP - [facility] Empaneled Provider  LINO Bautista - CNP as Nurse Practitioner (Cardiology)  Lucrecia Guerrero RN as   Diamond Soliman MD as Consulting Physician (Gastroenterology)    Wt Readings from Last 3 Encounters:   07/09/20 152 lb (68.9 kg)   04/21/20 155 lb 12.8 oz (70.7 kg)   11/22/19 156 lb (70.8 kg)      No flowsheet data found. There is no height or weight on file to calculate BMI. Based upon direct observation of the patient, evaluation of cognition reveals recent and remote memory intact. Patient's complete Health Risk Assessment and screening values have been reviewed and are found in Flowsheets. The following problems were reviewed today and where indicated follow up appointments were made and/or referrals ordered. Positive Risk Factor Screenings with Interventions:     General Health:  General  In general, how would you say your health is?: Fair  In the past 7 days, have you experienced any of the following?  New or Increased Pain, New or Increased Fatigue, Loneliness, Social Isolation, Stress or Anger?: None of These  Do you get the social and emotional support that you need?: Yes  Do you have a Living Will?: (!) No  General Health Risk Interventions:  · none    Health Habits/Nutrition:  Health Habits/Nutrition  Do you exercise for at least 20 minutes 2-3 times per week?: (!) No  Have you lost any weight without trying in the past 3 months?: No  Do you eat fewer than 2 meals per day?: (!) Yes  Have you seen a dentist within the past year?: (!) No  There is no height or weight on file to calculate BMI.   Health Habits/Nutrition Interventions:  · Inadequate physical activity:  patient agrees to exercise for at least 150 minutes/week  · Nutritional issues:  Discussed plant base food    Hearing/Vision:  No exam data present  Hearing/Vision  Do you or your family notice any trouble with your hearing?: No  Do you have difficulty driving, watching TV, or doing any of your daily activities because of your eyesight?: No  Have you had an eye exam within the past year?: (!) No  Hearing/Vision Interventions:  · none    Personalized Preventive Plan   Current Health Maintenance Status  Immunization History   Administered Date(s) Administered    Influenza 11/27/2013    Influenza Virus Vaccine 11/27/2013, 12/11/2014, 09/30/2015, 11/08/2016    Influenza, Edwina Darius, IM, (6 mo and older Fluzone, Flulaval, Fluarix and 3 yrs and older Afluria) 11/08/2016, 11/22/2019    Influenza, Quadv, IM, PF (6 mo and older Fluzone, Flulaval, Fluarix, and 3 yrs and older Afluria) 01/23/2018, 10/25/2018    Pneumococcal Conjugate 13-valent ([id number]) 11/22/2019    Pneumococcal Polysaccharide ([id number]) 11/27/2013        Health Maintenance   Topic Date Due    DTaP/Tdap/Td vaccine (1 - Tdap) 10/12/1973    Shingles Vaccine (1 of 2) 10/12/2004    Annual Wellness Visit (AWV)  05/29/2019    PSA counseling  10/25/2019    Flu vaccine (1) 09/01/2020    Pneumococcal 65+ years Vaccine (2 of 2 - PPSV23) 11/22/2020    A1C test (Diabetic or Prediabetic)  06/03/2021    Lipid screen  06/03/2021    Colon cancer screen colonoscopy  01/23/2028    AAA screen  Completed    Hepatitis C individual homes. --Rashmi Castillo MD on 7/21/2020 at 10:25 AM    An electronic signature was used to authenticate this note.

## 2020-07-21 NOTE — PATIENT INSTRUCTIONS
the tests and screenings are paid in full while other may be subject to a deductible, co-insurance, and/or copay. Some of these benefits include a comprehensive review of your medical history including lifestyle, illnesses that may run in your family, and various assessments and screenings as appropriate. After reviewing your medical record and screening and assessments performed today your provider may have ordered immunizations, labs, imaging, and/or referrals for you. A list of these orders (if applicable) as well as your Preventive Care list are included within your After Visit Summary for your review. Other Preventive Recommendations:    A preventive eye exam performed by an eye specialist is recommended every 1-2 years to screen for glaucoma; cataracts, macular degeneration, and other eye disorders. A preventive dental visit is recommended every 6 months. Try to get at least 150 minutes of exercise per week or 10,000 steps per day on a pedometer . Order or download the FREE \"Exercise & Physical Activity: Your Everyday Guide\" from The [employer]. Call 9-527.220.9595 or search The [employer] online. You need 6890-6877 mg of calcium and 8731-2028 IU of vitamin D per day. It is possible to meet your calcium requirement with diet alone, but a vitamin D supplement is usually necessary to meet this goal.  When exposed to the sun, use a sunscreen that protects against both UVA and UVB radiation with an SPF of 30 or greater. Reapply every 2 to 3 hours or after sweating, drying off with a towel, or swimming. Always wear a seat belt when traveling in a car. Always wear a helmet when riding a bicycle or motorcycle. Personalized Preventive Plan for Jame Sprague - 7/21/2020  Medicare offers a range of preventive health benefits. Some of the tests and screenings are paid in full while other may be subject to a deductible, co-insurance, and/or copay.     Some of these benefits include a comprehensive review of your medical history including lifestyle, illnesses that may run in your family, and various assessments and screenings as appropriate. After reviewing your medical record and screening and assessments performed today your provider may have ordered immunizations, labs, imaging, and/or referrals for you. A list of these orders (if applicable) as well as your Preventive Care list are included within your After Visit Summary for your review. Other Preventive Recommendations:    A preventive eye exam performed by an eye specialist is recommended every 1-2 years to screen for glaucoma; cataracts, macular degeneration, and other eye disorders. A preventive dental visit is recommended every 6 months. Try to get at least 150 minutes of exercise per week or 10,000 steps per day on a pedometer . Order or download the FREE \"Exercise & Physical Activity: Your Everyday Guide\" from The [employer]. Call 9-339.419.3840 or search The [employer] online. You need 6299-1153 mg of calcium and 5744-3947 IU of vitamin D per day. It is possible to meet your calcium requirement with diet alone, but a vitamin D supplement is usually necessary to meet this goal.  When exposed to the sun, use a sunscreen that protects against both UVA and UVB radiation with an SPF of 30 or greater. Reapply every 2 to 3 hours or after sweating, drying off with a towel, or swimming. Always wear a seat belt when traveling in a car. Always wear a helmet when riding a bicycle or motorcycle. Personalized Preventive Plan for [facility] - 7/21/2020  Medicare offers a range of preventive health benefits. Some of the tests and screenings are paid in full while other may be subject to a deductible, co-insurance, and/or copay.     Some of these benefits include a comprehensive review of your medical history including lifestyle, illnesses that may run in your family, and various assessments and screenings as appropriate. After reviewing your medical record and screening and assessments performed today your provider may have ordered immunizations, labs, imaging, and/or referrals for you. A list of these orders (if applicable) as well as your Preventive Care list are included within your After Visit Summary for your review. Other Preventive Recommendations:    A preventive eye exam performed by an eye specialist is recommended every 1-2 years to screen for glaucoma; cataracts, macular degeneration, and other eye disorders. A preventive dental visit is recommended every 6 months. Try to get at least 150 minutes of exercise per week or 10,000 steps per day on a pedometer . Order or download the FREE \"Exercise & Physical Activity: Your Everyday Guide\" from The [employer]. Call 2-485.583.5553 or search The [employer] online. You need 8540-7554 mg of calcium and 0384-1704 IU of vitamin D per day. It is possible to meet your calcium requirement with diet alone, but a vitamin D supplement is usually necessary to meet this goal.  When exposed to the sun, use a sunscreen that protects against both UVA and UVB radiation with an SPF of 30 or greater. Reapply every 2 to 3 hours or after sweating, drying off with a towel, or swimming. Always wear a seat belt when traveling in a car. Always wear a helmet when riding a bicycle or motorcycle. Personalized Preventive Plan for Charis Arreolaow - 7/21/2020  Medicare offers a range of preventive health benefits. Some of the tests and screenings are paid in full while other may be subject to a deductible, co-insurance, and/or copay. Some of these benefits include a comprehensive review of your medical history including lifestyle, illnesses that may run in your family, and various assessments and screenings as appropriate.     After reviewing your medical record and screening and assessments performed today your provider may have ordered immunizations, labs, imaging, and/or referrals for you. A list of these orders (if applicable) as well as your Preventive Care list are included within your After Visit Summary for your review. Other Preventive Recommendations:    A preventive eye exam performed by an eye specialist is recommended every 1-2 years to screen for glaucoma; cataracts, macular degeneration, and other eye disorders. A preventive dental visit is recommended every 6 months. Try to get at least 150 minutes of exercise per week or 10,000 steps per day on a pedometer . Order or download the FREE \"Exercise & Physical Activity: Your Everyday Guide\" from The [employer]. Call 4-631.660.6646 or search The [employer] online. You need 2941-5754 mg of calcium and 9182-3430 IU of vitamin D per day. It is possible to meet your calcium requirement with diet alone, but a vitamin D supplement is usually necessary to meet this goal.  When exposed to the sun, use a sunscreen that protects against both UVA and UVB radiation with an SPF of 30 or greater. Reapply every 2 to 3 hours or after sweating, drying off with a towel, or swimming. Always wear a seat belt when traveling in a car. Always wear a helmet when riding a bicycle or motorcycle. Personalized Preventive Plan for [facility] - 7/21/2020  Medicare offers a range of preventive health benefits. Some of the tests and screenings are paid in full while other may be subject to a deductible, co-insurance, and/or copay. Some of these benefits include a comprehensive review of your medical history including lifestyle, illnesses that may run in your family, and various assessments and screenings as appropriate. After reviewing your medical record and screening and assessments performed today your provider may have ordered immunizations, labs, imaging, and/or referrals for you.   A list of these orders (if applicable) as well as your Preventive Care list are included within your After Visit Summary for your review. Other Preventive Recommendations:    A preventive eye exam performed by an eye specialist is recommended every 1-2 years to screen for glaucoma; cataracts, macular degeneration, and other eye disorders. A preventive dental visit is recommended every 6 months. Try to get at least 150 minutes of exercise per week or 10,000 steps per day on a pedometer . Order or download the FREE \"Exercise & Physical Activity: Your Everyday Guide\" from The [employer]. Call 2-254.428.6242 or search The [employer] online. You need 9669-7776 mg of calcium and 3349-0688 IU of vitamin D per day. It is possible to meet your calcium requirement with diet alone, but a vitamin D supplement is usually necessary to meet this goal.  When exposed to the sun, use a sunscreen that protects against both UVA and UVB radiation with an SPF of 30 or greater. Reapply every 2 to 3 hours or after sweating, drying off with a towel, or swimming. Always wear a seat belt when traveling in a car. Always wear a helmet when riding a bicycle or motorcycle. Patient was put on a wait list and will be contacted to schedule their next follow up appointment once the schedule is available. If the patient is in need of an appointment before their next visit please call the office at [phone]. After Visit Summary  mailed to patient.     KAY

## 2020-07-27 RX ORDER — ATORVASTATIN CALCIUM 20 MG/1
20 TABLET, FILM COATED ORAL DAILY
Qty: 90 TABLET | Refills: 1 | Status: SHIPPED | OUTPATIENT
Start: 2020-07-27 | End: 2020-07-31

## 2020-07-27 NOTE — TELEPHONE ENCOUNTER
angina pectoris [facility])     Prostate cancer adenocarcinoma. Bess stage 6 staus post laparoscopic prostatectomy 2009.      History of right  inguinal hernia repair     Stage 3 severe COPD by GOLD classification ([address]     Erectile dysfunction     Hyperlipidemia     CKD (chronic kidney disease)     Prediabetes     History of colon polyps     CAD in native artery     Iron deficiency anemia secondary to inadequate dietary iron intake     Malabsorption

## 2020-07-31 RX ORDER — AMLODIPINE BESYLATE 2.5 MG/1
2.5 TABLET ORAL DAILY
Qty: 90 TABLET | Refills: 0 | Status: SHIPPED | OUTPATIENT
Start: 2020-07-31 | End: 2020-12-18 | Stop reason: SDUPTHER

## 2020-07-31 RX ORDER — ATORVASTATIN CALCIUM 20 MG/1
20 TABLET, FILM COATED ORAL DAILY
Qty: 90 TABLET | Refills: 1 | Status: SHIPPED | OUTPATIENT
Start: 2020-07-31 | End: 2020-12-10 | Stop reason: SDUPTHER

## 2020-07-31 NOTE — TELEPHONE ENCOUNTER
Request for atorvastatin and amlodipine . Next Visit Date: Return in about 6 months (around 1/9/2021). Future Appointments   Date Time Provider Nando Stallings   8/3/2020 10:30 AM ST[facility] ULTRASOUND RM 1 [facility] US [facility] Radiolog   10/22/2020  1:15 PM SCHEDULE, [facility]   10/29/2020 11:00 AM Stefania Bryant MD [facility]       Health Maintenance   Topic Date Due    DTaP/Tdap/Td vaccine (1 - Tdap) 10/12/1973    Shingles Vaccine (1 of 2) 10/12/2004    PSA counseling  10/25/2019    Flu vaccine (1) 09/01/2020    Pneumococcal 65+ years Vaccine (2 of 2 - PPSV23) 11/22/2020    A1C test (Diabetic or Prediabetic)  06/03/2021    Lipid screen  06/03/2021    Annual Wellness Visit (AWV)  07/22/2021    Colon cancer screen colonoscopy  01/23/2028    AAA screen  Completed    Hepatitis C screen  Completed    HIV screen  Completed    Hepatitis A vaccine  Aged Out    Hepatitis B vaccine  Aged Out    Hib vaccine  Aged Out    Meningococcal (ACWY) vaccine  Aged Out       Hemoglobin A1C (%)   Date Value   06/03/2020 6.3 (H)   06/04/2019 4.7   07/24/2018 5.9             ( goal A1C is < 7)   Microalb/Crt.  Ratio (mcg/mg creat)   Date Value   07/27/2018 CANNOT BE CALCULATED     LDL Cholesterol (mg/dL)   Date Value   06/03/2020 51       (goal LDL is <100)   AST (U/L)   Date Value   06/03/2020 15     ALT (U/L)   Date Value   06/03/2020 11     BUN (mg/dL)   Date Value   07/06/2020 11     BP Readings from Last 3 Encounters:   07/09/20 126/87   04/21/20 106/82   11/22/19 112/77          (goal 120/80)    All Future Testing planned in [facility]  Lab Frequency Next Occurrence   PSA, Diagnostic Once 10/20/2020   US RENAL COMPLETE Once 10/24/2020   Urinalysis with Microscopic Once 10/10/2020   Transfuse RBC Transfusion          Patient Active Problem List:     HTN (hypertension)     mild Cardiomyopathy-ischemic ejection fraction 40-45%      Coronary artery disease involving native coronary artery of

## 2020-08-03 ENCOUNTER — HOSPITAL ENCOUNTER (OUTPATIENT)
Dept: ULTRASOUND IMAGING | Age: 65
Discharge: HOME | End: 2020-08-05
Payer: MEDICARE

## 2020-08-03 PROCEDURE — 76770 US EXAM ABDO BACK WALL COMP: CPT

## 2020-08-07 ENCOUNTER — HOSPITAL ENCOUNTER (OUTPATIENT)
Age: 65
Setting detail: SPECIMEN
Discharge: HOME | End: 2020-08-07
Payer: MEDICARE

## 2020-08-07 LAB
-: ABNORMAL
AMORPHOUS: ABNORMAL
BACTERIA: ABNORMAL
BILIRUBIN URINE: NEGATIVE
CASTS UA: ABNORMAL /LPF (ref 0–8)
COLOR: YELLOW
CRYSTALS, UA: ABNORMAL /HPF
EPITHELIAL CELLS UA: ABNORMAL /HPF (ref 0–5)
GLUCOSE URINE: NEGATIVE
KETONES, URINE: NEGATIVE
LEUKOCYTE ESTERASE, URINE: ABNORMAL
MUCUS: ABNORMAL
NITRITE, URINE: POSITIVE
OTHER OBSERVATIONS UA: ABNORMAL
PH UA: 5 (ref 5–8)
PROSTATE SPECIFIC ANTIGEN: <0.01 UG/L
PROTEIN UA: NEGATIVE
RBC UA: ABNORMAL /HPF (ref 0–4)
RENAL EPITHELIAL, UA: ABNORMAL /HPF
SPECIFIC GRAVITY UA: 1.02 (ref 1–1.03)
TRICHOMONAS: ABNORMAL
TURBIDITY: ABNORMAL
URINE HGB: ABNORMAL
UROBILINOGEN, URINE: NORMAL
WBC UA: ABNORMAL /HPF (ref 0–5)
YEAST: ABNORMAL

## 2020-08-13 ENCOUNTER — OFFICE VISIT (OUTPATIENT)
Dept: INTERNAL MEDICINE | Age: 65
End: 2020-08-13
Payer: MEDICARE

## 2020-08-13 VITALS
DIASTOLIC BLOOD PRESSURE: 88 MMHG | WEIGHT: 150.8 LBS | HEART RATE: 88 BPM | HEIGHT: 72 IN | SYSTOLIC BLOOD PRESSURE: 126 MMHG | BODY MASS INDEX: 20.42 KG/M2

## 2020-08-13 LAB
BILIRUBIN, POC: ABNORMAL
BLOOD URINE, POC: ABNORMAL
CLARITY, POC: ABNORMAL
COLOR, POC: ABNORMAL
GLUCOSE URINE, POC: ABNORMAL
KETONES, POC: ABNORMAL
LEUKOCYTE EST, POC: ABNORMAL
NITRITE, POC: POSITIVE
PH, POC: 5.5
PROTEIN, POC: ABNORMAL
SPECIFIC GRAVITY, POC: >=1.03
UROBILINOGEN, POC: 0.2

## 2020-08-13 PROCEDURE — G8420 CALC BMI NORM PARAMETERS: HCPCS | Performed by: STUDENT IN AN ORGANIZED HEALTH CARE EDUCATION/TRAINING PROGRAM

## 2020-08-13 PROCEDURE — 4040F PNEUMOC VAC/ADMIN/RCVD: CPT | Performed by: STUDENT IN AN ORGANIZED HEALTH CARE EDUCATION/TRAINING PROGRAM

## 2020-08-13 PROCEDURE — 3017F COLORECTAL CA SCREEN DOC REV: CPT | Performed by: STUDENT IN AN ORGANIZED HEALTH CARE EDUCATION/TRAINING PROGRAM

## 2020-08-13 PROCEDURE — 1123F ACP DISCUSS/DSCN MKR DOCD: CPT | Performed by: STUDENT IN AN ORGANIZED HEALTH CARE EDUCATION/TRAINING PROGRAM

## 2020-08-13 PROCEDURE — G8427 DOCREV CUR MEDS BY ELIG CLIN: HCPCS | Performed by: STUDENT IN AN ORGANIZED HEALTH CARE EDUCATION/TRAINING PROGRAM

## 2020-08-13 PROCEDURE — 81003 URINALYSIS AUTO W/O SCOPE: CPT | Performed by: STUDENT IN AN ORGANIZED HEALTH CARE EDUCATION/TRAINING PROGRAM

## 2020-08-13 PROCEDURE — 99213 OFFICE O/P EST LOW 20 MIN: CPT | Performed by: STUDENT IN AN ORGANIZED HEALTH CARE EDUCATION/TRAINING PROGRAM

## 2020-08-13 PROCEDURE — 1036F TOBACCO NON-USER: CPT | Performed by: STUDENT IN AN ORGANIZED HEALTH CARE EDUCATION/TRAINING PROGRAM

## 2020-08-13 PROCEDURE — 99211 OFF/OP EST MAY X REQ PHY/QHP: CPT | Performed by: INTERNAL MEDICINE

## 2020-08-13 RX ORDER — NITROFURANTOIN 25; 75 MG/1; MG/1
100 CAPSULE ORAL 2 TIMES DAILY
Qty: 14 CAPSULE | Refills: 0 | Status: SHIPPED | OUTPATIENT
Start: 2020-08-13 | End: 2020-08-20

## 2020-08-13 NOTE — PATIENT INSTRUCTIONS
Return To Clinic around 01/21/2021 for 6 month follow up. Patient was added to wait list. Patient will be contacted by office to schedule upcoming appointment with the physician. After Visit Summary given and reviewed. The medication list included in this document is our record of what you are currently taking, including any changes that were made at today's visit. If you find any differences when compared to your medications at home, or have any questions that were not answered at your visit, please contact the office. It is very important for your care that you keep your appointment. If for some reason you are unable to keep your appointment, it is equally important that you call our office at [phone] to cancel your appointment and reschedule. Failure to do so may result in your termination from our practice. Referral for Urology sent to [facility]. Specialty office will contact patient for appointment. A copy of referral with contact information and address given to patient. Patient should contact specialist office if no contact has been made to patient within 1 week. Script for Tdap and Shingrix was printed and given to patient.  Patient was instructed to take script(s) to pharmacy to get filled.    -CAIO Shea

## 2020-08-13 NOTE — PROGRESS NOTES
Attending Physician Statement  I have discussed the care of Tres Cardona, including pertinent history and exam findings with the resident. I have reviewed the key elements of all parts of the encounter with the resident. I agree with the assessment, and status of the problem list as documented. Diagnosis Orders   1. Acute cystitis without hematuria  POCT Urinalysis No Micro (Auto)    nitrofurantoin, macrocrystal-monohydrate, (MACROBID) 100 MG capsule   2. Need for prophylactic vaccination against diphtheria-tetanus-pertussis (DTP)  Tdap (ADACEL) 5-2-15.5 LF-MCG/0.5 injection   3. Need for prophylactic vaccination and inoculation against varicella  zoster recombinant adjuvanted vaccine (SHINGRIX) 50 MCG/0.5ML SUSR injection   4. H/O prostate cancer  [facility], [facility]      The plan and orders should include   Orders Placed This Encounter   Procedures   [address] [facility], [facility]    POCT Urinalysis No Micro (Auto)    and this was also documented by the resident. I agree with the referral to urology. The medication list was reviewed with the resident and is up to date.      Dr Reshma Dumont MD, [facility] , Department of Internal Medicine  Resident Ambulatory Site Medical Director  [address] [facility] Internal Medicine  [address] [facility]  Internal Medicine Clerkship - Chang Gutierrez    8/13/2020, 12:25 PM

## 2020-08-13 NOTE — PROGRESS NOTES
[facility]    Progress Note    Date of patient's visit: 8/13/2020  YOB: 1955  Patient's Name:  Jennifer Boudreaux    Patient Care Team:  Christiano Jane MD as PCP - General (Internal Medicine)  Christiano Jane MD as PCP - [facility]  LINO Bauer - CNP as Nurse Practitioner (Cardiology)  Joseph Aguirre RN as   Sierra Estes MD as Consulting Physician (Gastroenterology)    REASON FOR VISIT: Routine outpatient follow     HISTORY OF PRESENT ILLNESS:    History was obtained from the patient. Jennifer Boudreaux is a 71 y.o. is here for a follow-up visit. Patient was seen last in the office on 7/9/2020. Patient had no particular concerns today. He denied any fever, chills, cough, shortness of breath, UTI symptoms. Patient is compliant with all his medications  Has history of hypertension, blood pressure today 126/88. Patient has history of CAD status post percutaneous angioplasty- on aspirin Plavix. Patient reported that he recently followed up with his cardiologist.  Has history of prostate adenocarcinoma 10 years ago, Bess stage 6 status post laparoscopic prostatectomy done in 2009. PSA <0.01 in 7/2020. Has history of CKD stage III. Pre-diabetes, HbA1c 6.3 on 6/3/2020        Patient Active Problem List   Diagnosis    HTN (hypertension)    mild Cardiomyopathy-ischemic ejection fraction 40-45%     Coronary artery disease involving native coronary artery of native heart with angina pectoris ([address]    Prostate cancer adenocarcinoma. [place] stage 6 staus post laparoscopic prostatectomy 2009.     History of right  inguinal hernia repair    Stage 3 severe COPD by GOLD classification ([address]    Erectile dysfunction    Hyperlipidemia    CKD (chronic kidney disease)    Prediabetes    History of colon polyps    CAD in native artery    Iron deficiency anemia secondary to inadequate dietary iron intake    Malabsorption ALLERGIES      Allergies   Allergen Reactions    Phenytoin Sodium Extended Rash         MEDICATIONS:      Current Outpatient Medications on File Prior to Visit   Medication Sig Dispense Refill    atorvastatin (LIPITOR) 20 MG tablet TAKE 1 TABLET BY MOUTH DAILY 90 tablet 1    docusate sodium (COLACE) 100 MG capsule Take 100 mg by mouth 2 times daily      pantoprazole (PROTONIX) 40 MG tablet Take 1 tablet by mouth daily 90 tablet 3    amLODIPine (NORVASC) 5 MG tablet Take 1 tablet by mouth daily 30 tablet 2    isosorbide mononitrate (IMDUR) 30 MG extended release tablet TAKE 1 TABLET BY MOUTH EVERY DAY 90 tablet 1    tiotropium (SPIRIVA HANDIHALER) 18 MCG inhalation capsule Once daily 30 capsule 5    budesonide-formoterol (SYMBICORT) 80-4.5 MCG/ACT AERO Inhale 2 puffs into the lungs 2 times daily 2 Inhaler 5    albuterol sulfate HFA (PROAIR HFA) 108 (90 Base) MCG/ACT inhaler Inhale 2 puffs into the lungs every 6 hours as needed for Wheezing 1 Inhaler 3    metoprolol tartrate (LOPRESSOR) 25 MG tablet Take 1 tablet by mouth 2 times daily 180 tablet 1    clopidogrel (PLAVIX) 75 MG tablet Take 1 tablet by mouth daily 90 tablet 1    nitroGLYCERIN (NITROSTAT) 0.3 MG SL tablet DISSOLVE 1 TABLET UNDER THE TONGUE EVERY 5 MINUTES AS NEEDED FOR CHEST PAIN UNTIL RELIEF IS OBTAINED, IF PAIN PERSISTS, CALL 911 100 tablet 0    ranolazine (RANEXA) 500 MG extended release tablet Take 500 mg by mouth 2 times daily      aspirin 81 MG chewable tablet Take 1 tablet by mouth daily 30 tablet 3    amLODIPine (NORVASC) 2.5 MG tablet TAKE 1 TABLET BY MOUTH DAILY (Patient not taking: Reported on 8/13/2020) 90 tablet 0     No current facility-administered medications on file prior to visit. SOCIAL HISTORY    Reviewed and no change from previous record. Lindsey España  reports that he quit smoking about 22 months ago. His smoking use included cigarettes. He has a 25.00 pack-year smoking history.  He has never used smokeless tobacco.    FAMILY HISTORY:    Reviewed and No change from previous visit    REVIEW OF SYSTEMS:    ENT: negative  Respiratory: no cough, shortness of breath, + wheezing  Cardiovascular: no chest pain or dyspnea on exertion  Gastrointestinal: no abdominal pain, black or bloody stools  Neurological: no TIA or stroke symptoms  Endocrine: negative  Genito-Urinary: no dysuria, trouble voiding, or hematuria  Musculoskeletal: negative  Dermatological: negative    PHYSICAL EXAM:      Vitals:    08/13/20 0842   BP: 126/88   Pulse: 88     General appearance - alert, well appearing, and in no distress  Mental status - alert, oriented to person, place, and time  Chest -=wheezing bilaterally  Heart - normal rate, regular rhythm, normal S1, S2, no murmurs, rubs, clicks or gallops  Abdomen - soft, nontender, nondistended, no masses or organomegaly  Neurological - alert, oriented, normal speech, no focal findings or movement disorder noted  Musculoskeletal - no joint tenderness, deformity or swelling  Extremities - peripheral pulses normal, no pedal edema, no clubbing or cyanosis  Skin - normal coloration and turgor, no rashes, no suspicious skin lesions noted    LABORATORY FINDINGS:    CBC:  Lab Results   Component Value Date    WBC 6.3 04/14/2020    HGB 13.4 04/14/2020     04/14/2020     BMP:    Lab Results   Component Value Date     07/06/2020    K 4.0 07/06/2020     07/06/2020    CO2 24 07/06/2020    BUN 11 07/06/2020    CREATININE 1.33 07/06/2020    GLUCOSE 96 07/06/2020     HEMOGLOBIN A1C:   Lab Results   Component Value Date    LABA1C 6.3 06/03/2020     MICROALBUMIN URINE:   Lab Results   Component Value Date    MICROALBUR <12 07/27/2018     FASTING LIPID PANEL:  Lab Results   Component Value Date    CHOL 104 06/03/2020    HDL 36 (L) 06/03/2020    TRIG 85 06/03/2020     LIVER PROFILE:  Lab Results   Component Value Date    ALT 11 06/03/2020    AST 15 06/03/2020    PROT 7.1 06/03/2020    BILITOT 0.40 06/03/2020    BILIDIR <0.08 07/26/2016    LABALBU 4.2 06/03/2020      THYROID FUNCTION:   Lab Results   Component Value Date    TSH 1.53 10/25/2018      URINE ANALYSIS: No results found for: LABURIN  ASSESSMENT AND PLAN:    1. Need for prophylactic vaccination against diphtheria-tetanus-pertussis (DTP)    - Tdap (ADACEL) 5-2-15.5 LF-MCG/0.5 injection; Inject 0.5 mLs into the muscle once for 1 dose  Dispense: 0.5 mL; Refill: 0    2. Need for prophylactic vaccination and inoculation against varicella    - zoster recombinant adjuvanted vaccine (SHINGRIX) 50 MCG/0.5ML SUSR injection; 50 MCG IM then repeat 2-6 months. Dispense: 0.5 mL; Refill: 1    3. Acute cystitis without hematuria  macrobid 100 twice daily for 7 days    4. H/O prostate cancer  Follow-up with urology      FOLLOW UP:       1. Chavez Macias received counseling on the following healthy behaviors: exercise and medication adherence  2. Patient given educational materials - see patient instructions  3. Discussed use, benefit, and side effects of prescribed medications. Barriers to medication compliance addressed. All patient questions answered. Pt voiced understanding. 4.  Reviewed prior labs and health maintenance  5. Continue current medications, diet and exercise.       Abiodun Zuluaga MD  Internal Medicine,  [facility]      8/13/2020, 10:02 AM

## 2020-10-22 ENCOUNTER — HOSPITAL ENCOUNTER (OUTPATIENT)
Facility: MEDICAL CENTER | Age: 65
Discharge: HOME | End: 2020-10-22
Payer: MEDICARE

## 2020-10-22 DIAGNOSIS — D50.8 IRON DEFICIENCY ANEMIA SECONDARY TO INADEQUATE DIETARY IRON INTAKE: ICD-10-CM

## 2020-10-22 DIAGNOSIS — D50.0 IRON DEFICIENCY ANEMIA DUE TO CHRONIC BLOOD LOSS: ICD-10-CM

## 2020-10-22 DIAGNOSIS — K90.89 OTHER SPECIFIED INTESTINAL MALABSORPTION: ICD-10-CM

## 2020-10-22 LAB
ABSOLUTE EOS #: 1.04 K/UL (ref 0–0.44)
ABSOLUTE IMMATURE GRANULOCYTE: 0.03 K/UL (ref 0–0.3)
ABSOLUTE LYMPH #: 2.97 K/UL (ref 1.1–3.7)
ABSOLUTE MONO #: 0.81 K/UL (ref 0.1–1.2)
BASOPHILS # BLD: 1 % (ref 0–2)
BASOPHILS ABSOLUTE: 0.07 K/UL (ref 0–0.2)
DIFFERENTIAL TYPE: ABNORMAL
EOSINOPHILS RELATIVE PERCENT: 14 % (ref 1–4)
FERRITIN: 75 UG/L (ref 30–400)
HCT VFR BLD CALC: 48.3 % (ref 40.7–50.3)
HEMOGLOBIN: 14.7 G/DL (ref 13–17)
IMMATURE GRANULOCYTES: 0 %
IRON SATURATION: 70 % (ref 20–55)
IRON: 184 UG/DL (ref 59–158)
LYMPHOCYTES # BLD: 40 % (ref 24–43)
MCH RBC QN AUTO: 28.1 PG (ref 25.2–33.5)
MCHC RBC AUTO-ENTMCNC: 30.4 G/DL (ref 28.4–34.8)
MCV RBC AUTO: 92.4 FL (ref 82.6–102.9)
MONOCYTES # BLD: 11 % (ref 3–12)
NRBC AUTOMATED: 0 PER 100 WBC
PDW BLD-RTO: 13.3 % (ref 11.8–14.4)
PLATELET # BLD: 299 K/UL (ref 138–453)
PLATELET ESTIMATE: ABNORMAL
PMV BLD AUTO: 9.2 FL (ref 8.1–13.5)
RBC # BLD: 5.23 M/UL (ref 4.21–5.77)
RBC # BLD: ABNORMAL 10*6/UL
SEG NEUTROPHILS: 34 % (ref 36–65)
SEGMENTED NEUTROPHILS ABSOLUTE COUNT: 2.54 K/UL (ref 1.5–8.1)
TOTAL IRON BINDING CAPACITY: 261 UG/DL (ref 250–450)
UNSATURATED IRON BINDING CAPACITY: 77 UG/DL (ref 112–347)
WBC # BLD: 7.5 K/UL (ref 3.5–11.3)
WBC # BLD: ABNORMAL 10*3/UL

## 2020-10-22 PROCEDURE — 36415 COLL VENOUS BLD VENIPUNCTURE: CPT

## 2020-10-22 PROCEDURE — 83540 ASSAY OF IRON: CPT

## 2020-10-22 PROCEDURE — 83550 IRON BINDING TEST: CPT

## 2020-10-22 PROCEDURE — 85025 COMPLETE CBC W/AUTO DIFF WBC: CPT

## 2020-10-22 PROCEDURE — 82728 ASSAY OF FERRITIN: CPT

## 2020-10-23 ENCOUNTER — HOSPITAL ENCOUNTER (OUTPATIENT)
Facility: MEDICAL CENTER | Age: 65
End: 2020-10-23
Payer: MEDICARE

## 2020-10-28 RX ORDER — DOCUSATE SODIUM 100 MG/1
CAPSULE, LIQUID FILLED ORAL
Qty: 60 CAPSULE | Refills: 3 | Status: SHIPPED | OUTPATIENT
Start: 2020-10-28 | End: 2021-03-18 | Stop reason: SDUPTHER

## 2020-10-29 ENCOUNTER — OFFICE VISIT (OUTPATIENT)
Dept: ONCOLOGY | Age: 65
End: 2020-10-29
Payer: MEDICARE

## 2020-10-29 ENCOUNTER — TELEPHONE (OUTPATIENT)
Dept: ONCOLOGY | Age: 65
End: 2020-10-29

## 2020-10-29 VITALS
DIASTOLIC BLOOD PRESSURE: 55 MMHG | TEMPERATURE: 98.1 F | WEIGHT: 144.9 LBS | SYSTOLIC BLOOD PRESSURE: 101 MMHG | BODY MASS INDEX: 19.65 KG/M2 | HEART RATE: 92 BPM

## 2020-10-29 PROCEDURE — G8420 CALC BMI NORM PARAMETERS: HCPCS | Performed by: INTERNAL MEDICINE

## 2020-10-29 PROCEDURE — G8484 FLU IMMUNIZE NO ADMIN: HCPCS | Performed by: INTERNAL MEDICINE

## 2020-10-29 PROCEDURE — 3017F COLORECTAL CA SCREEN DOC REV: CPT | Performed by: INTERNAL MEDICINE

## 2020-10-29 PROCEDURE — 1036F TOBACCO NON-USER: CPT | Performed by: INTERNAL MEDICINE

## 2020-10-29 PROCEDURE — 99214 OFFICE O/P EST MOD 30 MIN: CPT | Performed by: INTERNAL MEDICINE

## 2020-10-29 PROCEDURE — G8427 DOCREV CUR MEDS BY ELIG CLIN: HCPCS | Performed by: INTERNAL MEDICINE

## 2020-10-29 PROCEDURE — 1123F ACP DISCUSS/DSCN MKR DOCD: CPT | Performed by: INTERNAL MEDICINE

## 2020-10-29 PROCEDURE — 4040F PNEUMOC VAC/ADMIN/RCVD: CPT | Performed by: INTERNAL MEDICINE

## 2020-10-29 PROCEDURE — 99211 OFF/OP EST MAY X REQ PHY/QHP: CPT | Performed by: INTERNAL MEDICINE

## 2020-10-29 NOTE — TELEPHONE ENCOUNTER
Erika Dillon MD VISIT  DR Lily Al IN TO SEE PATIENT  ORDERS RECEIVED  RV 6 MONTHS W/LABS PRIOR  LABS CDP FE TIBC FERRITIN 04/22/21  MD VISIT 04/29/21 @11AM  AVS PRINTED AND GIVEN TO PATIENT WITH INSTRUCTIONS  PATIENT DISCHARGED AMBULATORY

## 2020-10-30 RX ORDER — BUDESONIDE AND FORMOTEROL FUMARATE DIHYDRATE 80; 4.5 UG/1; UG/1
2 AEROSOL RESPIRATORY (INHALATION) 2 TIMES DAILY
Qty: 2 INHALER | Refills: 5 | Status: SHIPPED | OUTPATIENT
Start: 2020-10-30 | End: 2021-07-13

## 2020-10-30 NOTE — TELEPHONE ENCOUNTER
Pt requesting medication refill, atorvastatin  Next Visit Date:pt on wait list til 1/9/21  Future Appointments   Date Time Provider Nando Stallings   4/22/2021 12:30 PM SCHEDULE, [facility]   4/29/2021 11:00 AM Dory Moscoso MD [facility]       Health Maintenance   Topic Date Due    Shingles Vaccine (1 of 2) 10/12/2004    Pneumococcal 65+ years Vaccine (2 of 2 - PPSV23) 11/22/2020    A1C test (Diabetic or Prediabetic)  06/03/2021    Annual Wellness Visit (AWV)  07/22/2021    PSA counseling  08/07/2021    Lipid screen  06/03/2025    Colon cancer screen colonoscopy  01/23/2028    DTaP/Tdap/Td vaccine (2 - Td) 09/03/2030    Flu vaccine  Completed    AAA screen  Completed    Hepatitis C screen  Completed    Hepatitis A vaccine  Aged Out    Hepatitis B vaccine  Aged Out    Hib vaccine  Aged Out    Meningococcal (ACWY) vaccine  Aged Out       Hemoglobin A1C (%)   Date Value   06/03/2020 6.3 (H)   06/04/2019 4.7   07/24/2018 5.9             ( goal A1C is < 7)   Microalb/Crt. Ratio (mcg/mg creat)   Date Value   07/27/2018 CANNOT BE CALCULATED     LDL Cholesterol (mg/dL)   Date Value   06/03/2020 51   06/04/2019 61       (goal LDL is <100)   AST (U/L)   Date Value   06/03/2020 15     ALT (U/L)   Date Value   06/03/2020 11     BUN (mg/dL)   Date Value   07/06/2020 11     BP Readings from Last 3 Encounters:   10/29/20 (!) 101/55   08/13/20 126/88   07/09/20 126/87          (goal 120/80)    All Future Testing planned in [facility]  Lab Frequency Next Occurrence   Transfuse RBC Transfusion    CBC Auto Differential     Ferritin     Iron And TIBC                 Patient Active Problem List:     HTN (hypertension)     mild Cardiomyopathy-ischemic ejection fraction 40-45%      Coronary artery disease involving native coronary artery of native heart with angina pectoris (HCC)     Prostate cancer adenocarcinoma. [place] stage 6 staus post laparoscopic prostatectomy 2009. History of right  inguinal hernia repair     Stage 3 severe COPD by GOLD classification ([address]     Erectile dysfunction     Hyperlipidemia     CKD (chronic kidney disease)     Prediabetes     History of colon polyps     CAD in native artery     Iron deficiency anemia secondary to inadequate dietary iron intake     Malabsorption

## 2020-11-02 NOTE — PROGRESS NOTES
_        Chief Complaint   Patient presents with    Follow-up    Discuss Labs        DIAGNOSIS:        Iron deficiency anemia secondary to chronic GI blood loss  Status post GI bleeding  Gastric AV malformation     CURRENT THERAPY:         Status post blood transfusion in January 2018  Status post IV iron infusion in January 2018. April 2019. BRIEF CASE HISTORY:      Mr. Zonia Rouse is a very pleasant 76 y.o. male with history of multiple comorbidities as listed. The patient was hospitalized in January 2018 because of extensive weakness and fatigue when he was found to have severe anemia. He had blood transfusion and he had GI workup which showed negative colonoscopy however he had gastric AV malformation which was cauterized. Patient received IV iron during hospitalization and he is maintained on oral iron at the present time. He feels much better. He has mild weakness. No dizziness. Patient has dark stool secondary to oral iron but no hematochezia. No melena or hematemesis. No palpitation. No other complaints. INTERIM HISTORY:   The patient was seen for follow-up anemia. He had GI blood loss in January 2018. He had blood transfusion on iron infusion. He is maintained on oral iron. Tolerated well. No GI bleeding. No melena or hematochezia. No hematemesis. No abdominal pain. He is maintained on aspirin. No clear evidence of active bleeding.      PAST MEDICAL HISTORY: has a past medical history of Alcohol withdrawal seizure ([address] Blood loss anemia, CAD (coronary artery disease), Cardiomyopathy ([address] Chest pain, Chronic kidney disease, CKD (chronic kidney disease), COPD (chronic obstructive pulmonary disease) ([address] Cough, Erectile dysfunction, History of transfusion of packed red blood cells, Hyperlipidemia, Hypertension, Nicotine dependence, Primary adenocarcinoma of prostate ([address] Urinary incontinence, Wears dentures, and Wears glasses. PAST SURGICAL HISTORY: has a past surgical history that includes Cardiac catheterization (2006); Endoscopy, colon, diagnostic; Upper gastrointestinal endoscopy (2008); Prostatectomy (2009); Cardiac catheterization (10/2015); Colonoscopy (2008); Inguinal hernia repair (Right); pr colon ca scrn not hi rsk ind (N/A, 9/19/2017); pr esophagogastroduodenoscopy transoral diagnostic (N/A, 9/19/2017); Upper gastrointestinal endoscopy (09/19/2017); Colonoscopy (09/19/2017); Colonoscopy (1/23/2018); Upper gastrointestinal endoscopy (1/23/2018); and Cardiac catheterization (10/03/2018). CURRENT MEDICATIONS:  has a current medication list which includes the following prescription(s): amlodipine, atorvastatin, pantoprazole, isosorbide mononitrate, spiriva handihaler, albuterol sulfate hfa, metoprolol tartrate, clopidogrel, ranolazine, aspirin, budesonide-formoterol, dok, zoster recombinant adjuvanted vaccine, and nitroglycerin. ALLERGIES:  is allergic to phenytoin sodium extended. FAMILY HISTORY: Negative for any hematological or oncological conditions. SOCIAL HISTORY:  reports that he quit smoking about 2 years ago. His smoking use included cigarettes. He has a 25.00 pack-year smoking history. He has never used smokeless tobacco. He reports previous drug use. Frequency: 1.00 time per week. Drug: Marijuana. He reports that he does not drink alcohol. REVIEW OF SYSTEMS:     · General: No weakness or fatigue. No unanticipated weight loss or decreased appetite. No fever or chills. · Eyes: No blurred vision, eye pain or double vision. · Ears: No hearing problems or drainage. No tinnitus. · Throat: No sore throat, problems with swallowing or dysphagia. · Respiratory: No cough, sputum or hemoptysis. No shortness of breath. No pleuritic chest pain. · Cardiovascular: No chest pain, orthopnea or PND. No lower extremity edema. No palpitation. · Gastrointestinal: As above. · Genitourinary: No dysuria, hematuria, frequency or urgency. · Musculoskeletal: No muscle aches or pains. No limitation of movement. No back pain. No gait disturbance, No joint complaints. · Dermatologic: No skin rashes or pruritus. No skin lesions or discolorations. · Psychiatric: No depression, anxiety, or stress or signs of schizophrenia. No change in mood or affect. · Hematologic: No history of bleeding tendency. No bruises or ecchymosis. No history of clotting problems. · Infectious disease: No fever, chills or frequent infections. · Endocrine: No problems with opacity. No polydipsia or polyuria. No temperature intolerance. · Neurologic: No headaches or dizziness. No weakness or numbness of the extremities. No changes in balance, coordination,  memory, mentation, behavior. · Allergic/Immunologic: No nasal congestion or hives. No repeated infections. PHYSICAL EXAM:  The patient is not in acute distress. Vital signs: Blood pressure (!) 101/55, pulse 92, temperature 98.1 °F (36.7 °C), temperature source Oral, weight 144 lb 14.4 oz (65.7 kg). HEENT:  Eyes are normal. Ears, nose and throat are normal.  Neck: Supple. No lymph node enlargement. No thyroid enlargement. Trachea is centrally located. Chest:  Clear to auscultation. No wheezes or crepitations. Heart: Regular sinus rhythm. Abdomen: Soft, nontender. No hepatosplenomegaly. No masses. Extremities:  With no edema. Lymph Nodes:  No cervical, axillary or inguinal lymph node enlargement. Neurologic:  Conscious and oriented. No focal neurological deficits. Psychosocial: No depression, anxiety or stress. Skin: No rashes, bruises or ecchymoses.       Review of Diagnostic data:   Recent Labs     10/22/20  0952   WBC 7.5   HGB 14.7   HCT 48.3   MCV 92.4        Lab Results   Component Value Date    IRON 184 (H) 10/22/2020    TIBC 261 10/22/2020    FERRITIN 75 10/22/2020         IMPRESSION:

## 2020-12-02 RX ORDER — TIOTROPIUM BROMIDE 18 UG/1
CAPSULE ORAL; RESPIRATORY (INHALATION)
Qty: 30 CAPSULE | Refills: 5 | Status: SHIPPED | OUTPATIENT
Start: 2020-12-02 | End: 2021-03-05

## 2020-12-02 NOTE — TELEPHONE ENCOUNTER
Pt requesting medication refill, apiriva  Next Visit Date:pt on wait list til 1/9/21, last seen 7/9/20  Future Appointments   Date Time Provider Nando Stallings   4/22/2021 12:30 PM SCHEDULE, [facility]   4/29/2021 11:00 AM Taryn Tirado MD [facility]       Health Maintenance   Topic Date Due    Shingles Vaccine (1 of 2) 10/12/2004    Pneumococcal 65+ years Vaccine (2 of 2 - PPSV23) 11/22/2020    A1C test (Diabetic or Prediabetic)  06/03/2021    Annual Wellness Visit (AWV)  07/22/2021    PSA counseling  08/07/2021    Lipid screen  06/03/2025    Colon cancer screen colonoscopy  01/23/2028    DTaP/Tdap/Td vaccine (2 - Td) 09/03/2030    Flu vaccine  Completed    AAA screen  Completed    Hepatitis C screen  Completed    Hepatitis A vaccine  Aged Out    Hepatitis B vaccine  Aged Out    Hib vaccine  Aged Out    Meningococcal (ACWY) vaccine  Aged Out       Hemoglobin A1C (%)   Date Value   06/03/2020 6.3 (H)   06/04/2019 4.7   07/24/2018 5.9             ( goal A1C is < 7)   Microalb/Crt. Ratio (mcg/mg creat)   Date Value   07/27/2018 CANNOT BE CALCULATED     LDL Cholesterol (mg/dL)   Date Value   06/03/2020 51   06/04/2019 61       (goal LDL is <100)   AST (U/L)   Date Value   06/03/2020 15     ALT (U/L)   Date Value   06/03/2020 11     BUN (mg/dL)   Date Value   07/06/2020 11     BP Readings from Last 3 Encounters:   10/29/20 (!) 101/55   08/13/20 126/88   07/09/20 126/87          (goal 120/80)    All Future Testing planned in [facility]  Lab Frequency Next Occurrence   Transfuse RBC Transfusion    CBC Auto Differential     Ferritin     Iron And TIBC                 Patient Active Problem List:     HTN (hypertension)     mild Cardiomyopathy-ischemic ejection fraction 40-45%      Coronary artery disease involving native coronary artery of native heart with angina pectoris (HCC)     Prostate cancer adenocarcinoma.  [place] stage 6 staus post laparoscopic prostatectomy 2009.     History of right  inguinal hernia repair     Stage 3 severe COPD by GOLD classification ([address]     Erectile dysfunction     Hyperlipidemia     CKD (chronic kidney disease)     Prediabetes     History of colon polyps     CAD in native artery     Iron deficiency anemia secondary to inadequate dietary iron intake     Malabsorption

## 2020-12-10 NOTE — TELEPHONE ENCOUNTER
E-scribe request for medication atorvastatin and isosorbide. Pt is on wait list till Feb.     Health Maintenance   Topic Date Due    Shingles Vaccine (1 of 2) 10/12/2004    Pneumococcal 65+ years Vaccine (2 of 2 - PPSV23) 11/22/2020    A1C test (Diabetic or Prediabetic)  06/03/2021    Annual Wellness Visit (AWV)  07/22/2021    PSA counseling  08/07/2021    Lipid screen  06/03/2025    Colon cancer screen colonoscopy  01/23/2028    DTaP/Tdap/Td vaccine (2 - Td) 09/03/2030    Flu vaccine  Completed    AAA screen  Completed    Hepatitis C screen  Completed    Hepatitis A vaccine  Aged Out    Hepatitis B vaccine  Aged Out    Hib vaccine  Aged Out    Meningococcal (ACWY) vaccine  Aged Out             (applicable per patient's age: Cancer Screenings, Depression Screening, Fall Risk Screening, Immunizations)    Hemoglobin A1C (%)   Date Value   06/03/2020 6.3 (H)   06/04/2019 4.7   07/24/2018 5.9     Microalb/Crt.  Ratio (mcg/mg creat)   Date Value   07/27/2018 CANNOT BE CALCULATED     LDL Cholesterol (mg/dL)   Date Value   06/03/2020 51     AST (U/L)   Date Value   06/03/2020 15     ALT (U/L)   Date Value   06/03/2020 11     BUN (mg/dL)   Date Value   07/06/2020 11      (goal A1C is < 7)   (goal LDL is <100) need 30-50% reduction from baseline     BP Readings from Last 3 Encounters:   10/29/20 (!) 101/55   08/13/20 126/88   07/09/20 126/87    (goal /80)      All Future Testing planned in [facility]:  Lab Frequency Next Occurrence   Transfuse RBC Transfusion    CBC Auto Differential     Ferritin     Iron And TIBC         Next Visit Date:  Future Appointments   Date Time Provider Nando Stallings   4/22/2021 12:30 PM SCHEDULE, [facility]  CANCER [facility]   4/29/2021 11:00 AM Dory Moscoso MD  Cancer Ct [facility]            Patient Active Problem List:     HTN (hypertension)     mild Cardiomyopathy-ischemic ejection fraction 40-45%      Coronary artery disease involving native coronary artery of native heart with angina pectoris [facility])     Prostate cancer adenocarcinoma. [place] stage 6 staus post laparoscopic prostatectomy 2009.      History of right  inguinal hernia repair     Stage 3 severe COPD by GOLD classification ([address]     Erectile dysfunction     Hyperlipidemia     CKD (chronic kidney disease)     Prediabetes     History of colon polyps     CAD in native artery     Iron deficiency anemia secondary to inadequate dietary iron intake     Malabsorption

## 2020-12-11 RX ORDER — ATORVASTATIN CALCIUM 20 MG/1
20 TABLET, FILM COATED ORAL DAILY
Qty: 90 TABLET | Refills: 0 | Status: SHIPPED | OUTPATIENT
Start: 2020-12-11 | End: 2020-12-18 | Stop reason: SDUPTHER

## 2020-12-11 RX ORDER — ISOSORBIDE MONONITRATE 30 MG/1
30 TABLET, EXTENDED RELEASE ORAL DAILY
Qty: 90 TABLET | Refills: 0 | Status: SHIPPED | OUTPATIENT
Start: 2020-12-11 | End: 2021-03-15 | Stop reason: SDUPTHER

## 2020-12-18 RX ORDER — ATORVASTATIN CALCIUM 40 MG/1
40 TABLET, FILM COATED ORAL DAILY
Qty: 90 TABLET | Refills: 1 | Status: SHIPPED | OUTPATIENT
Start: 2020-12-18 | End: 2021-06-04

## 2020-12-18 NOTE — TELEPHONE ENCOUNTER
Atorvastatin should be 40 mg. Please inform pharmacy to cancel 20 mg dose. They keep sending that refill. Also please clarify dose of Amlodipine with patient. I see two different doses on file.  Thanks

## 2020-12-18 NOTE — TELEPHONE ENCOUNTER
Patient called stating that he has been taking 40mg of atorvastatin instead of 20mg per what he thought PCP informed him to do. Also patient would like a refill on Amlodipine.

## 2020-12-24 RX ORDER — AMLODIPINE BESYLATE 2.5 MG/1
5 TABLET ORAL DAILY
Qty: 30 TABLET | Refills: 0 | Status: SHIPPED | OUTPATIENT
Start: 2020-12-24 | End: 2021-01-04

## 2020-12-31 RX ORDER — CLOPIDOGREL BISULFATE 75 MG/1
75 TABLET ORAL DAILY
Qty: 90 TABLET | Refills: 1 | Status: SHIPPED | OUTPATIENT
Start: 2020-12-31 | End: 2021-06-28

## 2020-12-31 NOTE — TELEPHONE ENCOUNTER
Lopressor plavix refill    Pt on wait list          Next Visit Date:  Future Appointments   Date Time Provider Nando Stallings   4/22/2021 12:30 PM SCHEDULE, [facility]   4/29/2021 11:00 AM Darlyn Zelaya MD [facility]       Health Maintenance   Topic Date Due    Shingles Vaccine (1 of 2) 10/12/2004    Pneumococcal 65+ years Vaccine (2 of 2 - PPSV23) 11/22/2020    A1C test (Diabetic or Prediabetic)  06/03/2021    Lipid screen  06/03/2021    Annual Wellness Visit (AWV)  07/22/2021    PSA counseling  08/07/2021    Colon cancer screen colonoscopy  01/23/2028    DTaP/Tdap/Td vaccine (2 - Td) 09/03/2030    Flu vaccine  Completed    AAA screen  Completed    Hepatitis C screen  Completed    Hepatitis A vaccine  Aged Out    Hepatitis B vaccine  Aged Out    Hib vaccine  Aged Out    Meningococcal (ACWY) vaccine  Aged Out       Hemoglobin A1C (%)   Date Value   06/03/2020 6.3 (H)   06/04/2019 4.7   07/24/2018 5.9             ( goal A1C is < 7)   Microalb/Crt. Ratio (mcg/mg creat)   Date Value   07/27/2018 CANNOT BE CALCULATED     LDL Cholesterol (mg/dL)   Date Value   06/03/2020 51   06/04/2019 61       (goal LDL is <100)   AST (U/L)   Date Value   06/03/2020 15     ALT (U/L)   Date Value   06/03/2020 11     BUN (mg/dL)   Date Value   07/06/2020 11     BP Readings from Last 3 Encounters:   10/29/20 (!) 101/55   08/13/20 126/88   07/09/20 126/87          (goal 120/80)    All Future Testing planned in [facility]  Lab Frequency Next Occurrence   Transfuse RBC Transfusion    CBC Auto Differential     Ferritin     Iron And TIBC                 Patient Active Problem List:     HTN (hypertension)     mild Cardiomyopathy-ischemic ejection fraction 40-45%      Coronary artery disease involving native coronary artery of native heart with angina pectoris (HCC)     Prostate cancer adenocarcinoma. [place] stage 6 staus post laparoscopic prostatectomy 2009.      History of right inguinal hernia repair     Stage 3 severe COPD by GOLD classification ([facility]     Erectile dysfunction     Hyperlipidemia     CKD (chronic kidney disease)     Prediabetes     History of colon polyps     CAD in native artery     Iron deficiency anemia secondary to inadequate dietary iron intake     Malabsorption

## 2021-01-03 DIAGNOSIS — I10 ESSENTIAL HYPERTENSION: ICD-10-CM

## 2021-01-04 NOTE — TELEPHONE ENCOUNTER
Request for Norvasc . Pt was due for f/u in November, PC to pt LM to contact office to schedule appt. Next Visit Date:  Future Appointments   Date Time Provider Nando Stallings   4/22/2021 12:30 PM SCHEDULE, [facility]   4/29/2021 11:00 AM Dudley Dey MD [facility]       Health Maintenance   Topic Date Due    Shingles Vaccine (1 of 2) 10/12/2004    Pneumococcal 65+ years Vaccine (2 of 2 - PPSV23) 11/22/2020    A1C test (Diabetic or Prediabetic)  06/03/2021    Lipid screen  06/03/2021    Annual Wellness Visit (AWV)  07/22/2021    PSA counseling  08/07/2021    Colon cancer screen colonoscopy  01/23/2028    DTaP/Tdap/Td vaccine (2 - Td) 09/03/2030    Flu vaccine  Completed    AAA screen  Completed    Hepatitis C screen  Completed    Hepatitis A vaccine  Aged Out    Hepatitis B vaccine  Aged Out    Hib vaccine  Aged Out    Meningococcal (ACWY) vaccine  Aged Out       Hemoglobin A1C (%)   Date Value   06/03/2020 6.3 (H)   06/04/2019 4.7   07/24/2018 5.9             ( goal A1C is < 7)   Microalb/Crt. Ratio (mcg/mg creat)   Date Value   07/27/2018 CANNOT BE CALCULATED     LDL Cholesterol (mg/dL)   Date Value   06/03/2020 51       (goal LDL is <100)   AST (U/L)   Date Value   06/03/2020 15     ALT (U/L)   Date Value   06/03/2020 11     BUN (mg/dL)   Date Value   07/06/2020 11     BP Readings from Last 3 Encounters:   10/29/20 (!) 101/55   08/13/20 126/88   07/09/20 126/87          (goal 120/80)    All Future Testing planned in [facility]  Lab Frequency Next Occurrence   Transfuse RBC Transfusion    CBC Auto Differential     Ferritin     Iron And TIBC           Patient Active Problem List:     HTN (hypertension)     mild Cardiomyopathy-ischemic ejection fraction 40-45%      Coronary artery disease involving native coronary artery of native heart with angina pectoris (HCC)     Prostate cancer adenocarcinoma.  [place] stage 6 staus post laparoscopic prostatectomy 2009.     History of right  inguinal hernia repair     Stage 3 severe COPD by GOLD classification ([address]     Erectile dysfunction     Hyperlipidemia     CKD (chronic kidney disease)     Prediabetes     History of colon polyps     CAD in native artery     Iron deficiency anemia secondary to inadequate dietary iron intake     Malabsorption

## 2021-01-05 RX ORDER — AMLODIPINE BESYLATE 2.5 MG/1
5 TABLET ORAL DAILY
Qty: 30 TABLET | Refills: 0 | Status: SHIPPED | OUTPATIENT
Start: 2021-01-05 | End: 2021-01-20

## 2021-01-08 ENCOUNTER — TELEPHONE (OUTPATIENT)
Dept: INTERNAL MEDICINE | Age: 66
End: 2021-01-08

## 2021-01-28 NOTE — TELEPHONE ENCOUNTER
Pt requesting medication refill, atorvastatin  Next Visit Date:3/2/21  Future Appointments   Date Time Provider Nando Chani   3/2/2021 10:00 AM Mercedes Mitchell MD [facility]   4/22/2021 12:30 PM SCHEDULE, [facility]   4/29/2021 11:00 AM Gogo Okeefe MD [facility]       Health Maintenance   Topic Date Due    Shingles Vaccine (1 of 2) 10/12/2004    Pneumococcal 65+ years Vaccine (2 of 2 - PPSV23) 11/22/2020    A1C test (Diabetic or Prediabetic)  06/03/2021    Lipid screen  06/03/2021    Annual Wellness Visit (AWV)  07/22/2021    PSA counseling  08/07/2021    Colon cancer screen colonoscopy  01/23/2028    DTaP/Tdap/Td vaccine (2 - Td) 09/03/2030    Flu vaccine  Completed    AAA screen  Completed    Hepatitis C screen  Completed    Hepatitis A vaccine  Aged Out    Hepatitis B vaccine  Aged Out    Hib vaccine  Aged Out    Meningococcal (ACWY) vaccine  Aged Out       Hemoglobin A1C (%)   Date Value   06/03/2020 6.3 (H)   06/04/2019 4.7   07/24/2018 5.9             ( goal A1C is < 7)   Microalb/Crt. Ratio (mcg/mg creat)   Date Value   07/27/2018 CANNOT BE CALCULATED     LDL Cholesterol (mg/dL)   Date Value   06/03/2020 51   06/04/2019 61       (goal LDL is <100)   AST (U/L)   Date Value   06/03/2020 15     ALT (U/L)   Date Value   06/03/2020 11     BUN (mg/dL)   Date Value   07/06/2020 11     BP Readings from Last 3 Encounters:   10/29/20 (!) 101/55   08/13/20 126/88   07/09/20 126/87          (goal 120/80)    All Future Testing planned in [facility]  Lab Frequency Next Occurrence   Transfuse RBC Transfusion    CBC Auto Differential     Ferritin     Iron And TIBC                 Patient Active Problem List:     HTN (hypertension)     mild Cardiomyopathy-ischemic ejection fraction 40-45%      Coronary artery disease involving native coronary artery of native heart with angina pectoris (HCC)     Prostate cancer adenocarcinoma.  [place] stage 6 staus post laparoscopic prostatectomy 2009.      History of right  inguinal hernia repair     Stage 3 severe COPD by GOLD classification ([address]     Erectile dysfunction     Hyperlipidemia     CKD (chronic kidney disease)     Prediabetes     History of colon polyps     CAD in native artery     Iron deficiency anemia secondary to inadequate dietary iron intake     Malabsorption

## 2021-02-01 NOTE — TELEPHONE ENCOUNTER
Writer talk with pt today to reclarifity about his medication atorvastatin, pt stated that it was a mistake, pt rejected the refill

## 2021-02-28 DIAGNOSIS — I10 ESSENTIAL HYPERTENSION: ICD-10-CM

## 2021-03-01 NOTE — TELEPHONE ENCOUNTER
E-scribe request for Norvasc . Please review and e-scribe if applicable. Next Visit Date:  Future Appointments   Date Time Provider Nando Stallings   3/2/2021 10:00 AM Gus Carlos MD [facility]   4/22/2021 12:30 PM SCHEDULE, [facility]   4/29/2021 11:00 AM Jasmine Armando MD [facility]     Health Maintenance   Topic Date Due    COVID-19 Vaccine (1 of 2) 10/12/1970    Shingles Vaccine (1 of 2) 10/12/2004    Pneumococcal 65+ years Vaccine (2 of 2 - PPSV23) 11/22/2020    A1C test (Diabetic or Prediabetic)  06/03/2021    Lipid screen  06/03/2021    Annual Wellness Visit (AWV)  07/22/2021    PSA counseling  08/07/2021    Colon cancer screen colonoscopy  01/23/2028    DTaP/Tdap/Td vaccine (2 - Td) 09/03/2030    Flu vaccine  Completed    AAA screen  Completed    Hepatitis C screen  Completed    Hepatitis A vaccine  Aged Out    Hepatitis B vaccine  Aged Out    Hib vaccine  Aged Out    Meningococcal (ACWY) vaccine  Aged Out               (applicable per patient's age: Cancer Screenings, Depression Screening, Fall Risk Screening, Immunizations)    Hemoglobin A1C (%)   Date Value   06/03/2020 6.3 (H)   06/04/2019 4.7   07/24/2018 5.9     Microalb/Crt.  Ratio (mcg/mg creat)   Date Value   07/27/2018 CANNOT BE CALCULATED     LDL Cholesterol (mg/dL)   Date Value   06/03/2020 51     AST (U/L)   Date Value   06/03/2020 15     ALT (U/L)   Date Value   06/03/2020 11     BUN (mg/dL)   Date Value   07/06/2020 11      (goal A1C is < 7)   (goal LDL is <100) need 30-50% reduction from baseline     BP Readings from Last 3 Encounters:   10/29/20 (!) 101/55   08/13/20 126/88   07/09/20 126/87    (goal /80)      All Future Testing planned in [facility]:  Lab Frequency Next Occurrence   Transfuse RBC Transfusion    CBC Auto Differential     Ferritin     Iron And TIBC              Patient Active Problem List:     HTN (hypertension)     mild Cardiomyopathy-ischemic ejection fraction 40-45%      Coronary artery disease involving native coronary artery of native heart with angina pectoris [facility])     Prostate cancer adenocarcinoma. [place] stage 6 staus post laparoscopic prostatectomy 2009.      History of right  inguinal hernia repair     Stage 3 severe COPD by GOLD classification ([address]     Erectile dysfunction     Hyperlipidemia     CKD (chronic kidney disease)     Prediabetes     History of colon polyps     CAD in native artery     Iron deficiency anemia secondary to inadequate dietary iron intake     Malabsorption

## 2021-03-02 RX ORDER — AMLODIPINE BESYLATE 5 MG/1
5 TABLET ORAL DAILY
Qty: 90 TABLET | Refills: 0 | Status: SHIPPED | OUTPATIENT
Start: 2021-03-02 | End: 2021-06-01

## 2021-03-03 ENCOUNTER — TELEPHONE (OUTPATIENT)
Dept: INTERNAL MEDICINE | Age: 66
End: 2021-03-03

## 2021-03-03 DIAGNOSIS — J44.9 STAGE 3 SEVERE COPD BY GOLD CLASSIFICATION (HCC): Primary | ICD-10-CM

## 2021-03-03 NOTE — TELEPHONE ENCOUNTER
PA request received for Spiriva HandiHaler 18MCG capsules    PA processed and submitted to pt insurance, waiting for response in regards to medication coverage     Formulary Alternatives:   Anoro Ellipta  Breo Inhaler  Incruse Ellipta

## 2021-03-05 NOTE — TELEPHONE ENCOUNTER
The Prior Authorization Request for Spiriva Handihaler has been denied. Denial information scanned to this encounter. Please review and advise. Denial reason: This drug is not covered on the formulary. We are denying your request because we do not show that you have tried at least 2 covered drugs that can treat your condition. Covered drug(s) is/are:   Anoro Ellipta inhalation aerosol powder breath activated 62.5-25 MCG/Inhalation,   Breo Ellipta inhalation aerosol powder breath activated 100-25 and 200-25 MCG/INH,   Incruse Ellipta inhalation aerosol powder breath activated 62.5 MCG/inh.      Please discontinue denied medication in patients medication list.

## 2021-03-15 DIAGNOSIS — I25.10 CAD S/P PERCUTANEOUS CORONARY ANGIOPLASTY: ICD-10-CM

## 2021-03-15 DIAGNOSIS — Z98.61 CAD S/P PERCUTANEOUS CORONARY ANGIOPLASTY: ICD-10-CM

## 2021-03-15 RX ORDER — ISOSORBIDE MONONITRATE 30 MG/1
30 TABLET, EXTENDED RELEASE ORAL DAILY
Qty: 90 TABLET | Refills: 3 | Status: SHIPPED | OUTPATIENT
Start: 2021-03-15 | End: 2021-11-23

## 2021-03-15 NOTE — TELEPHONE ENCOUNTER
imdur refill       Pt has upcoming appointment        Next Visit Date:  Future Appointments   Date Time Provider Nando Stallings   3/18/2021 10:45 AM Nata Valles MD [facility]   4/22/2021 12:30 PM SCHEDULE, [facility]   4/29/2021 11:00 AM Claudio Negron MD [facility]       Health Maintenance   Topic Date Due    COVID-19 Vaccine (1) Never done    Shingles Vaccine (1 of 2) Never done    Pneumococcal 65+ years Vaccine (2 of 2 - PPSV23) 11/22/2020    A1C test (Diabetic or Prediabetic)  06/03/2021    Lipid screen  06/03/2021    PSA counseling  08/07/2021    Colon cancer screen colonoscopy  01/23/2028    DTaP/Tdap/Td vaccine (2 - Td) 09/03/2030    Flu vaccine  Completed    AAA screen  Completed    Hepatitis C screen  Completed    Hepatitis A vaccine  Aged Out    Hepatitis B vaccine  Aged Out    Hib vaccine  Aged Out    Meningococcal (ACWY) vaccine  Aged Out       Hemoglobin A1C (%)   Date Value   06/03/2020 6.3 (H)   06/04/2019 4.7   07/24/2018 5.9             ( goal A1C is < 7)   Microalb/Crt. Ratio (mcg/mg creat)   Date Value   07/27/2018 CANNOT BE CALCULATED     LDL Cholesterol (mg/dL)   Date Value   06/03/2020 51   06/04/2019 61       (goal LDL is <100)   AST (U/L)   Date Value   06/03/2020 15     ALT (U/L)   Date Value   06/03/2020 11     BUN (mg/dL)   Date Value   07/06/2020 11     BP Readings from Last 3 Encounters:   10/29/20 (!) 101/55   08/13/20 126/88   07/09/20 126/87          (goal 120/80)    All Future Testing planned in [facility]  Lab Frequency Next Occurrence   Transfuse RBC Transfusion    CBC Auto Differential     Ferritin     Iron And TIBC                 Patient Active Problem List:     HTN (hypertension)     mild Cardiomyopathy-ischemic ejection fraction 40-45%      Coronary artery disease involving native coronary artery of native heart with angina pectoris (HCC)     Prostate cancer adenocarcinoma.  [place] stage 6 staus post laparoscopic prostatectomy 2009.      History of right  inguinal hernia repair     Stage 3 severe COPD by GOLD classification ([address]     Erectile dysfunction     Hyperlipidemia     CKD (chronic kidney disease)     Prediabetes     History of colon polyps     CAD in native artery     Iron deficiency anemia secondary to inadequate dietary iron intake     Malabsorption

## 2021-03-18 ENCOUNTER — OFFICE VISIT (OUTPATIENT)
Dept: INTERNAL MEDICINE | Age: 66
End: 2021-03-18
Payer: MEDICARE

## 2021-03-18 ENCOUNTER — HOSPITAL ENCOUNTER (OUTPATIENT)
Age: 66
Setting detail: SPECIMEN
Discharge: HOME | End: 2021-03-18
Payer: MEDICARE

## 2021-03-18 VITALS
WEIGHT: 151 LBS | HEART RATE: 88 BPM | DIASTOLIC BLOOD PRESSURE: 85 MMHG | BODY MASS INDEX: 20.48 KG/M2 | SYSTOLIC BLOOD PRESSURE: 120 MMHG

## 2021-03-18 DIAGNOSIS — J44.9 STAGE 3 SEVERE COPD BY GOLD CLASSIFICATION (HCC): ICD-10-CM

## 2021-03-18 DIAGNOSIS — C61 PROSTATE CANCER (HCC): ICD-10-CM

## 2021-03-18 DIAGNOSIS — R73.03 PREDIABETES: ICD-10-CM

## 2021-03-18 DIAGNOSIS — I10 ESSENTIAL HYPERTENSION: ICD-10-CM

## 2021-03-18 DIAGNOSIS — I10 ESSENTIAL HYPERTENSION: Primary | ICD-10-CM

## 2021-03-18 DIAGNOSIS — Z98.61 CAD S/P PERCUTANEOUS CORONARY ANGIOPLASTY: ICD-10-CM

## 2021-03-18 DIAGNOSIS — N18.31 STAGE 3A CHRONIC KIDNEY DISEASE (HCC): ICD-10-CM

## 2021-03-18 DIAGNOSIS — R91.1 LUNG NODULE: ICD-10-CM

## 2021-03-18 DIAGNOSIS — I25.10 CAD S/P PERCUTANEOUS CORONARY ANGIOPLASTY: ICD-10-CM

## 2021-03-18 DIAGNOSIS — I70.213 INTERMITTENT CLAUDICATION OF BOTH LOWER EXTREMITIES DUE TO ATHEROSCLEROSIS (HCC): ICD-10-CM

## 2021-03-18 LAB
-: ABNORMAL
AMORPHOUS: ABNORMAL
ANION GAP SERPL CALCULATED.3IONS-SCNC: 13 MMOL/L (ref 9–17)
BACTERIA: ABNORMAL
BILIRUBIN URINE: ABNORMAL
BUN BLDV-MCNC: 12 MG/DL (ref 8–23)
BUN/CREAT BLD: ABNORMAL (ref 9–20)
CALCIUM SERPL-MCNC: 8.9 MG/DL (ref 8.6–10.4)
CASTS UA: ABNORMAL /LPF (ref 0–8)
CHLORIDE BLD-SCNC: 101 MMOL/L (ref 98–107)
CO2: 25 MMOL/L (ref 20–31)
COLOR: ABNORMAL
CREAT SERPL-MCNC: 1.31 MG/DL (ref 0.7–1.2)
CRYSTALS, UA: ABNORMAL /HPF
EPITHELIAL CELLS UA: ABNORMAL /HPF (ref 0–5)
ESTIMATED AVERAGE GLUCOSE: 131 MG/DL
GFR AFRICAN AMERICAN: >60 ML/MIN
GFR NON-AFRICAN AMERICAN: 55 ML/MIN
GFR SERPL CREATININE-BSD FRML MDRD: ABNORMAL ML/MIN/{1.73_M2}
GFR SERPL CREATININE-BSD FRML MDRD: ABNORMAL ML/MIN/{1.73_M2}
GLUCOSE BLD-MCNC: 96 MG/DL (ref 70–99)
GLUCOSE URINE: NEGATIVE
HBA1C MFR BLD: 6.2 % (ref 4–6)
KETONES, URINE: ABNORMAL
LEUKOCYTE ESTERASE, URINE: ABNORMAL
MUCUS: ABNORMAL
NITRITE, URINE: NEGATIVE
OTHER OBSERVATIONS UA: ABNORMAL
PH UA: 5 (ref 5–8)
POTASSIUM SERPL-SCNC: 4.2 MMOL/L (ref 3.7–5.3)
PROTEIN UA: ABNORMAL
RBC UA: ABNORMAL /HPF (ref 0–4)
RENAL EPITHELIAL, UA: ABNORMAL /HPF
SODIUM BLD-SCNC: 139 MMOL/L (ref 135–144)
SPECIFIC GRAVITY UA: 1.03 (ref 1–1.03)
TRICHOMONAS: ABNORMAL
TURBIDITY: CLEAR
URINE HGB: NEGATIVE
UROBILINOGEN, URINE: NORMAL
WBC UA: ABNORMAL /HPF (ref 0–5)
YEAST: ABNORMAL

## 2021-03-18 PROCEDURE — 99211 OFF/OP EST MAY X REQ PHY/QHP: CPT | Performed by: INTERNAL MEDICINE

## 2021-03-18 PROCEDURE — 99214 OFFICE O/P EST MOD 30 MIN: CPT | Performed by: INTERNAL MEDICINE

## 2021-03-18 RX ORDER — DOCUSATE SODIUM 100 MG/1
CAPSULE, LIQUID FILLED ORAL
Qty: 60 CAPSULE | Refills: 5 | Status: SHIPPED | OUTPATIENT
Start: 2021-03-18 | End: 2021-08-30

## 2021-03-18 ASSESSMENT — ENCOUNTER SYMPTOMS
ABDOMINAL PAIN: 0
CONSTIPATION: 1
ABDOMINAL DISTENTION: 0
COUGH: 0
SHORTNESS OF BREATH: 0
PHOTOPHOBIA: 0
WHEEZING: 0
BLOOD IN STOOL: 0

## 2021-03-18 ASSESSMENT — PATIENT HEALTH QUESTIONNAIRE - PHQ9
SUM OF ALL RESPONSES TO PHQ QUESTIONS 1-9: 0
1. LITTLE INTEREST OR PLEASURE IN DOING THINGS: 0
SUM OF ALL RESPONSES TO PHQ QUESTIONS 1-9: 0
SUM OF ALL RESPONSES TO PHQ QUESTIONS 1-9: 0
2. FEELING DOWN, DEPRESSED OR HOPELESS: 0

## 2021-03-18 NOTE — PROGRESS NOTES
[facility]    Progress Note    Date of patient's visit: 3/18/2021    Patient's Name:  Kobi Ferro    YOB: 1955            Patient Care Team:  Marilynn Chen MD as PCP - General (Internal Medicine)  Marilynn Chen MD as PCP - [facility] Empaneled Provider  LINO Griffin - CNP as Nurse Practitioner (Cardiology)  Reji Barrios RN as   Carlen Bumpers, MD as Consulting Physician (Gastroenterology)    REASON FOR VISIT: Routine outpatient follow     Chief Complaint   Patient presents with    Hypertension     pt states that he feels liek spasms in his chest over the last few months, states that it happens like once a week and is like an achy pain, states that it does not hurt really bad but he can tell its there states that it last about a minute or 2 then goes away. He states that he is due to see cardiology.  COPD         HISTORY OF PRESENT ILLNESS:    History was obtained from the patient. Kobi Ferro is a 76 y.o. is here for follow-up. He has some leg pain that is on the right side of his chest and sometimes in the right abdominal area. He said is not exertional.  It actually feels better when he walks. He has such history of constipation and some straining. Denies blood in his stools. He needs a refill on his stool softener. He denies any orthopnea. He says he can walk up to a block or 2 but then has to stop because of leg cramps in his calves bilaterally. He is an ex-smoker who quit in 2018. Prior to that he used to smoke half pack per day for 50 years but at the end of his smoking he was smoking up to a pack per day. He had a chest CT done 2 or 3 years ago which showed very small granulomatous changes. We will repeat her lung CT. He also has a history of prostate cancer. He is advised to follow-up with his oncologist and urologist.  His last PSA was checked in August was less than 0.01.   He has severe COPD but his breathing does not limit his exercise. He had a CT of his abdomen done in 2018 which showed normal aortic caliber. Have not ordered a AAA screen. Patient states he got his Covid vaccine recently. He has history of CAD with stents. He says he is due for a cardiology follow-up. He is continuing on aspirin and Plavix. He says his cardiologist has advised him to remain on dual antiplatelet therapy. He denies blood in his stools. He has a history of AV malformation and GI bleed in the past.  He is following up with hematology. His CBC has been stable for the last year. He has been noted to have mild CKD. Work-up has been negative. We will monitor it closely. Advised to avoid NSAIDs. Renal US 2020  Kidneys:       The right kidney measures 7.2 cm in length and the left kidney measures 9.2   cm in length.       No cortical thinning, contour deforming mass, shadowing stone or   hydronephrosis.         Bladder:       Minimally distended.  No shadowing stone or focal mass.           Impression   No acute findings. Chest CT 2018     FINDINGS:   Mediastinum: Vascular calcifications are noted. [place] is no pathologic   adenopathy.  Small pericardial effusion is noted.  Hiatal hernia is noted. No pleural effusion is noted       Lungs/pleura: Emphysematous changes are noted bilaterally.  Apical scarring   on the right is again noted.  Multiple tiny right middle lobe nodules are   unchanged.  A few tiny right upper lobe nodules are unchanged as well.    Multiple tiny right lower lobe nodular densities are stable.  Left upper lobe   nodular density on image 80 is unchanged.  Multiple left lower lobe tiny   nodular densities are stable.  There is no definite new nodule.       Upper Abdomen: Limited evaluation in the upper abdomen demonstrates no focal   abnormality.       Soft Tissues/Bones: Degenerative changes in the spine are noted.  No   destructive bone lesions are noted.           Impression   Stable exam     Continued follow-up as clinically indicated           Coronary angiogram 2018  Conclusions      Procedure Summary      99% mid RCA stenosis reduced to 0% using 4x28 vision stent and post   dilated using 4 mm NC balloon. Nonobstructive disease of other arteries. BMS used due to large stent (4 mm) and history of gastritis. Recommendations      Dual antiplatelet therapy and risk factor modifications       PFT's 2019  IMPRESSION:  This pulmonary function test is consistent with severe  obstructive ventilatory impairment with significant response to  bronchodilator suggestive of concomitant bronchospasticity. Lung volume  is consistent with airway trapping or hyperinflation. There is severe  reduction in diffusion capacity, which is likely secondary to emphysema. Clinical correlation is recommended. Colonoscopy 2018  Findings:      1. A 3mm sessile polyp was found in the rectosigmoid colon- removed with biopsy forceps  2. Non-bleeding small internal hemorrhoids were found on retroflexion     Recommendations:   1. No e/o active ongoing lower GI bleeding at this time . Monitor H/H: transfuse as needed. 2. Pathology results to be followed      EGD 2018  Recommendations:   1. No evidence of active ongoing GI bleeding at this time . Monitor H/H: transfuse as needed. Maintain Hb% >8gm/dL  2. Pathology results to be followed  3. Avoid NSAIDs     Pathology 2018  SURGICAL PATHOLOGY CONSULTATION     Patient Name: Melodie Pereira [place] Rec: [id number]   Path Number: [id number]   Collected: 1/23/2018   Received: 1/23/2018   Reported: 1/24/2018 13:18     -- Diagnosis --   1.  GASTRIC BODY BIOPSY:  INTESTINAL METAPLASIA WITH ADENOMA. 2.  RECTOSIGMOID BIOPSY:  ADENOMA.      Past Medical History:   Diagnosis Date    Alcohol withdrawal seizure ([facility] [address] 1991    quit ETOH since 1991    Blood loss anemia 2008    transfusion from chronic plavix and asa use    CAD (coronary artery disease) 2006    angioplast with drug eluting stent to l circumflex     Cardiomy[facility] [address]     Chest pain     Chronic kidney disease     CKD (chronic kidney disease) 8/9/2016    COPD (chronic obstructive pulmonary disease) ([facility] [address] 08/2012    severe obstructive disease with bronchospasm on PFT    Cough     \"smoker\"    Erectile dysfunction     History of transfusion of packed red blood cells 2009    s/p prostate surgery    Hyperlipidemia     Hypertension     Nicotine dependence     Primary adenocarcinoma of prostate ([facility] [address] 2009    tucker stage 6 s/p lap prostatectomy    Urinary incontinence     Wears dentures     upper and lower    Wears glasses        Past Surgical History:   Procedure Laterality Date    CARDIAC CATHETERIZATION  2006    drug eluting stent to [facility]  10/2015    STENT WAS PATENT    CARDIAC CATHETERIZATION  10/03/2018    STENT X1    COLONOSCOPY  2008    int hemorrhoids, tiny sigmoid polyp    COLONOSCOPY  09/19/2017    Normal    COLONOSCOPY  1/23/2018    COLONOSCOPY WITH BIOPSY performed by Charlane Meckel[facility] at [facility]    ENDOSCOPY, COLON, DIAGNOSTIC      INGUINAL HERNIA REPAIR Right     [place] COLON CA SCRN NOT  [address] [place] [address] IND N/A 9/19/2017    COLONOSCOPY performed by Ti Tian MD at [address] [place] [address] ESOPHAGOGASTRODUODENOSCOPY TRANSORAL DIAGNOSTIC N/A 9/19/2017    EGD ESOPHAGOGASTRODUODENOSCOPY performed by Ti Tian MD at [address] [place] [address]  2009    adenocarcinoma tucker stage 6    UPPER GASTROINTESTINAL ENDOSCOPY  2008    normal    UPPER GASTROINTESTINAL ENDOSCOPY  09/19/2017    The cardia, fundus, incisura, antrum and pylorus were identified via direct visualization. The endoscopic exam showed 6 small to medium AVMs. Hemostasis was achieved by BiCap cautery. Two hemoclips were placed.       UPPER GASTROINTESTINAL ENDOSCOPY  1/23/2018    EGD BIOPSY performed by Charlane Meckel, MD at [facility]         ALLERGIES      Allergies   Allergen Reactions    Phenytoin Sodium Extended Rash       MEDICATIONS:      Current Outpatient Medications on File Prior to Visit   Medication Sig Dispense Refill    isosorbide mononitrate (IMDUR) 30 MG extended release tablet Take 1 tablet by mouth daily 90 tablet 3    umeclidinium-vilanterol (ANORO ELLIPTA) 62.5-25 MCG/INH AEPB inhaler Inhale 1 puff into the lungs daily 2 each 5    amLODIPine (NORVASC) 5 MG tablet TAKE 1 TABLET BY MOUTH DAILY 90 tablet 0    clopidogrel (PLAVIX) 75 MG tablet Take 1 tablet by mouth daily 90 tablet 1    metoprolol tartrate (LOPRESSOR) 25 MG tablet Take 1 tablet by mouth 2 times daily 180 tablet 1    atorvastatin (LIPITOR) 40 MG tablet Take 1 tablet by mouth daily 90 tablet 1    budesonide-formoterol (SYMBICORT) 80-4.5 MCG/ACT AERO Inhale 2 puffs into the lungs 2 times daily 2 Inhaler 5     MG capsule TAKE 1 CAPSULE BY MOUTH TWICE DAILY 60 capsule 3    pantoprazole (PROTONIX) 40 MG tablet Take 1 tablet by mouth daily 90 tablet 3    albuterol sulfate HFA (PROAIR HFA) 108 (90 Base) MCG/ACT inhaler Inhale 2 puffs into the lungs every 6 hours as needed for Wheezing 1 Inhaler 3    nitroGLYCERIN (NITROSTAT) 0.3 MG SL tablet DISSOLVE 1 TABLET UNDER THE TONGUE EVERY 5 MINUTES AS NEEDED FOR CHEST PAIN UNTIL RELIEF IS OBTAINED, IF PAIN PERSISTS, CALL 911 100 tablet 0    ranolazine (RANEXA) 500 MG extended release tablet Take 500 mg by mouth 2 times daily      aspirin 81 MG chewable tablet Take 1 tablet by mouth daily 30 tablet 3     No current facility-administered medications on file prior to visit. HISTORY    Reviewed and no change from previous record. Eris Ahmadi  reports that he quit smoking about 2 years ago. His smoking use included cigarettes. He has a 25.00 pack-year smoking history.  He has never used smokeless tobacco.    FAMILY HISTORY:    Reviewed and No change from previous visit    HEALTH MAINTENANCE DUE:    There are no preventive care reminders to display for this patient. REVIEW OF SYSTEMS:    12 point review of symptoms completed and found to be normal except noted in the HPI    Review of Systems   Constitutional: Negative for fatigue and unexpected weight change. Eyes: Negative for photophobia and visual disturbance. Respiratory: Negative for cough, shortness of breath and wheezing. Cardiovascular: Positive for chest pain (right sided non exertional pain). Negative for palpitations and leg swelling. B/l leg claudication   Gastrointestinal: Positive for constipation. Negative for abdominal distention, abdominal pain and blood in stool. Endocrine: Negative for polydipsia and polyuria. Genitourinary: Negative for dysuria and frequency. Neurological: Negative for dizziness, syncope, weakness, numbness and headaches. Hematological: Negative for adenopathy. Does not bruise/bleed easily. Psychiatric/Behavioral: Negative for dysphoric mood. The patient is nervous/anxious. PHYSICAL EXAM:     Vitals:    03/18/21 1051   BP: 120/85   Site: Left Upper Arm   Position: Sitting   Cuff Size: Small Adult   Pulse: 88   Weight: 151 lb (68.5 kg)     Body mass index is 20.48 kg/m². BP Readings from Last 3 Encounters:   03/18/21 120/85   10/29/20 (!) 101/55   08/13/20 126/88        Wt Readings from Last 3 Encounters:   03/18/21 151 lb (68.5 kg)   10/29/20 144 lb 14.4 oz (65.7 kg)   08/13/20 150 lb 12.8 oz (68.4 kg)       Physical Exam  Vitals signs and nursing note reviewed. Constitutional:       Appearance: Normal appearance. HENT:      Head: Normocephalic and atraumatic. Eyes:      General: No scleral icterus. Extraocular Movements: Extraocular movements intact. Conjunctiva/sclera: Conjunctivae normal.      Pupils: Pupils are equal, round, and reactive to light. Neck:      Musculoskeletal: Normal range of motion and neck supple. Cardiovascular:      Rate and Rhythm: Normal rate and regular rhythm.    Pulmonary:      Effort: Pulmonary effort is normal.      Breath sounds: Normal breath sounds. No wheezing. Abdominal:      General: Bowel sounds are normal. There is no distension. Palpations: Abdomen is soft. There is no mass. Tenderness: There is no abdominal tenderness. Musculoskeletal:      Right lower leg: No edema. Left lower leg: No edema. Lymphadenopathy:      Cervical: No cervical adenopathy. Skin:     General: Skin is warm and dry. Neurological:      General: No focal deficit present. Mental Status: He is alert and oriented to person, place, and time. LABORATORY FINDINGS:    CBC:  Lab Results   Component Value Date    WBC 7.5 10/22/2020    HGB 14.7 10/22/2020     10/22/2020     BMP:    Lab Results   Component Value Date     07/06/2020    K 4.0 07/06/2020     07/06/2020    CO2 24 07/06/2020    BUN 11 07/06/2020    CREATININE 1.33 07/06/2020    GLUCOSE 96 07/06/2020     HEMOGLOBIN A1C:   Lab Results   Component Value Date    LABA1C 6.3 06/03/2020     MICROALBUMIN URINE:   Lab Results   Component Value Date    MICROALBUR <12 07/27/2018     FASTING LIPID PANEL:  Lab Results   Component Value Date    CHOL 104 06/03/2020    HDL 36 (L) 06/03/2020    TRIG 85 06/03/2020     Lab Results   Component Value Date    LDLCHOLESTEROL 51 06/03/2020       LIVER PROFILE:  Lab Results   Component Value Date    ALT 11 06/03/2020    AST 15 06/03/2020    PROT 7.1 06/03/2020    BILITOT 0.40 06/03/2020    BILIDIR <0.08 07/26/2016    LABALBU 4.2 06/03/2020      THYROID FUNCTION:   Lab Results   Component Value Date    TSH 1.53 10/25/2018      URINEANALYSIS: No results found for: LABURIN  ASSESSMENT AND PLAN:    1. Essential hypertension  Controlled    - Basic Metabolic Panel; Future    2. CAD S/P percutaneous coronary angioplasty  On DAPT  Statins  BB    3. Stage 3 severe COPD by GOLD classification ([address]  Same inhalers    - CT CHEST WO CONTRAST; Future    4. Prediabetes    - Hemoglobin A1C; Future    5. Stage 3a chronic kidney disease  Workup negative  Monitor  Avoid NSAID's    - Urinalysis with Microscopic; Future    6. Prostate cancer adenocarcinoma. [place] stage 6 staus post laparoscopic prostatectomy 2009. Follow up with urology  PSA yearly    7. Lung nodule    - CT CHEST WO CONTRAST; Future    8. Intermittent claudication of both lower extremities due to atherosclerosis (HCC)      - VL LOWER EXTREMITY ARTERIAL SEGMENTAL PRESSURES W PPG; Future          FOLLOW UP AND INSTRUCTIONS:   Return in about 4 months (around 7/18/2021). 1. Alfred Rogers received counseling on the following healthy behaviors: nutrition, exercise and medication adherence    2. Reviewed prior labs and health maintenance. 3. Discussed use, benefit, and side effects of prescribed medications. Barriers to medication compliance addressed. All patient questions answered. Pt voiced understanding.      4. Patient given educational materials - see patient instructions    Steffen Mendes  Attending Physician, [address] [place] [address], Internal Medicine Residency Program  [address] [place] [address]  3/18/2021, 11:12 AM

## 2021-03-18 NOTE — PATIENT INSTRUCTIONS
-Pt due for 3 month f/u in June-- pt to call in May to set up an appt--reminder in [facility] to contact patient as well--AVS given to patient    -Bloodwork orders given to patient, they will have them done before their next visit.     -Your doctor has ordered a CT CHest for you, please contact our scheduling department at [phone] to set up an appointment to have this completed before your next visit.     -Venkatesh Pineda

## 2021-03-28 DIAGNOSIS — D50.8 IRON DEFICIENCY ANEMIA SECONDARY TO INADEQUATE DIETARY IRON INTAKE: ICD-10-CM

## 2021-03-28 DIAGNOSIS — K90.89 OTHER SPECIFIED INTESTINAL MALABSORPTION: ICD-10-CM

## 2021-03-29 NOTE — TELEPHONE ENCOUNTER
RECEIVED INTERFACE REQUEST PER TERE FOR REFILL PROTONIX. PER MD PLANNING TO CONTINUE.     MD FOLLOW UP 04/29/21    PEND TO MD

## 2021-03-31 RX ORDER — PANTOPRAZOLE SODIUM 40 MG/1
40 TABLET, DELAYED RELEASE ORAL DAILY
Qty: 90 TABLET | Refills: 3 | Status: SHIPPED | OUTPATIENT
Start: 2021-03-31 | End: 2022-02-15 | Stop reason: SDUPTHER

## 2021-04-05 ENCOUNTER — OFFICE VISIT (OUTPATIENT)
Dept: UROLOGY | Age: 66
End: 2021-04-05
Payer: MEDICARE

## 2021-04-05 VITALS
DIASTOLIC BLOOD PRESSURE: 82 MMHG | BODY MASS INDEX: 21.54 KG/M2 | HEART RATE: 87 BPM | WEIGHT: 159 LBS | TEMPERATURE: 97.3 F | HEIGHT: 72 IN | SYSTOLIC BLOOD PRESSURE: 118 MMHG

## 2021-04-05 DIAGNOSIS — Z85.46 HISTORY OF PROSTATE CANCER: Primary | ICD-10-CM

## 2021-04-05 DIAGNOSIS — N18.30 STAGE 3 CHRONIC KIDNEY DISEASE, UNSPECIFIED WHETHER STAGE 3A OR 3B CKD (HCC): ICD-10-CM

## 2021-04-05 DIAGNOSIS — N52.31 ERECTILE DYSFUNCTION AFTER RADICAL PROSTATECTOMY: ICD-10-CM

## 2021-04-05 LAB
BILIRUBIN, POC: NEGATIVE
BLOOD URINE, POC: NEGATIVE
CLARITY, POC: NORMAL
COLOR, POC: NORMAL
GLUCOSE URINE, POC: NEGATIVE
KETONES, POC: NEGATIVE
LEUKOCYTE EST, POC: NEGATIVE
NITRITE, POC: NEGATIVE
PH, POC: 6
PROTEIN, POC: NEGATIVE
SPECIFIC GRAVITY, POC: 1.02
UROBILINOGEN, POC: 0.2

## 2021-04-05 PROCEDURE — 81003 URINALYSIS AUTO W/O SCOPE: CPT | Performed by: UROLOGY

## 2021-04-05 PROCEDURE — 99214 OFFICE O/P EST MOD 30 MIN: CPT | Performed by: UROLOGY

## 2021-04-05 PROCEDURE — 99202 OFFICE O/P NEW SF 15 MIN: CPT

## 2021-04-05 RX ORDER — TADALAFIL 20 MG/1
20 TABLET ORAL DAILY PRN
Qty: 30 TABLET | Refills: 3 | Status: SHIPPED | OUTPATIENT
Start: 2021-04-05 | End: 2021-11-23 | Stop reason: ALTCHOICE

## 2021-04-05 NOTE — PROGRESS NOTES
Dr. Umberto Orellana MD  [facility])  Urology Clinic  New Patient Visit      Patient:  Ranjan Jimenez  YOB: 1955  Date: 4/5/2021    HISTORY OF PRESENT ILLNESS:   The patient is a 76 y.o. male who presents today for evaluation of the following problem(s): History of prostate cancer treated with Robot Assisted Laparoscopic Prostatectomy with Bilateral Pelvic Lymph Node Dissection ~10 years ago. PSA in 2020 was 0. He does have very mild EVON that is not bothersome, and also ED with semi-erections but not hard enough for penetration. Overall the problem(s) : show no change. Associated Symptoms: No dysuria, gross hematuria. Pain Severity: 0/10    Summary of old records:   RALP around 2010. PSA 0    Imaging/Labs reviewed during today's visit:  I have independently reviewed and verified the following films during today's visit. PSA    Last several PSA's:  Lab Results   Component Value Date    PSA <0.01 08/07/2020    PSA <0.01 10/25/2018    PSA <0.01 12/07/2015       Last total testosterone:  No results found for: TESTOSTERONE    Urinalysis today:  No results found for this visit on 04/05/21.       Last BUN and creatinine:  Lab Results   Component Value Date    BUN 12 03/18/2021     Lab Results   Component Value Date    CREATININE 1.31 (H) 03/18/2021       Imaging Reviewed during this Office Visit:   (results were independently reviewed by physician and radiology report verified)    PAST MEDICAL, FAMILY AND SOCIAL HISTORY:  Past Medical History:   Diagnosis Date    Alcohol withdrawal seizure ([address] 1991    quit ETOH since 1991    Blood loss anemia 2008    transfusion from chronic plavix and asa use    CAD (coronary artery disease) 2006    angioplast with drug eluting stent to l circumflex     Cardiomyopathy ([address]     Chest pain     Chronic kidney disease     CKD (chronic kidney disease) 8/9/2016    COPD (chronic obstructive pulmonary disease) ([address] 08/2012    severe obstructive disease with bronchospasm on PFT    Cough     \"smoker\"    Erectile dysfunction     History of transfusion of packed red blood cells 2009    s/p prostate surgery    Hyperlipidemia     Hypertension     Nicotine dependence     Primary adenocarcinoma of prostate ([address] 2009    tucker stage 6 s/p lap prostatectomy    Urinary incontinence     Wears dentures     upper and lower    Wears glasses      Past Surgical History:   Procedure Laterality Date    CARDIAC CATHETERIZATION  2006    drug eluting stent to [address] [facility] [place]  10/2015    STENT WAS PATENT    CARDIAC CATHETERIZATION  10/03/2018    STENT X1    COLONOSCOPY  2008    int hemorrhoids, tiny sigmoid polyp    COLONOSCOPY  09/19/2017    Normal    COLONOSCOPY  1/23/2018    COLONOSCOPY WITH BIOPSY performed by Messi Santos MD at [facility]    ENDOSCOPY, COLON, DIAGNOSTIC      INGUINAL HERNIA REPAIR Right     [place] COLON CA SCRN NOT  [address] IND N/A 9/19/2017    COLONOSCOPY performed by Merritt Mcclelland MD at [address] ESOPHAGOGASTRODUODENOSCOPY TRANSORAL DIAGNOSTIC N/A 9/19/2017    EGD ESOPHAGOGASTRODUODENOSCOPY performed by Merritt Mcclelland MD at [facility]  2009    adenocarcinoma tucker stage 6    UPPER GASTROINTESTINAL ENDOSCOPY  2008    normal    UPPER GASTROINTESTINAL ENDOSCOPY  09/19/2017    The cardia, fundus, incisura, antrum and pylorus were identified via direct visualization. The endoscopic exam showed 6 small to medium AVMs. Hemostasis was achieved by BiCap cautery. Two hemoclips were placed.       UPPER GASTROINTESTINAL ENDOSCOPY  1/23/2018    EGD BIOPSY performed by Messi Santos MD at [facility]     Family History   Problem Relation Age of Onset    High Blood Pressure Mother     Heart Disease Mother         CHF    Substance Abuse Father         etoh cirrhosis     Outpatient Medications Marked as Taking for the 4/5/21 encounter (Office Visit) with Wild Verdin MD   Medication Sig Dispense Refill  pantoprazole (PROTONIX) 40 MG tablet TAKE 1 TABLET BY MOUTH DAILY 90 tablet 3    docusate sodium (DOK) 100 MG capsule TAKE 1 CAPSULE BY MOUTH TWICE DAILY 60 capsule 5    isosorbide mononitrate (IMDUR) 30 MG extended release tablet Take 1 tablet by mouth daily 90 tablet 3    umeclidinium-vilanterol (ANORO ELLIPTA) 62.5-25 MCG/INH AEPB inhaler Inhale 1 puff into the lungs daily 2 each 5    amLODIPine (NORVASC) 5 MG tablet TAKE 1 TABLET BY MOUTH DAILY 90 tablet 0    clopidogrel (PLAVIX) 75 MG tablet Take 1 tablet by mouth daily 90 tablet 1    metoprolol tartrate (LOPRESSOR) 25 MG tablet Take 1 tablet by mouth 2 times daily 180 tablet 1    budesonide-formoterol (SYMBICORT) 80-4.5 MCG/ACT AERO Inhale 2 puffs into the lungs 2 times daily 2 Inhaler 5    albuterol sulfate HFA (PROAIR HFA) 108 (90 Base) MCG/ACT inhaler Inhale 2 puffs into the lungs every 6 hours as needed for Wheezing 1 Inhaler 3    nitroGLYCERIN (NITROSTAT) 0.3 MG SL tablet DISSOLVE 1 TABLET UNDER THE TONGUE EVERY 5 MINUTES AS NEEDED FOR CHEST PAIN UNTIL RELIEF IS OBTAINED, IF PAIN PERSISTS, CALL 911 100 tablet 0    ranolazine (RANEXA) 500 MG extended release tablet Take 500 mg by mouth 2 times daily      aspirin 81 MG chewable tablet Take 1 tablet by mouth daily 30 tablet 3       Phenytoin sodium extended  Social History     Tobacco Use   Smoking Status Former Smoker    Packs/day: 0.50    Years: 50.00    Pack years: 25.00    Types: Cigarettes    Quit date: 10/5/2018    Years since quittin.5   Smokeless Tobacco Never Used       Social History     Substance and Sexual Activity   Alcohol Use No    Alcohol/week: 0.0 standard drinks    Comment: Recovered alcoholic, quit in [address]:  Constitutional: negative  Eyes: negative  Respiratory: negative  Cardiovascular: negative  Gastrointestinal: negative  Musculoskeletal: negative  Genitourinary: negative except for what is in HPI  Skin: negative   Neurological: negative  Hematological/Lymphatic: negative  Psychological: negative    Physical Exam:    This a 76 y.o. male   Vitals:    04/05/21 0934   BP: 118/82   Pulse: 87   Temp: 97.3 °F (36.3 °C)     Constitutional: Patient in no acute distress; Neuro: alert and oriented to person place and time. Psych: Mood and affect normal.  Skin: Normal  Lungs: Respiratory effort normal  Cardiovascular:  Normal peripheral pulses  Abdomen: Soft, non-tender, non-distended with no CVA, flank pain, hepatosplenomegaly or hernia. Assessment and Plan      1. History of prostate cancer    2. Stage 3 chronic kidney disease, unspecified whether stage 3a or 3b CKD    3. Erectile dysfunction after radical prostatectomy           Plan:      No follow-ups on file. Cialis for ED PRN  Check PSA in Aug/Sept 2021. Mild stress leakage, not bothersome. He does NOT take nitro at this time for chest pain. We discussed that he cannot take these together. No angina. No function restrictions from his heart.           Dr. Guanaco Zuniga MD

## 2021-04-09 ENCOUNTER — HOSPITAL ENCOUNTER (OUTPATIENT)
Dept: CT IMAGING | Age: 66
Discharge: HOME | End: 2021-04-11
Payer: MEDICARE

## 2021-04-09 ENCOUNTER — HOSPITAL ENCOUNTER (OUTPATIENT)
Dept: VASCULAR LAB | Age: 66
Discharge: HOME | End: 2021-04-09
Payer: MEDICARE

## 2021-04-09 DIAGNOSIS — I70.213 INTERMITTENT CLAUDICATION OF BOTH LOWER EXTREMITIES DUE TO ATHEROSCLEROSIS (HCC): ICD-10-CM

## 2021-04-09 DIAGNOSIS — J44.9 STAGE 3 SEVERE COPD BY GOLD CLASSIFICATION (HCC): ICD-10-CM

## 2021-04-09 DIAGNOSIS — R91.1 LUNG NODULE: ICD-10-CM

## 2021-04-09 PROCEDURE — 71250 CT THORAX DX C-: CPT

## 2021-04-09 PROCEDURE — 93923 UPR/LXTR ART STDY 3+ LVLS: CPT

## 2021-04-22 ENCOUNTER — HOSPITAL ENCOUNTER (OUTPATIENT)
Facility: MEDICAL CENTER | Age: 66
Discharge: HOME | End: 2021-04-22
Payer: MEDICARE

## 2021-04-22 ENCOUNTER — HOSPITAL ENCOUNTER (OUTPATIENT)
Facility: MEDICAL CENTER | Age: 66
End: 2021-04-22
Payer: MEDICARE

## 2021-04-22 DIAGNOSIS — D50.0 IRON DEFICIENCY ANEMIA DUE TO CHRONIC BLOOD LOSS: ICD-10-CM

## 2021-04-22 DIAGNOSIS — K90.89 OTHER SPECIFIED INTESTINAL MALABSORPTION: ICD-10-CM

## 2021-04-22 DIAGNOSIS — D50.8 IRON DEFICIENCY ANEMIA SECONDARY TO INADEQUATE DIETARY IRON INTAKE: ICD-10-CM

## 2021-04-22 LAB
ABSOLUTE EOS #: 0.33 K/UL (ref 0–0.44)
ABSOLUTE IMMATURE GRANULOCYTE: 0.03 K/UL (ref 0–0.3)
ABSOLUTE LYMPH #: 1.35 K/UL (ref 1.1–3.7)
ABSOLUTE MONO #: 0.61 K/UL (ref 0.1–1.2)
BASOPHILS # BLD: 1 % (ref 0–2)
BASOPHILS ABSOLUTE: 0.04 K/UL (ref 0–0.2)
DIFFERENTIAL TYPE: ABNORMAL
EOSINOPHILS RELATIVE PERCENT: 6 % (ref 1–4)
FERRITIN: 47 UG/L (ref 30–400)
HCT VFR BLD CALC: 43 % (ref 40.7–50.3)
HEMOGLOBIN: 13.4 G/DL (ref 13–17)
IMMATURE GRANULOCYTES: 1 %
IRON SATURATION: 30 % (ref 20–55)
IRON: 83 UG/DL (ref 59–158)
LYMPHOCYTES # BLD: 24 % (ref 24–43)
MCH RBC QN AUTO: 28.4 PG (ref 25.2–33.5)
MCHC RBC AUTO-ENTMCNC: 31.2 G/DL (ref 28.4–34.8)
MCV RBC AUTO: 91.1 FL (ref 82.6–102.9)
MONOCYTES # BLD: 11 % (ref 3–12)
NRBC AUTOMATED: 0 PER 100 WBC
PDW BLD-RTO: 13.4 % (ref 11.8–14.4)
PLATELET # BLD: 291 K/UL (ref 138–453)
PLATELET ESTIMATE: ABNORMAL
PMV BLD AUTO: 8.4 FL (ref 8.1–13.5)
RBC # BLD: 4.72 M/UL (ref 4.21–5.77)
RBC # BLD: ABNORMAL 10*6/UL
SEG NEUTROPHILS: 57 % (ref 36–65)
SEGMENTED NEUTROPHILS ABSOLUTE COUNT: 3.18 K/UL (ref 1.5–8.1)
TOTAL IRON BINDING CAPACITY: 274 UG/DL (ref 250–450)
UNSATURATED IRON BINDING CAPACITY: 191 UG/DL (ref 112–347)
WBC # BLD: 5.5 K/UL (ref 3.5–11.3)
WBC # BLD: ABNORMAL 10*3/UL

## 2021-04-22 PROCEDURE — 83540 ASSAY OF IRON: CPT

## 2021-04-22 PROCEDURE — 83550 IRON BINDING TEST: CPT

## 2021-04-22 PROCEDURE — 36415 COLL VENOUS BLD VENIPUNCTURE: CPT

## 2021-04-22 PROCEDURE — 82728 ASSAY OF FERRITIN: CPT

## 2021-04-22 PROCEDURE — 85025 COMPLETE CBC W/AUTO DIFF WBC: CPT

## 2021-04-29 ENCOUNTER — TELEPHONE (OUTPATIENT)
Dept: ONCOLOGY | Age: 66
End: 2021-04-29

## 2021-04-29 ENCOUNTER — OFFICE VISIT (OUTPATIENT)
Dept: ONCOLOGY | Age: 66
End: 2021-04-29
Payer: MEDICARE

## 2021-04-29 VITALS
SYSTOLIC BLOOD PRESSURE: 109 MMHG | TEMPERATURE: 98.8 F | DIASTOLIC BLOOD PRESSURE: 61 MMHG | BODY MASS INDEX: 21.82 KG/M2 | HEART RATE: 78 BPM | WEIGHT: 160.9 LBS

## 2021-04-29 DIAGNOSIS — D50.0 IRON DEFICIENCY ANEMIA DUE TO CHRONIC BLOOD LOSS: ICD-10-CM

## 2021-04-29 DIAGNOSIS — D50.8 IRON DEFICIENCY ANEMIA SECONDARY TO INADEQUATE DIETARY IRON INTAKE: Primary | ICD-10-CM

## 2021-04-29 PROCEDURE — 99211 OFF/OP EST MAY X REQ PHY/QHP: CPT | Performed by: INTERNAL MEDICINE

## 2021-04-29 PROCEDURE — 99214 OFFICE O/P EST MOD 30 MIN: CPT | Performed by: INTERNAL MEDICINE

## 2021-04-29 NOTE — TELEPHONE ENCOUNTER
Belinda Griffin MD VISIT  DR Genevieve Farmer IN TO SEE PATIENT  ORDERS RECEIVED  RV 6 MONTHS W/LABS BEFORE   LABS CDP FE TIBC FERRITIN 10/26/21, ORDERS GIVEN TO PATIENT  MD VISIT 11/2/21 @11AM  AVS PRINTED AND GIVEN TO PATIENT WITH INSTRUCTIONS  PATIENT DISCHARGED AMBULATORY

## 2021-05-06 NOTE — PROGRESS NOTES
_        Chief Complaint   Patient presents with    Follow-up    Discuss Labs        DIAGNOSIS:        Iron deficiency anemia secondary to chronic GI blood loss  Status post GI bleeding  Gastric AV malformation     CURRENT THERAPY:         Status post blood transfusion in January 2018  Status post IV iron infusion in January 2018. April 2019. BRIEF CASE HISTORY:      Mr. Isaac Ferguson is a very pleasant 76 y.o. male with history of multiple comorbidities as listed. The patient was hospitalized in January 2018 because of extensive weakness and fatigue when he was found to have severe anemia. He had blood transfusion and he had GI workup which showed negative colonoscopy however he had gastric AV malformation which was cauterized. Patient received IV iron during hospitalization and he is maintained on oral iron at the present time. He feels much better. He has mild weakness. No dizziness. Patient has dark stool secondary to oral iron but no hematochezia. No melena or hematemesis. No palpitation. No other complaints. He had GI blood loss in January 2018. He had blood transfusion on iron infusion. INTERIM HISTORY:   The patient was seen for follow-up anemia. He is maintained on oral iron. Tolerated well. No GI bleeding. No melena or hematochezia. No hematemesis. No abdominal pain. He is maintained on aspirin. No clear evidence of active bleeding.      PAST MEDICAL HISTORY: has a past medical history of Alcohol withdrawal seizure ([address] Blood loss anemia, CAD (coronary artery disease), Cardiomyopathy ([address] Chest pain, Chronic kidney disease, CKD (chronic kidney disease), COPD (chronic obstructive pulmonary disease) ([address] Cough, Erectile dysfunction, History of transfusion of packed red blood cells, Hyperlipidemia, Hypertension, Nicotine dependence, Primary adenocarcinoma of prostate ([address] Urinary incontinence, Wears dentures, and Wears glasses. PAST SURGICAL HISTORY: has a past surgical history that includes Cardiac catheterization (2006); Endoscopy, colon, diagnostic; Upper gastrointestinal endoscopy (2008); Prostatectomy (2009); Cardiac catheterization (10/2015); Colonoscopy (2008); Inguinal hernia repair (Right); pr colon ca scrn not hi rsk ind (N/A, 9/19/2017); pr esophagogastroduodenoscopy transoral diagnostic (N/A, 9/19/2017); Upper gastrointestinal endoscopy (09/19/2017); Colonoscopy (09/19/2017); Colonoscopy (1/23/2018); Upper gastrointestinal endoscopy (1/23/2018); and Cardiac catheterization (10/03/2018). CURRENT MEDICATIONS:  has a current medication list which includes the following prescription(s): tadalafil, pantoprazole, docusate sodium, isosorbide mononitrate, umeclidinium-vilanterol, amlodipine, clopidogrel, metoprolol tartrate, budesonide-formoterol, albuterol sulfate hfa, ranolazine, aspirin, and nitroglycerin. ALLERGIES:  is allergic to phenytoin sodium extended. FAMILY HISTORY: Negative for any hematological or oncological conditions. SOCIAL HISTORY:  reports that he quit smoking about 2 years ago. His smoking use included cigarettes. He has a 25.00 pack-year smoking history. He has never used smokeless tobacco. He reports previous drug use. Frequency: 1.00 time per week. Drug: Marijuana. He reports that he does not drink alcohol. REVIEW OF SYSTEMS:     · General: No weakness or fatigue. No unanticipated weight loss or decreased appetite. No fever or chills. · Eyes: No blurred vision, eye pain or double vision. · Ears: No hearing problems or drainage. No tinnitus. · Throat: No sore throat, problems with swallowing or dysphagia. · Respiratory: No cough, sputum or hemoptysis. No shortness of breath. No pleuritic chest pain. · Cardiovascular: No chest pain, orthopnea or PND. No lower extremity edema. No palpitation. · Gastrointestinal: As above. · Genitourinary: No dysuria, hematuria, frequency or urgency. · Musculoskeletal: No muscle aches or pains. No limitation of movement. No back pain. No gait disturbance, No joint complaints. · Dermatologic: No skin rashes or pruritus. No skin lesions or discolorations. · Psychiatric: No depression, anxiety, or stress or signs of schizophrenia. No change in mood or affect. · Hematologic: No history of bleeding tendency. No bruises or ecchymosis. No history of clotting problems. · Infectious disease: No fever, chills or frequent infections. · Endocrine: No problems with opacity. No polydipsia or polyuria. No temperature intolerance. · Neurologic: No headaches or dizziness. No weakness or numbness of the extremities. No changes in balance, coordination,  memory, mentation, behavior. · Allergic/Immunologic: No nasal congestion or hives. No repeated infections. PHYSICAL EXAM:  The patient is not in acute distress. Vital signs: Blood pressure 109/61, pulse 78, temperature 98.8 °F (37.1 °C), temperature source Temporal, weight 160 lb 14.4 oz (73 kg). HEENT:  Eyes are normal. Ears, nose and throat are normal.  Neck: Supple. No lymph node enlargement. No thyroid enlargement. Trachea is centrally located. Chest:  Clear to auscultation. No wheezes or crepitations. Heart: Regular sinus rhythm. Abdomen: Soft, nontender. No hepatosplenomegaly. No masses. Extremities:  With no edema. Lymph Nodes:  No cervical, axillary or inguinal lymph node enlargement. Neurologic:  Conscious and oriented. No focal neurological deficits. Psychosocial: No depression, anxiety or stress. Skin: No rashes, bruises or ecchymoses.       Review of Diagnostic data:   Recent Labs     04/22/21  1106   WBC 5.5   HGB 13.4   HCT 43.0   MCV 91.1        Lab Results   Component Value Date    IRON 83 04/22/2021    TIBC 274 04/22/2021    FERRITIN 47 04/22/2021         IMPRESSION:   Iron deficiency anemia secondary to chronic GI blood loss  Status post GI bleeding  Gastric AV malformation  Status post cardiac cath and coronary stents placement. PLAN: I reviewed the labs as above and discussed with the patient. I explained to the patient the nature of this hematologic problem. Patient had severe iron deficiency anemia related to GI blood loss in January 2018. He had excellent response to IV iron infusion and he had blood transfusion that time. Repeated labs showed stable disease. No clear evidence of bleeding. We will monitor closely. Will continue Protonix. Patient will continue to take aspirin and Plavix. Status post recent cardiac stent placement. He will also take Protonix. Labs will be repeated in 6 months. Sooner if he develops any problems. He will have continued monitoring since it is possible for him to have repeated episodes of bleeding from AV malformation. Patient understands and agrees. Patient's questions were answered to the best of his satisfaction and he verbalized full understanding and agreement.

## 2021-05-24 DIAGNOSIS — I10 ESSENTIAL HYPERTENSION: ICD-10-CM

## 2021-05-25 RX ORDER — AMLODIPINE BESYLATE 2.5 MG/1
TABLET ORAL
Qty: 30 TABLET | Refills: 0 | OUTPATIENT
Start: 2021-05-25

## 2021-05-28 DIAGNOSIS — I10 ESSENTIAL HYPERTENSION: ICD-10-CM

## 2021-05-28 NOTE — TELEPHONE ENCOUNTER
Request for Amlodipine. Last Visit Date: 3/18/2021  Next Visit Date:  Future Appointments   Date Time Provider Nando Stallings   10/4/2021 10:20 AM Azul Aviles MD [address] [facility]   11/2/2021 11:00 AM Moises Chang MD [address] [facility] Maintenance   Topic Date Due    Shingles Vaccine (1 of 2) Never done    Pneumococcal 65+ years Vaccine (2 of 2 - PPSV23) 11/22/2020    Annual Wellness Visit (AWV)  07/22/2021    PSA counseling  08/07/2021    A1C test (Diabetic or Prediabetic)  03/18/2022    Lipid screen  06/03/2025    Colon cancer screen colonoscopy  01/23/2028    DTaP/Tdap/Td vaccine (2 - Td) 09/03/2030    Flu vaccine  Completed    COVID-19 Vaccine  Completed    AAA screen  Completed    Hepatitis C screen  Completed    Hepatitis A vaccine  Aged Out    Hepatitis B vaccine  Aged Out    Hib vaccine  Aged Out    Meningococcal (ACWY) vaccine  Aged Out       Hemoglobin A1C (%)   Date Value   03/18/2021 6.2 (H)   06/03/2020 6.3 (H)   06/04/2019 4.7             ( goal A1C is < 7)   Microalb/Crt. Ratio (mcg/mg creat)   Date Value   07/27/2018 CANNOT BE CALCULATED     LDL Cholesterol (mg/dL)   Date Value   06/03/2020 51       (goal LDL is <100)   AST (U/L)   Date Value   06/03/2020 15     ALT (U/L)   Date Value   06/03/2020 11     BUN (mg/dL)   Date Value   03/18/2021 12     BP Readings from Last 3 Encounters:   04/29/21 109/61   04/05/21 118/82   03/18/21 120/85          (goal 120/80)    All Future Testing planned in [facility]  Lab Frequency Next Occurrence   PSA, Diagnostic Once 10/02/2021   Transfuse RBC Transfusion    CBC Auto Differential     Ferritin     Iron And TIBC           Patient Active Problem List:     HTN (hypertension)     Prostate cancer adenocarcinoma. Bess stage 6 staus post laparoscopic prostatectomy 2009.      History of right  inguinal hernia repair     Stage 3 severe COPD by GOLD classification ([address]     Erectile dysfunction     Hyperlipidemia     CKD (chronic kidney disease)     Prediabetes     History of colon polyps     CAD in native artery     Iron deficiency anemia secondary to inadequate dietary iron intake     Malabsorption

## 2021-06-01 RX ORDER — AMLODIPINE BESYLATE 5 MG/1
5 TABLET ORAL DAILY
Qty: 90 TABLET | Refills: 0 | Status: SHIPPED | OUTPATIENT
Start: 2021-06-01 | End: 2021-08-23

## 2021-06-03 DIAGNOSIS — I25.119 CORONARY ARTERY DISEASE INVOLVING NATIVE CORONARY ARTERY OF NATIVE HEART WITH ANGINA PECTORIS (HCC): ICD-10-CM

## 2021-06-04 RX ORDER — ATORVASTATIN CALCIUM 40 MG/1
40 TABLET, FILM COATED ORAL DAILY
Qty: 90 TABLET | Refills: 1 | Status: SHIPPED | OUTPATIENT
Start: 2021-06-04 | End: 2021-11-23

## 2021-06-04 NOTE — TELEPHONE ENCOUNTER
Request for lipitor. Next Visit Date: Patient is on the waitlist for July. Future Appointments   Date Time Provider Nando Stallings   10/4/2021 10:20 AM Yeny Calderon MD [facility]   11/2/2021 11:00 AM Mariam Ervin MD [id number] [facility] [employer] Maintenance   Topic Date Due    Shingles Vaccine (1 of 2) Never done    Pneumococcal 65+ years Vaccine (2 of 2 - PPSV23) 11/22/2020    Annual Wellness Visit (AWV)  07/22/2021    PSA counseling  08/07/2021    A1C test (Diabetic or Prediabetic)  03/18/2022    Lipid screen  06/03/2025    Colon cancer screen colonoscopy  01/23/2028    DTaP/Tdap/Td vaccine (2 - Td or Tdap) 09/03/2030    Flu vaccine  Completed    COVID-19 Vaccine  Completed    AAA screen  Completed    Hepatitis C screen  Completed    Hepatitis A vaccine  Aged Out    Hepatitis B vaccine  Aged Out    Hib vaccine  Aged Out    Meningococcal (ACWY) vaccine  Aged Out       Hemoglobin A1C (%)   Date Value   03/18/2021 6.2 (H)   06/03/2020 6.3 (H)   06/04/2019 4.7             ( goal A1C is < 7)   Microalb/Crt. Ratio (mcg/mg creat)   Date Value   07/27/2018 CANNOT BE CALCULATED     LDL Cholesterol (mg/dL)   Date Value   06/03/2020 51       (goal LDL is <100)   AST (U/L)   Date Value   06/03/2020 15     ALT (U/L)   Date Value   06/03/2020 11     BUN (mg/dL)   Date Value   03/18/2021 12     BP Readings from Last 3 Encounters:   04/29/21 109/61   04/05/21 118/82   03/18/21 120/85          (goal 120/80)    All Future Testing planned in [facility]  Lab Frequency Next Occurrence   PSA, Diagnostic Once 10/02/2021   Transfuse RBC Transfusion    CBC Auto Differential     Ferritin     Iron And TIBC           Patient Active Problem List:     HTN (hypertension)     Prostate cancer adenocarcinoma. [place] stage 6 staus post laparoscopic prostatectomy 2009.      History of right  inguinal hernia repair     Stage 3 severe COPD by GOLD classification (HCC)     Erectile dysfunction

## 2021-06-26 DIAGNOSIS — I25.5 ISCHEMIC CARDIOMYOPATHY: ICD-10-CM

## 2021-06-26 DIAGNOSIS — I25.10 CAD S/P PERCUTANEOUS CORONARY ANGIOPLASTY: ICD-10-CM

## 2021-06-26 DIAGNOSIS — I10 ESSENTIAL HYPERTENSION: ICD-10-CM

## 2021-06-26 DIAGNOSIS — Z98.61 CAD S/P PERCUTANEOUS CORONARY ANGIOPLASTY: ICD-10-CM

## 2021-06-28 RX ORDER — CLOPIDOGREL BISULFATE 75 MG/1
75 TABLET ORAL DAILY
Qty: 90 TABLET | Refills: 1 | Status: SHIPPED | OUTPATIENT
Start: 2021-06-28 | End: 2021-11-23

## 2021-06-28 NOTE — TELEPHONE ENCOUNTER
Erectile dysfunction     Hyperlipidemia     CKD (chronic kidney disease)     Prediabetes     History of colon polyps     CAD in native artery     Iron deficiency anemia secondary to inadequate dietary iron intake     Malabsorption

## 2021-07-13 ENCOUNTER — OFFICE VISIT (OUTPATIENT)
Dept: INTERNAL MEDICINE | Age: 66
End: 2021-07-13
Payer: MEDICARE

## 2021-07-13 VITALS
DIASTOLIC BLOOD PRESSURE: 77 MMHG | WEIGHT: 157 LBS | BODY MASS INDEX: 21.29 KG/M2 | HEART RATE: 74 BPM | SYSTOLIC BLOOD PRESSURE: 114 MMHG

## 2021-07-13 DIAGNOSIS — I25.10 CAD S/P PERCUTANEOUS CORONARY ANGIOPLASTY: ICD-10-CM

## 2021-07-13 DIAGNOSIS — Z98.61 CAD S/P PERCUTANEOUS CORONARY ANGIOPLASTY: ICD-10-CM

## 2021-07-13 DIAGNOSIS — J44.9 STAGE 3 SEVERE COPD BY GOLD CLASSIFICATION (HCC): ICD-10-CM

## 2021-07-13 DIAGNOSIS — Z23 NEED FOR STREPTOCOCCUS PNEUMONIAE VACCINATION: ICD-10-CM

## 2021-07-13 DIAGNOSIS — I10 ESSENTIAL HYPERTENSION: Primary | ICD-10-CM

## 2021-07-13 DIAGNOSIS — N18.31 STAGE 3A CHRONIC KIDNEY DISEASE (HCC): ICD-10-CM

## 2021-07-13 DIAGNOSIS — R73.03 PREDIABETES: ICD-10-CM

## 2021-07-13 PROCEDURE — 99214 OFFICE O/P EST MOD 30 MIN: CPT | Performed by: INTERNAL MEDICINE

## 2021-07-13 PROCEDURE — 99211 OFF/OP EST MAY X REQ PHY/QHP: CPT | Performed by: INTERNAL MEDICINE

## 2021-07-13 PROCEDURE — 90732 PPSV23 VACC 2 YRS+ SUBQ/IM: CPT | Performed by: INTERNAL MEDICINE

## 2021-07-13 SDOH — ECONOMIC STABILITY: FOOD INSECURITY: WITHIN THE PAST 12 MONTHS, THE FOOD YOU BOUGHT JUST DIDN'T LAST AND YOU DIDN'T HAVE MONEY TO GET MORE.: NEVER TRUE

## 2021-07-13 SDOH — ECONOMIC STABILITY: FOOD INSECURITY: WITHIN THE PAST 12 MONTHS, YOU WORRIED THAT YOUR FOOD WOULD RUN OUT BEFORE YOU GOT MONEY TO BUY MORE.: NEVER TRUE

## 2021-07-13 ASSESSMENT — ENCOUNTER SYMPTOMS
WHEEZING: 0
PHOTOPHOBIA: 0
ABDOMINAL PAIN: 0
SHORTNESS OF BREATH: 1
BACK PAIN: 0
COUGH: 0
CONSTIPATION: 1

## 2021-07-13 ASSESSMENT — SOCIAL DETERMINANTS OF HEALTH (SDOH): HOW HARD IS IT FOR YOU TO PAY FOR THE VERY BASICS LIKE FOOD, HOUSING, MEDICAL CARE, AND HEATING?: NOT VERY HARD

## 2021-07-13 NOTE — PROGRESS NOTES
[facility]    Progress Note    Date of patient's visit: 7/13/2021    Patient's Name:  Lucas Brar    YOB: 1955            Patient Care Team:  Franky Monzon MD as PCP - General (Internal Medicine)  Franky Monzon MD as PCP - [address] [place] [address] [facility]  LINO Reynaga - CNP as Nurse Practitioner (Cardiology)  Shanta Mccollum RN as   Nessa Castro MD as Consulting Physician (Gastroenterology)    REASON FOR VISIT: Routine outpatient follow     Chief Complaint   Patient presents with    Hypertension    Health Maintenance     pneumo t23 today, declined shingfrix,  Lipid pended         HISTORY OF PRESENT ILLNESS:    History was obtained from the patient. Lucas Brar is a 76 y.o. is here for follow-up. He is doing well. He denies any complaints. He had some tests and he is here to follow-up on that. He is denying any more chest pain. Last time he was complaining of right-sided chest wall pain. Recent CT showed a stable lung nodule. Spiriva was stopped for him due to formulary changes. We will start him on trilogy. He was getting Anoro and Symbicort as well. We will try to consolidate all his inhalers. He was also complaining of leg claudication last time. But arterial Dopplers and ABIs are normal.  He has recently seen his urologist.  He has a history of prostate cancer many years ago. He denies any urgency or frequency or nocturia. He follows up with oncology. He had episode of AV malformation and GI bleed. His hemoglobin has been stable and he sees hematology every 6 months. He has bare-metal stents. He has a follow-up with cardiology. He continues on aspirin and Plavix currently without any problems. No other concerns today.       CT chest 2021  Impression   Grossly stable tiny pulmonary nodules dating back to 2018.  These are   primarily seen within the right middle lobe.  Given the over 2 years of   stability, a benign process is favored.  No new or enlarging suspicious   pulmonary nodule is seen.           Arterial dopplers         - Brachial Pressure:Right: 114. Left:113.         - HELIO:Right: 1.13. Left: 1.08.           Conclusions        Summary        Normal PVR at rest of the bilateral lower extremity.    Normal arterial PPG waveforms of the bilateral digits.         Renal us 2020  Kidneys:       The right kidney measures 7.2 cm in length and the left kidney measures 9.2   cm in length.       No cortical thinning, contour deforming mass, shadowing stone or   hydronephrosis.         Bladder:       Minimally distended.  No shadowing stone or focal mass.           Impression   No acute findings.           Echo 2018  CONCLUSIONS     Summary  Left ventricle is normal in size, global left ventricular systolic function  is moderately reduced with global hypokinesis (more in anterolateral wall). Estimated ejection fraction is 35%. Grade I (mild) left ventricular diastolic dysfunction. No significant valvular regurgitation or stenosis seen. Insignificant tricuspid regurgitation, unable to estimate RVSP     Coronary angiogram 2018  Conclusions      Procedure Summary      99% mid RCA stenosis reduced to 0% using 4x28 vision stent and post   dilated using 4 mm NC balloon. Nonobstructive disease of other arteries. BMS used due to large stent (4 mm) and history of gastritis.       Recommendations      Dual antiplatelet therapy and risk factor modifications  Past Medical History:   Diagnosis Date    Alcohol withdrawal seizure ([address] 1991    quit ETOH since 1991    Blood loss anemia 2008    transfusion from chronic plavix and asa use    CAD (coronary artery disease) 2006    angioplast with drug eluting stent to l circumflex     Cardiomyopathy ([address]     Chest pain     Chronic kidney disease     CKD (chronic kidney disease) 8/9/2016    COPD (chronic obstructive pulmonary disease) ([address] 08/2012    severe obstructive disease with bronchospasm on PFT    Cough     \"smoker\"    Erectile dysfunction     History of transfusion of packed red blood cells 2009    s/p prostate surgery    Hyperlipidemia     Hypertension     Nicotine dependence     Primary adenocarcinoma of prostate ([address] 2009    tucker stage 6 s/p lap prostatectomy    Urinary incontinence     Wears dentures     upper and lower    Wears glasses        Past Surgical History:   Procedure Laterality Date    CARDIAC CATHETERIZATION  2006    drug eluting stent to [facility]  10/2015    STENT WAS PATENT    CARDIAC CATHETERIZATION  10/03/2018    STENT X1    COLONOSCOPY  2008    int hemorrhoids, tiny sigmoid polyp    COLONOSCOPY  09/19/2017    Normal    COLONOSCOPY  1/23/2018    COLONOSCOPY WITH BIOPSY performed by Casandra Toledo MD at [facility]    ENDOSCOPY, COLON, DIAGNOSTIC      INGUINAL HERNIA REPAIR Right     ND COLON CA SCRN NOT  [address] [place] [address] IND N/A 9/19/2017    COLONOSCOPY performed by Aylin Decker MD at [address] ESOPHAGOGASTRODUODENOSCOPY TRANSORAL DIAGNOSTIC N/A 9/19/2017    EGD ESOPHAGOGASTRODUODENOSCOPY performed by Aylin Decker MD at [address] [facility]  2009    adenocarcinoma tucker stage 6    UPPER GASTROINTESTINAL ENDOSCOPY  2008    normal    UPPER GASTROINTESTINAL ENDOSCOPY  09/19/2017    The cardia, fundus, incisura, antrum and pylorus were identified via direct visualization. The endoscopic exam showed 6 small to medium AVMs. Hemostasis was achieved by BiCap cautery. Two hemoclips were placed.       UPPER GASTROINTESTINAL ENDOSCOPY  1/23/2018    EGD BIOPSY performed by Casandra Toledo MD at [facility]         ALLERGIES      Allergies   Allergen Reactions    Phenytoin Sodium Extended Rash       MEDICATIONS:      Current Outpatient Medications on File Prior to Visit   Medication Sig Dispense Refill    clopidogrel (PLAVIX) 75 MG tablet TAKE 1 TABLET BY MOUTH DAILY 90 tablet 1    metoprolol tartrate (LOPRESSOR) 25 MG tablet TAKE 1 TABLET BY MOUTH TWICE DAILY 180 tablet 1    atorvastatin (LIPITOR) 40 MG tablet TAKE 1 TABLET BY MOUTH DAILY 90 tablet 1    amLODIPine (NORVASC) 5 MG tablet TAKE 1 TABLET BY MOUTH DAILY 90 tablet 0    tadalafil (CIALIS) 20 MG tablet Take 1 tablet by mouth daily as needed for Erectile Dysfunction 30 tablet 3    pantoprazole (PROTONIX) 40 MG tablet TAKE 1 TABLET BY MOUTH DAILY 90 tablet 3    docusate sodium (DOK) 100 MG capsule TAKE 1 CAPSULE BY MOUTH TWICE DAILY 60 capsule 5    isosorbide mononitrate (IMDUR) 30 MG extended release tablet Take 1 tablet by mouth daily 90 tablet 3    umeclidinium-vilanterol (ANORO ELLIPTA) 62.5-25 MCG/INH AEPB inhaler Inhale 1 puff into the lungs daily 2 each 5    budesonide-formoterol (SYMBICORT) 80-4.5 MCG/ACT AERO Inhale 2 puffs into the lungs 2 times daily 2 Inhaler 5    albuterol sulfate HFA (PROAIR HFA) 108 (90 Base) MCG/ACT inhaler Inhale 2 puffs into the lungs every 6 hours as needed for Wheezing 1 Inhaler 3    nitroGLYCERIN (NITROSTAT) 0.3 MG SL tablet DISSOLVE 1 TABLET UNDER THE TONGUE EVERY 5 MINUTES AS NEEDED FOR CHEST PAIN UNTIL RELIEF IS OBTAINED, IF PAIN PERSISTS, CALL 911 100 tablet 0    ranolazine (RANEXA) 500 MG extended release tablet Take 500 mg by mouth 2 times daily      aspirin 81 MG chewable tablet Take 1 tablet by mouth daily 30 tablet 3     No current facility-administered medications on file prior to visit. HISTORY    Reviewed and no change from previous record. Janett Castro  reports that he quit smoking about 2 years ago. His smoking use included cigarettes. He has a 25.00 pack-year smoking history.  He has never used smokeless tobacco.    FAMILY HISTORY:    Reviewed and No change from previous visit    HEALTH MAINTENANCE DUE:      Health Maintenance Due   Topic Date Due    Shingles Vaccine (1 of 2) Never done    Pneumococcal 65+ years Vaccine (2 of 2 - PPSV23) 11/22/2020    Lipid screen  06/03/2021    Annual Wellness Visit (AWV)  07/22/2021    PSA counseling  08/07/2021       REVIEW OF SYSTEMS:    12 point review of symptoms completed and found to be normal except noted in the HPI    Review of Systems   Constitutional: Negative for fatigue and unexpected weight change. Eyes: Negative for photophobia and visual disturbance. Respiratory: Positive for shortness of breath. Negative for cough and wheezing. Cardiovascular: Negative for chest pain, palpitations and leg swelling. Gastrointestinal: Positive for constipation. Negative for abdominal pain. Endocrine: Negative for polydipsia and polyuria. Genitourinary: Negative for dysuria and frequency. Musculoskeletal: Negative for arthralgias and back pain. Allergic/Immunologic: Positive for environmental allergies. Negative for immunocompromised state. Neurological: Negative for dizziness, weakness and headaches. Hematological: Negative for adenopathy. Does not bruise/bleed easily. PHYSICAL EXAM:     Vitals:    07/13/21 1357   BP: 114/77   Site: Left Upper Arm   Position: Sitting   Cuff Size: Small Adult   Pulse: 74   Weight: 157 lb (71.2 kg)     Body mass index is 21.29 kg/m². BP Readings from Last 3 Encounters:   07/13/21 114/77   04/29/21 109/61   04/05/21 118/82        Wt Readings from Last 3 Encounters:   07/13/21 157 lb (71.2 kg)   04/29/21 160 lb 14.4 oz (73 kg)   04/05/21 159 lb (72.1 kg)       Physical Exam  Vitals and nursing note reviewed. Constitutional:       Appearance: Normal appearance. HENT:      Head: Normocephalic and atraumatic. Eyes:      General: No scleral icterus. Extraocular Movements: Extraocular movements intact. Conjunctiva/sclera: Conjunctivae normal.      Pupils: Pupils are equal, round, and reactive to light. Cardiovascular:      Rate and Rhythm: Normal rate and regular rhythm. Heart sounds: No murmur heard.      Pulmonary:      Effort: Pulmonary effort is normal.      Breath sounds:

## 2021-08-23 DIAGNOSIS — I10 ESSENTIAL HYPERTENSION: ICD-10-CM

## 2021-08-23 RX ORDER — AMLODIPINE BESYLATE 5 MG/1
5 TABLET ORAL DAILY
Qty: 90 TABLET | Refills: 0 | Status: SHIPPED | OUTPATIENT
Start: 2021-08-23 | End: 2021-08-27

## 2021-08-23 NOTE — TELEPHONE ENCOUNTER
Request for norvasc . Pt on wait list for 6 month f/u in January     Next Visit Date:  Future Appointments   Date Time Provider Nando Chani   10/4/2021 10:20 AM Viktor Nelson MD [facility]   11/2/2021 11:00 AM Catalino Jacobo MD [address] [facility] [employer] Maintenance   Topic Date Due    Lipid screen  06/03/2021    Annual Wellness Visit (AWV)  07/22/2021    PSA counseling  08/07/2021    Shingles Vaccine (1 of 2) 07/13/2022 (Originally 10/12/2004)    Flu vaccine (1) 09/01/2021    A1C test (Diabetic or Prediabetic)  03/18/2022    Low dose CT lung screening  04/09/2022    Colon cancer screen colonoscopy  01/23/2028    DTaP/Tdap/Td vaccine (2 - Td or Tdap) 09/03/2030    Pneumococcal 65+ years Vaccine  Completed    COVID-19 Vaccine  Completed    AAA screen  Completed    Hepatitis C screen  Completed    Hepatitis A vaccine  Aged Out    Hepatitis B vaccine  Aged Out    Hib vaccine  Aged Out    Meningococcal (ACWY) vaccine  Aged Out       Hemoglobin A1C (%)   Date Value   03/18/2021 6.2 (H)   06/03/2020 6.3 (H)   06/04/2019 4.7             ( goal A1C is < 7)   Microalb/Crt. Ratio (mcg/mg creat)   Date Value   07/27/2018 CANNOT BE CALCULATED     LDL Cholesterol (mg/dL)   Date Value   06/03/2020 51       (goal LDL is <100)   AST (U/L)   Date Value   06/03/2020 15     ALT (U/L)   Date Value   06/03/2020 11     BUN (mg/dL)   Date Value   03/18/2021 12     BP Readings from Last 3 Encounters:   07/13/21 114/77   04/29/21 109/61   04/05/21 118/82          (goal 120/80)    All Future Testing planned in [facility]  Lab Frequency Next Occurrence   PSA, Diagnostic Once 10/02/2021   Lipid Panel Once 10/21/2021   Transfuse RBC Transfusion    CBC Auto Differential     Ferritin     Iron And TIBC           Patient Active Problem List:     HTN (hypertension)     Prostate cancer adenocarcinoma. [place] stage 6 staus post laparoscopic prostatectomy 2009.      History of right  inguinal hernia repair Stage 3 severe COPD by GOLD classification ([facility] [address]     Erectile dysfunction     Hyperlipidemia     CKD (chronic kidney disease)     Prediabetes     History of colon polyps     CAD in native artery     Iron deficiency anemia secondary to inadequate dietary iron intake     Malabsorption

## 2021-08-26 DIAGNOSIS — I10 ESSENTIAL HYPERTENSION: ICD-10-CM

## 2021-08-27 RX ORDER — AMLODIPINE BESYLATE 5 MG/1
5 TABLET ORAL DAILY
Qty: 90 TABLET | Refills: 0 | Status: SHIPPED | OUTPATIENT
Start: 2021-08-27 | End: 2021-11-23

## 2021-08-27 NOTE — TELEPHONE ENCOUNTER
Request for norvasc .(refilled 8/23 but with no refills)    Pt on wait list for 6 month f/u in January     Next Visit Date:  Future Appointments   Date Time Provider Nando Haylie   10/4/2021 10:20 AM Kan Green MD [facility]   11/2/2021 11:00 AM Reshma Portillo MD [id number] [facility] Maintenance   Topic Date Due    Lipid screen  06/03/2021    Annual Wellness Visit (AWV)  07/22/2021    PSA counseling  08/07/2021    Shingles Vaccine (1 of 2) 07/13/2022 (Originally 10/12/2004)    Flu vaccine (1) 09/01/2021    A1C test (Diabetic or Prediabetic)  03/18/2022    Low dose CT lung screening  04/09/2022    Colon cancer screen colonoscopy  01/23/2028    DTaP/Tdap/Td vaccine (2 - Td or Tdap) 09/03/2030    Pneumococcal 65+ years Vaccine  Completed    COVID-19 Vaccine  Completed    AAA screen  Completed    Hepatitis C screen  Completed    Hepatitis A vaccine  Aged Out    Hepatitis B vaccine  Aged Out    Hib vaccine  Aged Out    Meningococcal (ACWY) vaccine  Aged Out       Hemoglobin A1C (%)   Date Value   03/18/2021 6.2 (H)   06/03/2020 6.3 (H)   06/04/2019 4.7             ( goal A1C is < 7)   Microalb/Crt. Ratio (mcg/mg creat)   Date Value   07/27/2018 CANNOT BE CALCULATED     LDL Cholesterol (mg/dL)   Date Value   06/03/2020 51       (goal LDL is <100)   AST (U/L)   Date Value   06/03/2020 15     ALT (U/L)   Date Value   06/03/2020 11     BUN (mg/dL)   Date Value   03/18/2021 12     BP Readings from Last 3 Encounters:   07/13/21 114/77   04/29/21 109/61   04/05/21 118/82          (goal 120/80)    All Future Testing planned in [facility]  Lab Frequency Next Occurrence   PSA, Diagnostic Once 10/02/2021   Lipid Panel Once 10/21/2021   Transfuse RBC Transfusion    CBC Auto Differential     Ferritin     Iron And TIBC           Patient Active Problem List:     HTN (hypertension)     Prostate cancer adenocarcinoma. Bess stage 6 staus post laparoscopic prostatectomy 2009.      History of right  inguinal hernia repair     Stage 3 severe COPD by GOLD classification ([address]     Erectile dysfunction     Hyperlipidemia     CKD (chronic kidney disease)     Prediabetes     History of colon polyps     CAD in native artery     Iron deficiency anemia secondary to inadequate dietary iron intake     Malabsorption

## 2021-08-30 RX ORDER — DOCUSATE SODIUM 100 MG/1
CAPSULE, LIQUID FILLED ORAL
Qty: 60 CAPSULE | Refills: 5 | Status: SHIPPED | OUTPATIENT
Start: 2021-08-30 | End: 2022-03-11

## 2021-08-30 NOTE — TELEPHONE ENCOUNTER
Request for docusate. Pt on wait list for 6 month f/u in February    Next Visit Date:  Future Appointments   Date Time Provider Nando Chani   10/4/2021 10:20 AM Amish Gardiner MD [facility]   11/2/2021 11:00 AM Keena George MD [id number] [facility] [employer] Maintenance   Topic Date Due    Lipid screen  06/03/2021    Annual Wellness Visit (AWV)  07/22/2021    PSA counseling  08/07/2021    Shingles Vaccine (1 of 2) 07/13/2022 (Originally 10/12/2004)    Flu vaccine (1) 09/01/2021    A1C test (Diabetic or Prediabetic)  03/18/2022    Low dose CT lung screening  04/09/2022    Colon cancer screen colonoscopy  01/23/2028    DTaP/Tdap/Td vaccine (2 - Td or Tdap) 09/03/2030    Pneumococcal 65+ years Vaccine  Completed    COVID-19 Vaccine  Completed    AAA screen  Completed    Hepatitis C screen  Completed    Hepatitis A vaccine  Aged Out    Hepatitis B vaccine  Aged Out    Hib vaccine  Aged Out    Meningococcal (ACWY) vaccine  Aged Out       Hemoglobin A1C (%)   Date Value   03/18/2021 6.2 (H)   06/03/2020 6.3 (H)   06/04/2019 4.7             ( goal A1C is < 7)   Microalb/Crt. Ratio (mcg/mg creat)   Date Value   07/27/2018 CANNOT BE CALCULATED     LDL Cholesterol (mg/dL)   Date Value   06/03/2020 51       (goal LDL is <100)   AST (U/L)   Date Value   06/03/2020 15     ALT (U/L)   Date Value   06/03/2020 11     BUN (mg/dL)   Date Value   03/18/2021 12     BP Readings from Last 3 Encounters:   07/13/21 114/77   04/29/21 109/61   04/05/21 118/82          (goal 120/80)    All Future Testing planned in [facility]  Lab Frequency Next Occurrence   PSA, Diagnostic Once 10/02/2021   Lipid Panel Once 10/21/2021   Transfuse RBC Transfusion    CBC Auto Differential     Ferritin     Iron And TIBC           Patient Active Problem List:     HTN (hypertension)     Prostate cancer adenocarcinoma. Bess stage 6 staus post laparoscopic prostatectomy 2009.      History of right  inguinal hernia repair Stage 3 severe COPD by GOLD classification ([facility] [address]     Erectile dysfunction     Hyperlipidemia     CKD (chronic kidney disease)     Prediabetes     History of colon polyps     CAD in native artery     Iron deficiency anemia secondary to inadequate dietary iron intake     Malabsorption

## 2021-10-07 ENCOUNTER — HOSPITAL ENCOUNTER (OUTPATIENT)
Age: 66
Setting detail: SPECIMEN
Discharge: HOME | End: 2021-10-07
Payer: MEDICARE

## 2021-10-07 DIAGNOSIS — K90.89 OTHER SPECIFIED INTESTINAL MALABSORPTION: ICD-10-CM

## 2021-10-07 DIAGNOSIS — D50.8 IRON DEFICIENCY ANEMIA SECONDARY TO INADEQUATE DIETARY IRON INTAKE: ICD-10-CM

## 2021-10-07 DIAGNOSIS — Z85.46 HISTORY OF PROSTATE CANCER: ICD-10-CM

## 2021-10-07 DIAGNOSIS — D50.0 IRON DEFICIENCY ANEMIA DUE TO CHRONIC BLOOD LOSS: ICD-10-CM

## 2021-10-07 LAB
ABSOLUTE EOS #: 0.4 K/UL (ref 0–0.44)
ABSOLUTE IMMATURE GRANULOCYTE: 0.05 K/UL (ref 0–0.3)
ABSOLUTE LYMPH #: 1.55 K/UL (ref 1.1–3.7)
ABSOLUTE MONO #: 0.6 K/UL (ref 0.1–1.2)
BASOPHILS # BLD: 1 % (ref 0–2)
BASOPHILS ABSOLUTE: 0.04 K/UL (ref 0–0.2)
DIFFERENTIAL TYPE: ABNORMAL
EOSINOPHILS RELATIVE PERCENT: 7 % (ref 1–4)
HCT VFR BLD CALC: 42.6 % (ref 40.7–50.3)
HEMOGLOBIN: 13.1 G/DL (ref 13–17)
IMMATURE GRANULOCYTES: 1 %
LYMPHOCYTES # BLD: 25 % (ref 24–43)
MCH RBC QN AUTO: 28.5 PG (ref 25.2–33.5)
MCHC RBC AUTO-ENTMCNC: 30.8 G/DL (ref 28.4–34.8)
MCV RBC AUTO: 92.8 FL (ref 82.6–102.9)
MONOCYTES # BLD: 10 % (ref 3–12)
NRBC AUTOMATED: 0 PER 100 WBC
PDW BLD-RTO: 13.5 % (ref 11.8–14.4)
PLATELET # BLD: 329 K/UL (ref 138–453)
PLATELET ESTIMATE: ABNORMAL
PMV BLD AUTO: 9.3 FL (ref 8.1–13.5)
PROSTATE SPECIFIC ANTIGEN: <0.02 UG/L
RBC # BLD: 4.59 M/UL (ref 4.21–5.77)
RBC # BLD: ABNORMAL 10*6/UL
SEG NEUTROPHILS: 56 % (ref 36–65)
SEGMENTED NEUTROPHILS ABSOLUTE COUNT: 3.52 K/UL (ref 1.5–8.1)
WBC # BLD: 6.2 K/UL (ref 3.5–11.3)
WBC # BLD: ABNORMAL 10*3/UL

## 2021-10-11 ENCOUNTER — OFFICE VISIT (OUTPATIENT)
Dept: UROLOGY | Age: 66
End: 2021-10-11
Payer: MEDICARE

## 2021-10-11 VITALS
HEIGHT: 72 IN | SYSTOLIC BLOOD PRESSURE: 124 MMHG | BODY MASS INDEX: 21.26 KG/M2 | HEART RATE: 90 BPM | WEIGHT: 157 LBS | DIASTOLIC BLOOD PRESSURE: 86 MMHG

## 2021-10-11 DIAGNOSIS — Z85.46 HISTORY OF PROSTATE CANCER: Primary | ICD-10-CM

## 2021-10-11 DIAGNOSIS — N52.31 ERECTILE DYSFUNCTION AFTER RADICAL PROSTATECTOMY: ICD-10-CM

## 2021-10-11 LAB
APPEARANCE FLUID: NORMAL
BILIRUBIN, POC: NORMAL
BLOOD URINE, POC: NORMAL
CLARITY, POC: CLEAR
COLOR, POC: YELLOW
GLUCOSE URINE, POC: NORMAL
KETONES, POC: NORMAL
LEUKOCYTE EST, POC: NORMAL
NITRITE, POC: NORMAL
PH, POC: 6
PROTEIN, POC: NORMAL
SPECIFIC GRAVITY, POC: >=1.03
UROBILINOGEN, POC: 0.2

## 2021-10-11 PROCEDURE — 99212 OFFICE O/P EST SF 10 MIN: CPT

## 2021-10-11 PROCEDURE — 99214 OFFICE O/P EST MOD 30 MIN: CPT | Performed by: UROLOGY

## 2021-10-11 PROCEDURE — 81002 URINALYSIS NONAUTO W/O SCOPE: CPT | Performed by: UROLOGY

## 2021-10-11 NOTE — PROGRESS NOTES
Dr. Victor M Arora MD  [facility] ([facility])      Patient:  Lucy Franco  YOB: 1955  Date: 10/11/2021    HISTORY OF PRESENT ILLNESS:   The patient is a 76 y.o. male who presents today for evaluation of the following problem(s): History of prostate cancer treated with Robot Assisted Laparoscopic Prostatectomy with Bilateral Pelvic Lymph Node Dissection ~10 years ago. PSA in 2020 was 0. He does have very mild EVON that is not bothersome, and also ED with semi-erections but not hard enough for penetration. He was given Cialis for ED but it did NOT help him much. Overall the problem(s) : show no change. Associated Symptoms: No dysuria, gross hematuria. Pain Severity: 0/10    Summary of old records:   RALP around 2010. PSA 0 in Oct 2021. Imaging/Labs reviewed during today's visit:  I have independently reviewed and verified the following films during today's visit.   PSA    Last several PSA's:  Lab Results   Component Value Date    PSA <0.02 10/07/2021    PSA <0.01 08/07/2020    PSA <0.01 10/25/2018       Last total testosterone:  No results found for: TESTOSTERONE    Urinalysis today:  Results for POC orders placed in visit on 10/11/21   POCT Urine Dipstick   Result Value Ref Range    Color, UA yellow     Clarity, UA clear     Glucose, UA POC neg     Bilirubin, UA neg     Ketones, UA neg     Spec Grav, UA >=1.030     Blood, UA POC neg     pH, UA 6.0     Protein, UA POC neg     Urobilinogen, UA 0.2     Leukocytes, UA neg     Nitrite, UA neg     Appearance, Fluid           Last BUN and creatinine:  Lab Results   Component Value Date    BUN 12 03/18/2021     Lab Results   Component Value Date    CREATININE 1.31 (H) 03/18/2021       Imaging Reviewed during this Office Visit:   (results were independently reviewed by physician and radiology report verified)    PAST MEDICAL, FAMILY AND SOCIAL HISTORY:  Past Medical History:   Diagnosis Date    Alcohol withdrawal seizure ([address] 1991    quit ETOH since 1991  Blood loss anemia 2008    transfusion from chronic plavix and asa use    CAD (coronary artery disease) 2006    angioplast with drug eluting stent to l circumflex     Cardiomyopathy ([facility] [address]     Chest pain     Chronic kidney disease     CKD (chronic kidney disease) 8/9/2016    COPD (chronic obstructive pulmonary disease) ([facility] [address] 08/2012    severe obstructive disease with bronchospasm on PFT    Cough     \"smoker\"    Erectile dysfunction     History of transfusion of packed red blood cells 2009    s/p prostate surgery    Hyperlipidemia     Hypertension     Nicotine dependence     Primary adenocarcinoma of prostate ([facility] [address] 2009    tucker stage 6 s/p lap prostatectomy    Urinary incontinence     Wears dentures     upper and lower    Wears glasses      Past Surgical History:   Procedure Laterality Date    CARDIAC CATHETERIZATION  2006    drug eluting stent to LCX STENT X1    CARDIAC CATHETERIZATION  10/2015    STENT WAS PATENT    CARDIAC CATHETERIZATION  10/03/2018    STENT X1    COLONOSCOPY  2008    int hemorrhoids, tiny sigmoid polyp    COLONOSCOPY  09/19/2017    Normal    COLONOSCOPY  1/23/2018    COLONOSCOPY WITH BIOPSY performed by Gio Alarcon MD at [facility]    ENDOSCOPY, COLON, DIAGNOSTIC      INGUINAL HERNIA REPAIR Right     WI COLON CA SCRN NOT  [address] [place] [address] N/A 9/19/2017    COLONOSCOPY performed by Curtis Alejo MD at [address] ESOPHAGOGASTRODUODENOSCOPY TRANSORAL DIAGNOSTIC N/A 9/19/2017    EGD ESOPHAGOGASTRODUODENOSCOPY performed by Curtis Alejo MD at [address] [place] [address]  2009    adenocarcinoma tucker stage 6    UPPER GASTROINTESTINAL ENDOSCOPY  2008    normal    UPPER GASTROINTESTINAL ENDOSCOPY  09/19/2017    The cardia, fundus, incisura, antrum and pylorus were identified via direct visualization. The endoscopic exam showed 6 small to medium AVMs. Hemostasis was achieved by BiCap cautery. Two hemoclips were placed.       UPPER GASTROINTESTINAL ENDOSCOPY 1/23/2018    EGD BIOPSY performed by Mónica Estes MD at [facility]     Family History   Problem Relation Age of Onset    High Blood Pressure Mother     Heart Disease Mother         CHF    Substance Abuse Father         etoh cirrhosis     Outpatient Medications Marked as Taking for the 10/11/21 encounter (Office Visit) with La Shields MD   Medication Sig Dispense Refill    docusate sodium (STOOL SOFTENER) 100 MG capsule TAKE 1 CAPSULE BY MOUTH TWICE DAILY 60 capsule 5    amLODIPine (NORVASC) 5 MG tablet TAKE 1 TABLET BY MOUTH DAILY 90 tablet 0    fluticasone-umeclidin-vilant (TRELEGY ELLIPTA) 100-62.5-25 MCG/INH AEPB Inhale 1 puff into the lungs daily 1 each 5    clopidogrel (PLAVIX) 75 MG tablet TAKE 1 TABLET BY MOUTH DAILY 90 tablet 1    metoprolol tartrate (LOPRESSOR) 25 MG tablet TAKE 1 TABLET BY MOUTH TWICE DAILY 180 tablet 1    tadalafil (CIALIS) 20 MG tablet Take 1 tablet by mouth daily as needed for Erectile Dysfunction 30 tablet 3    pantoprazole (PROTONIX) 40 MG tablet TAKE 1 TABLET BY MOUTH DAILY 90 tablet 3    isosorbide mononitrate (IMDUR) 30 MG extended release tablet Take 1 tablet by mouth daily 90 tablet 3    albuterol sulfate HFA (PROAIR HFA) 108 (90 Base) MCG/ACT inhaler Inhale 2 puffs into the lungs every 6 hours as needed for Wheezing 1 Inhaler 3    nitroGLYCERIN (NITROSTAT) 0.3 MG SL tablet DISSOLVE 1 TABLET UNDER THE TONGUE EVERY 5 MINUTES AS NEEDED FOR CHEST PAIN UNTIL RELIEF IS OBTAINED, IF PAIN PERSISTS, CALL 911 100 tablet 0    ranolazine (RANEXA) 500 MG extended release tablet Take 500 mg by mouth 2 times daily      aspirin 81 MG chewable tablet Take 1 tablet by mouth daily 30 tablet 3       Phenytoin sodium extended  Social History     Tobacco Use   Smoking Status Former Smoker    Packs/day: 0.50    Years: 50.00    Pack years: 25.00    Types: Cigarettes    Quit date: 10/5/2018    Years since quitting: 3.0   Smokeless Tobacco Never Used       Social History Substance and Sexual Activity   Alcohol Use No    Alcohol/week: 0.0 standard drinks    Comment: Recovered alcoholic, quit in [address]:  Constitutional: negative  Eyes: negative  Respiratory: negative  Cardiovascular: negative  Gastrointestinal: negative  Musculoskeletal: negative  Genitourinary: negative except for what is in HPI  Skin: negative   Neurological: negative  Hematological/Lymphatic: negative  Psychological: negative    Physical Exam:    This a 76 y.o. male   Vitals:    10/11/21 0933   BP: 124/86   Pulse: 90     Constitutional: Patient in no acute distress; Neuro: alert and oriented to person place and time. Psych: Mood and affect normal.  Skin: Normal  Lungs: Respiratory effort normal  Cardiovascular:  Normal peripheral pulses  Abdomen: Soft, non-tender, non-distended with no CVA, flank pain, hepatosplenomegaly or hernia. Assessment and Plan      1. History of prostate cancer    2. Erectile dysfunction after radical prostatectomy           Plan:      No follow-ups on file. Cialis did not work. Discussed Trimix and he would like to proceed. PSA is 0. Mild stress leakage, not bothersome.             Dr. La Shields MD

## 2021-10-26 ENCOUNTER — HOSPITAL ENCOUNTER (OUTPATIENT)
Age: 66
Setting detail: SPECIMEN
Discharge: HOME | End: 2021-10-26
Payer: MEDICARE

## 2021-10-26 DIAGNOSIS — D50.8 IRON DEFICIENCY ANEMIA SECONDARY TO INADEQUATE DIETARY IRON INTAKE: ICD-10-CM

## 2021-10-26 DIAGNOSIS — D50.8 IRON DEFICIENCY ANEMIA SECONDARY TO INADEQUATE DIETARY IRON INTAKE: Primary | ICD-10-CM

## 2021-10-26 LAB
ABSOLUTE EOS #: 0.39 K/UL (ref 0–0.44)
ABSOLUTE IMMATURE GRANULOCYTE: 0.04 K/UL (ref 0–0.3)
ABSOLUTE LYMPH #: 1.59 K/UL (ref 1.1–3.7)
ABSOLUTE MONO #: 0.54 K/UL (ref 0.1–1.2)
BASOPHILS # BLD: 1 % (ref 0–2)
BASOPHILS ABSOLUTE: 0.04 K/UL (ref 0–0.2)
DIFFERENTIAL TYPE: ABNORMAL
EOSINOPHILS RELATIVE PERCENT: 7 % (ref 1–4)
FERRITIN: 59 UG/L (ref 30–400)
HCT VFR BLD CALC: 45.4 % (ref 40.7–50.3)
HEMOGLOBIN: 13.6 G/DL (ref 13–17)
IMMATURE GRANULOCYTES: 1 %
IRON SATURATION: 35 % (ref 20–55)
IRON: 101 UG/DL (ref 59–158)
LYMPHOCYTES # BLD: 27 % (ref 24–43)
MCH RBC QN AUTO: 27.8 PG (ref 25.2–33.5)
MCHC RBC AUTO-ENTMCNC: 30 G/DL (ref 28.4–34.8)
MCV RBC AUTO: 92.8 FL (ref 82.6–102.9)
MONOCYTES # BLD: 9 % (ref 3–12)
NRBC AUTOMATED: 0 PER 100 WBC
PDW BLD-RTO: 13.4 % (ref 11.8–14.4)
PLATELET # BLD: 276 K/UL (ref 138–453)
PLATELET ESTIMATE: ABNORMAL
PMV BLD AUTO: 9.2 FL (ref 8.1–13.5)
RBC # BLD: 4.89 M/UL (ref 4.21–5.77)
RBC # BLD: ABNORMAL 10*6/UL
SEG NEUTROPHILS: 55 % (ref 36–65)
SEGMENTED NEUTROPHILS ABSOLUTE COUNT: 3.23 K/UL (ref 1.5–8.1)
TOTAL IRON BINDING CAPACITY: 288 UG/DL (ref 250–450)
UNSATURATED IRON BINDING CAPACITY: 187 UG/DL (ref 112–347)
WBC # BLD: 5.8 K/UL (ref 3.5–11.3)
WBC # BLD: ABNORMAL 10*3/UL

## 2021-10-26 PROCEDURE — 83540 ASSAY OF IRON: CPT

## 2021-10-26 PROCEDURE — 83550 IRON BINDING TEST: CPT

## 2021-10-26 PROCEDURE — 36415 COLL VENOUS BLD VENIPUNCTURE: CPT

## 2021-10-26 PROCEDURE — 82728 ASSAY OF FERRITIN: CPT

## 2021-10-26 PROCEDURE — 85025 COMPLETE CBC W/AUTO DIFF WBC: CPT

## 2021-10-28 ENCOUNTER — HOSPITAL ENCOUNTER (OUTPATIENT)
Facility: MEDICAL CENTER | Age: 66
End: 2021-10-28
Payer: MEDICARE

## 2021-12-01 ENCOUNTER — HOSPITAL ENCOUNTER (OUTPATIENT)
Facility: MEDICAL CENTER | Age: 66
End: 2021-12-01

## 2021-12-07 ENCOUNTER — TELEPHONE (OUTPATIENT)
Dept: ONCOLOGY | Age: 66
End: 2021-12-07

## 2021-12-07 ENCOUNTER — OFFICE VISIT (OUTPATIENT)
Dept: ONCOLOGY | Age: 66
End: 2021-12-07
Payer: MEDICARE

## 2021-12-07 ENCOUNTER — TELEPHONE (OUTPATIENT)
Dept: INTERNAL MEDICINE | Age: 66
End: 2021-12-07

## 2021-12-07 VITALS
SYSTOLIC BLOOD PRESSURE: 117 MMHG | BODY MASS INDEX: 22.03 KG/M2 | WEIGHT: 162.4 LBS | HEART RATE: 79 BPM | DIASTOLIC BLOOD PRESSURE: 80 MMHG | TEMPERATURE: 97.3 F

## 2021-12-07 DIAGNOSIS — D50.8 IRON DEFICIENCY ANEMIA SECONDARY TO INADEQUATE DIETARY IRON INTAKE: Primary | ICD-10-CM

## 2021-12-07 PROCEDURE — 99214 OFFICE O/P EST MOD 30 MIN: CPT | Performed by: INTERNAL MEDICINE

## 2021-12-07 PROCEDURE — 99211 OFF/OP EST MAY X REQ PHY/QHP: CPT | Performed by: INTERNAL MEDICINE

## 2021-12-07 NOTE — PROGRESS NOTES
_        Chief Complaint   Patient presents with    Follow-up    Discuss Labs        DIAGNOSIS:        Iron deficiency anemia secondary to chronic GI blood loss  Status post GI bleeding  Gastric AV malformation     CURRENT THERAPY:         Status post blood transfusion in January 2018  Status post IV iron infusion in January 2018. April 2019. BRIEF CASE HISTORY:      Mr. Janelle Reed is a very pleasant 78 y.o. male with history of multiple comorbidities as listed. The patient was hospitalized in January 2018 because of extensive weakness and fatigue when he was found to have severe anemia. He had blood transfusion and he had GI workup which showed negative colonoscopy however he had gastric AV malformation which was cauterized. Patient received IV iron during hospitalization and he is maintained on oral iron at the present time. He feels much better. He has mild weakness. No dizziness. Patient has dark stool secondary to oral iron but no hematochezia. No melena or hematemesis. No palpitation. No other complaints. He had GI blood loss in January 2018. He had blood transfusion on iron infusion. INTERIM HISTORY:   The patient was seen for follow-up anemia. He is maintained on oral iron. Tolerated well. No GI bleeding. No melena or hematochezia. No hematemesis. No abdominal pain. He is maintained on aspirin. No clear evidence of active bleeding.      PAST MEDICAL HISTORY: has a past medical history of Alcohol withdrawal seizure ([address] Blood loss anemia, CAD (coronary artery disease), Cardiomyopathy ([address] Chest pain, Chronic kidney disease, CKD (chronic kidney disease), COPD (chronic obstructive pulmonary disease) ([address] Cough, Erectile dysfunction, History of transfusion of packed red blood cells, Hyperlipidemia, Hypertension, Nicotine dependence, Primary adenocarcinoma of prostate ([address] Urinary incontinence, Wears dentures, and Wears glasses. PAST SURGICAL HISTORY: has a past surgical history that includes Cardiac catheterization (2006); Endoscopy, colon, diagnostic; Upper gastrointestinal endoscopy (2008); Prostatectomy (2009); Cardiac catheterization (10/2015); Colonoscopy (2008); Inguinal hernia repair (Right); pr colon ca scrn not hi rsk ind (N/A, 9/19/2017); pr esophagogastroduodenoscopy transoral diagnostic (N/A, 9/19/2017); Upper gastrointestinal endoscopy (09/19/2017); Colonoscopy (09/19/2017); Colonoscopy (1/23/2018); Upper gastrointestinal endoscopy (1/23/2018); and Cardiac catheterization (10/03/2018). CURRENT MEDICATIONS:  has a current medication list which includes the following prescription(s): isosorbide mononitrate, atorvastatin, clopidogrel, amlodipine, docusate sodium, fluticasone-umeclidin-vilant, metoprolol tartrate, pantoprazole, albuterol sulfate hfa, ranolazine, aspirin, and nitroglycerin. ALLERGIES:  is allergic to phenytoin sodium extended. FAMILY HISTORY: Negative for any hematological or oncological conditions. SOCIAL HISTORY:  reports that he quit smoking about 3 years ago. His smoking use included cigarettes. He has a 25.00 pack-year smoking history. He has never used smokeless tobacco. He reports previous drug use. Frequency: 1.00 time per week. Drug: Marijuana Destineemyla Mcmillan). He reports that he does not drink alcohol. REVIEW OF SYSTEMS:     · General: No weakness or fatigue. No unanticipated weight loss or decreased appetite. No fever or chills. · Eyes: No blurred vision, eye pain or double vision. · Ears: No hearing problems or drainage. No tinnitus. · Throat: No sore throat, problems with swallowing or dysphagia. · Respiratory: No cough, sputum or hemoptysis. No shortness of breath. No pleuritic chest pain. · Cardiovascular: No chest pain, orthopnea or PND. No lower extremity edema. No palpitation. · Gastrointestinal: As above.     · Genitourinary: No dysuria, hematuria, frequency or urgency. · Musculoskeletal: No muscle aches or pains. No limitation of movement. No back pain. No gait disturbance, No joint complaints. · Dermatologic: No skin rashes or pruritus. No skin lesions or discolorations. · Psychiatric: No depression, anxiety, or stress or signs of schizophrenia. No change in mood or affect. · Hematologic: No history of bleeding tendency. No bruises or ecchymosis. No history of clotting problems. · Infectious disease: No fever, chills or frequent infections. · Endocrine: No problems with opacity. No polydipsia or polyuria. No temperature intolerance. · Neurologic: No headaches or dizziness. No weakness or numbness of the extremities. No changes in balance, coordination,  memory, mentation, behavior. · Allergic/Immunologic: No nasal congestion or hives. No repeated infections. PHYSICAL EXAM:  The patient is not in acute distress. Vital signs: Blood pressure 117/80, pulse 79, temperature 97.3 °F (36.3 °C), temperature source Temporal, weight 162 lb 6.4 oz (73.7 kg). HEENT:  Eyes are normal. Ears, nose and throat are normal.  Neck: Supple. No lymph node enlargement. No thyroid enlargement. Trachea is centrally located. Chest:  Clear to auscultation. No wheezes or crepitations. Heart: Regular sinus rhythm. Abdomen: Soft, nontender. No hepatosplenomegaly. No masses. Extremities:  With no edema. Lymph Nodes:  No cervical, axillary or inguinal lymph node enlargement. Neurologic:  Conscious and oriented. No focal neurological deficits. Psychosocial: No depression, anxiety or stress. Skin: No rashes, bruises or ecchymoses. Review of Diagnostic data:   No results for input(s): WBC, HGB, HCT, MCV, PLT in the last 720 hours.   Lab Results   Component Value Date    IRON 101 10/26/2021    TIBC 288 10/26/2021    FERRITIN 59 10/26/2021         IMPRESSION:   Iron deficiency anemia secondary to chronic GI blood loss  Status post GI bleeding  Gastric AV malformation  Status post cardiac cath and coronary stents placement. PLAN: I reviewed the labs as above and discussed with the patient. I explained to the patient the nature of this hematologic problem. Patient had severe iron deficiency anemia related to GI blood loss in January 2018. He had excellent response to IV iron infusion and he had blood transfusion that time. Repeated labs showed stable disease. No clear evidence of bleeding. We will monitor closely. Will continue Protonix. Patient will continue to take aspirin and Plavix. Status post recent cardiac stent placement. He will also take Protonix. Labs will be repeated in 6 months. Sooner if he develops any problems. He will have continued monitoring since it is possible for him to have repeated episodes of bleeding from AV malformation. Patient understands and agrees. Patient's questions were answered to the best of his satisfaction and he verbalized full understanding and agreement.

## 2021-12-07 NOTE — TELEPHONE ENCOUNTER
Arpan Moore MD VISIT  DR Natalie Jarrell IN TO SEE PATIENT  ORDERS RECEIVED  RV 6 MONTHS WITH LABS BEFORE  LABS CDP FE TIBC FERRITIN 06/02/22, ORDERS MAILED TO PATIENT  MD VISIT 06/09/22 @1PM  AVS PRINTED AND GIVEN TO PATIENT WITH INSTRUCTIONS  PATIENT DISCHARGED AMBULATORY

## 2021-12-15 ENCOUNTER — TELEPHONE (OUTPATIENT)
Dept: INTERNAL MEDICINE | Age: 66
End: 2021-12-15

## 2021-12-15 NOTE — TELEPHONE ENCOUNTER
Patient was put on the wait list to be scheduled. Left a voicemail for patient to call the office so the patient can be scheduled for an AWV .

## 2021-12-21 ENCOUNTER — VIRTUAL VISIT (OUTPATIENT)
Dept: INTERNAL MEDICINE | Age: 66
End: 2021-12-21
Payer: MEDICARE

## 2021-12-21 DIAGNOSIS — Z00.00 ROUTINE GENERAL MEDICAL EXAMINATION AT A HEALTH CARE FACILITY: Primary | ICD-10-CM

## 2021-12-21 PROCEDURE — G0439 PPPS, SUBSEQ VISIT: HCPCS | Performed by: INTERNAL MEDICINE

## 2021-12-21 ASSESSMENT — PATIENT HEALTH QUESTIONNAIRE - PHQ9
SUM OF ALL RESPONSES TO PHQ QUESTIONS 1-9: 0
SUM OF ALL RESPONSES TO PHQ QUESTIONS 1-9: 0
1. LITTLE INTEREST OR PLEASURE IN DOING THINGS: 0
SUM OF ALL RESPONSES TO PHQ9 QUESTIONS 1 & 2: 0
2. FEELING DOWN, DEPRESSED OR HOPELESS: 0
SUM OF ALL RESPONSES TO PHQ QUESTIONS 1-9: 0

## 2021-12-21 ASSESSMENT — LIFESTYLE VARIABLES: HOW OFTEN DO YOU HAVE A DRINK CONTAINING ALCOHOL: 0

## 2021-12-21 NOTE — PROGRESS NOTES
Medicare Annual Wellness Visit  Name: Junior Stringer Date: 2021   MRN: [id number] Sex: Male   Age: 78 y.o. Ethnicity: Non- / Non    : 1955 Race: Black / African American      Lucy Franco is here for Medicare AWV    Screenings for behavioral, psychosocial and functional/safety risks, and cognitive dysfunction are all negative except as indicated below. These results, as well as other patient data from the [address] form, are documented in Flowsheets linked to this Encounter. Allergies   Allergen Reactions    Phenytoin Sodium Extended Rash       Prior to Visit Medications    Medication Sig Taking?  Authorizing Provider   isosorbide mononitrate (IMDUR) 30 MG extended release tablet TAKE 1 TABLET BY MOUTH DAILY Yes Ivelisse Zaragoza MD   atorvastatin (LIPITOR) 40 MG tablet TAKE 1 TABLET BY MOUTH DAILY Yes Ivelisse Zaragoza MD   clopidogrel (PLAVIX) 75 MG tablet TAKE 1 TABLET BY MOUTH DAILY Yes Ivelisse Zaragoza MD   amLODIPine (NORVASC) 5 MG tablet TAKE 1 TABLET BY MOUTH DAILY Yes Ivelisse Zaragoza MD   docusate sodium (STOOL SOFTENER) 100 MG capsule TAKE 1 CAPSULE BY MOUTH TWICE DAILY Yes Ivelisse Zaragoza MD   fluticasone-umeclidin-vilant (TRELEGY ELLIPTA) 100-62.5-25 MCG/INH AEPB Inhale 1 puff into the lungs daily Yes Ivelisse Zaragoza MD   metoprolol tartrate (LOPRESSOR) 25 MG tablet TAKE 1 TABLET BY MOUTH TWICE DAILY Yes Ivelisse Zaragoza MD   pantoprazole (PROTONIX) 40 MG tablet TAKE 1 TABLET BY MOUTH DAILY Yes Lacey Martinez MD   albuterol sulfate HFA (PROAIR HFA) 108 (90 Base) MCG/ACT inhaler Inhale 2 puffs into the lungs every 6 hours as needed for Wheezing Yes Ivelisse Zaragoza MD   ranolazine (RANEXA) 500 MG extended release tablet Take 500 mg by mouth 2 times daily Yes Historical Provider, MD   aspirin 81 MG chewable tablet Take 1 tablet by mouth daily Yes LINO Dumont - CNP   nitroGLYCERIN (NITROSTAT) 0.3 MG SL tablet DISSOLVE 1 TABLET UNDER THE TONGUE EVERY 5 MINUTES AS NEEDED FOR CHEST PAIN UNTIL RELIEF IS OBTAINED, IF PAIN PERSISTS, CALL 911  Patient not taking: Reported on 12/7/2021  Indigo Bingham MD       Past Medical History:   Diagnosis Date    Alcohol withdrawal seizure ([facility] 1991    quit ETOH since 1991    Blood loss anemia 2008    transfusion from chronic plavix and asa use    CAD (coronary artery disease) 2006    angioplast with drug eluting stent to l circumflex     Cardiomyopathy ([facility]     Chest pain     Chronic kidney disease     CKD (chronic kidney disease) 8/9/2016    COPD (chronic obstructive pulmonary disease) ([facility] 08/2012    severe obstructive disease with bronchospasm on PFT    Cough     \"smoker\"    Erectile dysfunction     History of transfusion of packed red blood cells 2009    s/p prostate surgery    Hyperlipidemia     Hypertension     Nicotine dependence     Primary adenocarcinoma of prostate ([facility] 2009    tucker stage 6 s/p lap prostatectomy    Urinary incontinence     Wears dentures     upper and lower    Wears glasses        Past Surgical History:   Procedure Laterality Date    CARDIAC CATHETERIZATION  2006    drug eluting stent to LCX STENT X1    CARDIAC CATHETERIZATION  10/2015    STENT WAS PATENT    CARDIAC CATHETERIZATION  10/03/2018    STENT X1    COLONOSCOPY  2008    int hemorrhoids, tiny sigmoid polyp    COLONOSCOPY  09/19/2017    Normal    COLONOSCOPY  1/23/2018    COLONOSCOPY WITH BIOPSY performed by Jessica Kaur MD at [facility]    ENDOSCOPY, COLON, DIAGNOSTIC      INGUINAL HERNIA REPAIR Right     [place] COLON CA SCRN NOT  [address] [place] [address] IND N/A 9/19/2017    COLONOSCOPY performed by Jodee Fierro MD at [address] [facility] ESOPHAGOGASTRODUODENOSCOPY TRANSORAL DIAGNOSTIC N/A 9/19/2017    EGD ESOPHAGOGASTRODUODENOSCOPY performed by Jodee Fierro MD at [address] [place] [address]  2009    adenocarcinoma tucker stage 6    UPPER GASTROINTESTINAL ENDOSCOPY  2008    normal    UPPER GASTROINTESTINAL ENDOSCOPY  09/19/2017    The cardia, fundus, incisura, antrum and pylorus were identified via direct visualization. The endoscopic exam showed 6 small to medium AVMs. Hemostasis was achieved by BiCap cautery. Two hemoclips were placed.  UPPER GASTROINTESTINAL ENDOSCOPY  1/23/2018    EGD BIOPSY performed by Goyo Rowan MD at [facility]       Family History   Problem Relation Age of Onset    High Blood Pressure Mother     Heart Disease Mother         CHF    Substance Abuse Father         etoh cirrhosis       CareTeam (Including outside providers/suppliers regularly involved in providing care):   Patient Care Team:  Willem Costa MD as PCP - General (Internal Medicine)  Willem Costa MD as PCP - [facility] Empaneled Provider  LINO Brown - CNP as Nurse Practitioner (Cardiology)  Bren Arceo RN as   Yanet Seay MD as Consulting Physician (Gastroenterology)    Wt Readings from Last 3 Encounters:   12/07/21 162 lb 6.4 oz (73.7 kg)   10/11/21 157 lb (71.2 kg)   07/13/21 157 lb (71.2 kg)     There were no vitals filed for this visit. There is no height or weight on file to calculate BMI. Based upon direct observation of the patient, evaluation of cognition reveals recent and remote memory intact. Patient's complete Health Risk Assessment and screening values have been reviewed and are found in Flowsheets. The following problems were reviewed today and where indicated follow up appointments were made and/or referrals ordered. Positive Risk Factor Screenings with Interventions:          General Health and ACP:  General  In general, how would you say your health is?: Fair  In the past 7 days, have you experienced any of the following?  New or Increased Pain, New or Increased Fatigue, Loneliness, Social Isolation, Stress or Anger?: None of These  Do you get the social and emotional support that you need?: Yes  Do you have a Living Will?: (!) No (not interested at this time--his sister is aware of what he wants done)  Advance Directives     Power of  Living Will ACP-Advance Directive ACP-Power of     Not on File Not on File Not on File Not on File      General Health Risk Interventions:  · No Living Will: Patient declines ACP discussion/assistance--says his sister is aware of his wishes. Pt advised to call office for referral if he decides he would like assistance with ACP docs.     Health Habits/Nutrition:  Health Habits/Nutrition  Do you exercise for at least 20 minutes 2-3 times per week?: (!) No (educational materials will be sent)  Have you lost any weight without trying in the past 3 months?: No  Do you eat only one meal per day?: No  Have you seen the dentist within the past year?: N/A - wear dentures     Health Habits/Nutrition Interventions:  · Inadequate physical activity:  educational materials provided to promote increased physical activity     Safety:  Safety  Do you have working smoke detectors?: Yes  Have all throw rugs been removed or fastened?: Yes  Do you have non-slip mats or surfaces in all bathtubs/showers?: (!) No (education given)  Do all of your stairways have a railing or banister?: Yes  Are your doorways, halls and stairs free of clutter?: Yes  Do you always fasten your seatbelt when you are in a car?: Yes  Safety Interventions:  · Home safety tips provided     Personalized Preventive Plan   Current Health Maintenance Status  Immunization History   Administered Date(s) Administered    COVID-19ESTHER PF, 0.5 mL 03/15/2021    COVID-19Geoff PF, 30mcg/0.3mL 10/29/2021    Influenza 11/27/2013    Influenza Virus Vaccine 11/27/2013, 12/11/2014, 09/30/2015, 11/08/2016    Influenza, High-dose, Pedro Spine, 65 yrs +, IM (Fluzone) 09/03/2020    Influenza, Quadv, IM, (6 mo and older Fluzone, Flulaval, Fluarix and 3 yrs and older Afluria) 11/08/2016, 11/22/2019    Influenza, Quadv, IM, PF (6 mo and older Fluzone, Flulaval, Fluarix, and 3 yrs and older Afluria) 01/23/2018, 10/25/2018    Pneumococcal Conjugate 13-valent ([id number]) 11/22/2019    Pneumococcal Polysaccharide ([id number]) 11/27/2013, 07/13/2021    Tdap (Boostrix, Adacel) 09/03/2020        Health Maintenance   Topic Date Due    Lipid screen  06/03/2021    Annual Wellness Visit (AWV)  07/22/2021    Flu vaccine (1) 09/01/2021    Shingles Vaccine (1 of 2) 07/13/2022 (Originally 10/12/2004)    A1C test (Diabetic or Prediabetic)  03/18/2022    Low dose CT lung screening  04/09/2022    PSA counseling  10/07/2022    Colon cancer screen colonoscopy  01/23/2028    DTaP/Tdap/Td vaccine (2 - Td or Tdap) 09/03/2030    Pneumococcal 65+ years Vaccine  Completed    COVID-19 Vaccine  Completed    AAA screen  Completed    Hepatitis C screen  Completed    Hepatitis A vaccine  Aged Out    Hepatitis B vaccine  Aged Out    Hib vaccine  Aged Out    Meningococcal (ACWY) vaccine  Aged Out     Recommendations for Redeemia Due: see orders and patient instructions/AVS.  . Recommended screening schedule for the next 5-10 years is provided to the patient in written form: see Patient Instructions/AVS.    I, Eliana Bailey RN, 12/21/2021, performed the documented evaluation under the direct supervision of the attending physician.

## 2021-12-21 NOTE — PATIENT INSTRUCTIONS
Personalized Preventive Plan for Galileo Luis - 12/21/2021  Medicare offers a range of preventive health benefits. Some of the tests and screenings are paid in full while other may be subject to a deductible, co-insurance, and/or copay. Some of these benefits include a comprehensive review of your medical history including lifestyle, illnesses that may run in your family, and various assessments and screenings as appropriate. After reviewing your medical record and screening and assessments performed today your provider may have ordered immunizations, labs, imaging, and/or referrals for you. A list of these orders (if applicable) as well as your Preventive Care list are included within your After Visit Summary for your review. Other Preventive Recommendations:    · A preventive eye exam performed by an eye specialist is recommended every 1-2 years to screen for glaucoma; cataracts, macular degeneration, and other eye disorders. · A preventive dental visit is recommended every 6 months. · Try to get at least 150 minutes of exercise per week or 10,000 steps per day on a pedometer . · Order or download the FREE \"Exercise & Physical Activity: Your Everyday Guide\" from [employer]. Call 6-849.994.6184 or search [employer] online. · You need 0347-6676 mg of calcium and 6689-0157 IU of vitamin D per day. It is possible to meet your calcium requirement with diet alone, but a vitamin D supplement is usually necessary to meet this goal.  · When exposed to the sun, use a sunscreen that protects against both UVA and UVB radiation with an SPF of 30 or greater. Reapply every 2 to 3 hours or after sweating, drying off with a towel, or swimming. · Always wear a seat belt when traveling in a car. Always wear a helmet when riding a bicycle or motorcycle. Patient Education        Well Visit, Over 72: Care Instructions  Overview     Well visits can help you stay healthy.  Your doctor has checked your overall health and may have suggested ways to take good care of yourself. Your doctor also may have recommended tests. At home, you can help prevent illness with healthy eating, regular exercise, and other steps. Follow-up care is a key part of your treatment and safety. Be sure to make and go to all appointments, and call your doctor if you are having problems. It's also a good idea to know your test results and keep a list of the medicines you take. How can you care for yourself at home? Get screening tests that you and your doctor decide on. Screening helps find diseases before any symptoms appear. Eat healthy foods. Choose fruits, vegetables, whole grains, protein, and low-fat dairy foods. Limit fat, especially saturated fat. Reduce salt in your diet. Limit alcohol. If you are a man, have no more than 2 drinks a day or 14 drinks a week. If you are a woman, have no more than 1 drink a day or 7 drinks a week. Since alcohol affects older adults differently, you may want to limit alcohol even more. Or you may not want to drink at all. Get at least 30 minutes of exercise on most days of the week. Walking is a good choice. You also may want to do other activities, such as running, swimming, cycling, or playing tennis or team sports. Reach and stay at a healthy weight. This will lower your risk for many problems, such as obesity, diabetes, heart disease, and high blood pressure. Do not smoke. Smoking can make health problems worse. If you need help quitting, talk to your doctor about stop-smoking programs and medicines. These can increase your chances of quitting for good. Care for your mental health. It is easy to get weighed down by worry and stress. Learn strategies to manage stress, like deep breathing and mindfulness, and stay connected with your family and community. If you find you often feel sad or hopeless, talk with your doctor. Treatment can help.   Talk to your doctor about whether you have any risk factors for sexually transmitted infections (STIs). You can help prevent STIs if you wait to have sex with a new partner (or partners) until you've each been tested for STIs. It also helps if you use condoms (male or female condoms) and if you limit your sex partners to one person who only has sex with you. Vaccines are available for some STIs. If you think you may have a problem with alcohol or drug use, talk to your doctor. This includes prescription medicines (such as amphetamines and opioids) and illegal drugs (such as cocaine and methamphetamine). Your doctor can help you figure out what type of treatment is best for you. Protect your skin from too much sun. When you're outdoors from 10 a.m. to 4 p.m., stay in the shade or cover up with clothing and a hat with a wide brim. Wear sunglasses that block UV rays. Even when it's cloudy, put broad-spectrum sunscreen (SPF 30 or higher) on any exposed skin. See a dentist one or two times a year for checkups and to have your teeth cleaned. Wear a seat belt in the car. When should you call for help? Watch closely for changes in your health, and be sure to contact your doctor if you have any problems or symptoms that concern you. Where can you learn more? Go to [email or web address] [email or web address].[email or web address] [email or web address]. org and sign in to your easy2comply ([employer]) account. Enter L375 in the [facility] box to learn more about \"Well Visit, Over 65: Care Instructions. \"     If you do not have an account, please click on the \"Sign Up Now\" link. Current as of: February 11, 2021               Content Version: 13.0  © 3061-8720 Healthwise, Incorporated. Care instructions adapted under license by [facility] ([facility]). If you have questions about a medical condition or this instruction, always ask your healthcare professional. [place] [address] any warranty or liability for your use of this information.          Patient Education        Advance Care Planning: Care Instructions  Your Care Instructions     It can be hard to live with an illness that cannot be cured. But if your health is getting worse, you may want to make decisions about end-of-life care. Planning for the end of your life does not mean that you are giving up. It is a way to make sure that your wishes are met. Clearly stating your wishes can make it easier for your loved ones. Making plans while you are still able may also ease your mind and make your final days less stressful and more meaningful. Follow-up care is a key part of your treatment and safety. Be sure to make and go to all appointments, and call your doctor if you are having problems. It's also a good idea to know your test results and keep a list of the medicines you take. What can you do to plan for the end of life? You can bring these issues up with your doctor. You do not need to wait until your doctor starts the conversation. You might start with, \"What makes life worth living for me is. Pinky Angles Pinky Angles \" And then follow it with, \"I would not be willing to live with . Pinky Angles Pinky Angles Pinky Angles \" When you complete this sentence it helps your doctor understand your wishes. Talk openly and honestly with your doctor. This is the best way to understand the decisions you will need to make as your health changes. Know that you can always change your mind. Ask your doctor about commonly used life-support measures. These include tube feedings, breathing machines, and fluids given through a vein (IV). Understanding these treatments will help you decide whether you want them. You may choose to have these life-supporting treatments for a limited time. This allows a trial period to see whether they will help you. You may also decide that you want your doctor to take only certain measures to keep you alive. It may help to think about the big picture, like what makes life worth living for you or what your values and goals are. Talk to your doctor about how long you are likely to live. Your doctor may be able to give you an idea of what usually happens with your specific illness. Think about preparing papers that state your wishes. These papers are called advance directives. If you do this early and review them often, there will not be any confusion about what you want. You can change your instructions at any time. Which papers should you prepare? Advance directives are legal papers that tell doctors how you want to be cared for at the end of your life. You do not need a  to write these papers. Ask your doctor or your state [facility] department for information on how to write your advance directives. They may have the forms for each of these types of papers. Make sure your doctor has a copy of these on file, and give a copy to a family member or close friend. Consider a do-not-resuscitate order (DNR). This order asks that no extra treatments be done if your heart stops or you stop breathing. Extra treatments may include cardiopulmonary resuscitation (CPR), electrical shock to restart your heart, or a machine to breathe for you. If you decide to have a DNR order, ask your doctor to explain and write it. Place the order in your home where everyone can easily see it. Consider a living will. A living will explains your wishes about life support and other treatments at the end of your life if you become unable to speak for yourself. Living rueda tell doctors to use or not use treatments that would keep you alive. You must have one or two witnesses or a notary present when you sign this form. A living will may be called something else in your state. Consider a medical power of . This form allows you to name a person to make decisions about your care if you are not able to. Most people ask a close friend or family member. Talk to this person about the kinds of treatments you want and those that you do not want. Make sure this person understands your wishes.  A medical power of  may be called something else in your state. These legal papers are simple to change. Tell your doctor what you want to change, and ask him or her to make a note in your medical file. Give your family updated copies of the papers. Where can you learn more? Go to https://chpepiceweb.[email or web address]. org and sign in to your [employer] account. Enter P184 in the [employer] box to learn more about \"Advance Care Planning: Care Instructions. \"     If you do not have an account, please click on the \"Sign Up Now\" link. Current as of: March 17, 2021               Content Version: 13.0  © 6620-7381 Healthwise, [email or web address].[email or web address]. Care instructions adapted under license by [facility] ([facility]). If you have questions about a medical condition or this instruction, always ask your healthcare professional. [address] any warranty or liability for your use of this information. Patient Education        Learning About Jay Cunha  What is a living will? A living will, also called a declaration, is a legal form. It tells your family and your doctor your wishes when you can't speak for yourself. It's used by the health professionals who will treat you as you near the end of your life or if you get seriously hurt or ill. If you put your wishes in writing, your loved ones and others will know what kind of care you want. They won't need to guess. This can ease your mind and be helpful to others. And you can change or cancel your living will at any time. A living will is not the same as an estate or property will. An estate will explains what you want to happen with your money and property after you die. How do you use it? A living will is used to describe the kinds of treatment or life support you want as you near the end of your life or if you get seriously hurt or ill. Keep these facts in mind about living rueda. Your living will is used only if you can't speak or make decisions for yourself. Most often, one or more doctors must certify that you can't speak or decide for yourself before your living will takes effect. If you get better and can speak for yourself again, you can accept or refuse any treatment. It doesn't matter what you said in your living will. Some states may limit your right to refuse treatment in certain cases. For example, you may need to clearly state in your living will that you don't want artificial hydration and nutrition, such as being fed through a tube. Is a living will a legal document? A living will is a legal document. Each state has its own laws about living rueda. And a living will may be called something else in your state. Here are some things to know about living rueda. You don't need an  to complete a living will. But legal advice can be helpful if your state's laws are unclear. It can also help if your health history is complicated or your family can't agree on what should be in your living will. You can change your living will at any time. Some people find that their wishes about end-of-life care change as their health changes. If you make big changes to your living will, complete a new form. If you move to another state, make sure that your living will is legal in the state where you now live. In most cases, doctors will respect your wishes even if you have a form from a different state. You might use a universal form that has been approved by many states. This kind of form can sometimes be filled out and stored online. Your digital copy will then be available wherever you have a connection to the internet. The doctors and nurses who need to treat you can find it right away. Your state may offer an online registry. This is another place where you can store your living will online. It's a good idea to get your living will notarized. This means using a person called a  to watch two people sign, or witness, your living will.   What should you know when you create a living will? Here are some questions to ask yourself as you make your living will:  Do you know enough about life support methods that might be used? If not, talk to your doctor so you know what might be done if you can't breathe on your own, your heart stops, or you can't swallow. What things would you still want to be able to do after you receive life-support methods? Would you want to be able to walk? To speak? To eat on your own? To live without the help of machines? Do you want certain Episcopalian practices performed if you become very ill? If you have a choice, where do you want to be cared for? In your home? At a hospital or nursing home? If you have a choice at the end of your life, where would you prefer to die? At home? In a hospital or nursing home? Somewhere else? Would you prefer to be buried or cremated? Do you want your organs to be donated after you die? What should you do with your living will? Make sure that your family members and your health care agent have copies of your living will (also called a declaration). Give your doctor a copy of your living will. Ask him or her to keep it as part of your medical record. If you have more than one doctor, make sure that each one has a copy. Put a copy of your living will where it can be easily found. For example, some people may put a copy on their refrigerator door. If you are using a digital copy, be sure your doctor, family members, and health care agent know how to find and access it. Where can you learn more? Go to https://lesvia.Entertainment Magpie. org and sign in to your [employer] account. Enter I743 in the [employer] box to learn more about \"Learning About Living Joseph Crystal. \"     If you do not have an account, please click on the \"Sign Up Now\" link. Current as of: March 17, 2021               Content Version: 13.0  © 6835-4699 Healthwise, Incorporated.    Care instructions adapted under license by [address] If you have questions about a medical condition or this instruction, always ask your healthcare professional. [place] [address] any warranty or liability for your use of this information. Patient Education        Learning About Medical Power of Aarti Armond  What is a medical power of ? A medical power of , also called a durable power of  for health care, is one type of the legal forms called advance directives. It lets you name the person you want to make treatment decisions for you if you can't speak or decide for yourself. The person you choose is called your health care agent. This person is also called a health care proxy or health care surrogate. A medical power of  may be called something else in your state. How do you choose a health care agent? Choose your health care agent carefully. This person may or may not be a family member. Talk to the person before you make your final decision. Make sure he or she is comfortable with this responsibility. It's a good idea to choose someone who:  Is at least 25years old. Knows you well and understands what makes life meaningful for you. Understands your Adventist and moral values. Will do what you want, not what he or she wants. Will be able to make difficult choices at a stressful time. Will be able to refuse or stop treatment, if that is what you would want, even if you could die. Will be firm and confident with health professionals if needed. Will ask questions to get needed information. Lives near you or agrees to travel to you if needed. Your family may help you make medical decisions while you can still be part of that process. But it's important to choose one person to be your health care agent in case you aren't able to make decisions for yourself. If you don't fill out the legal form and name a health care agent, the decisions your family can make may be limited.   A health care agent may be called something else in your state. Who will make decisions for you if you don't have a health care agent? If you don't have a health care agent or a living will, you may not get the care you want. Decisions may be made by family members who disagree about your medical care. Or decisions may be made by a medical professional who doesn't know you well. In some cases, a  makes the decisions. When you name a health care agent, it is very clear who has the power to make health decisions for you. How do you name a health care agent? You name your health care agent on a legal form. This form is usually called a medical power of . Ask your hospital, state bar association, or office on aging where to find these forms. You must sign the form to make it legal. Some states require you to get the form notarized. This means that a person called a  watches you sign the form and then he or she signs the form. Some states also require that two or more witnesses sign the form. Be sure to tell your family members and doctors who your health care agent is. Where can you learn more? Go to https://[email or web address].[email or web address] org and sign in to your [employer] account. Enter [id number] in the [facility] box to learn more about \"Learning About [address]. \"     If you do not have an account, please click on the \"Sign Up Now\" link. Current as of: March 17, 2021               Content Version: 13.0  © 2006-2021 Healthwise, Incorporated. Care instructions adapted under license by [facility] ([facility]). If you have questions about a medical condition or this instruction, always ask your healthcare professional. [place] [address] any warranty or liability for your use of this information.          Patient Education        Walking for Exercise: Care Instructions  Your Care Instructions     Walking is one of the easiest ways to get the exercise you need for good health. A brisk, 30-minute walk each day can help you feel better and have more energy. It can help you lower your risk of disease. Walking can help you keep your bones strong and your heart healthy. Check with your doctor before you start a walking plan if you have heart problems, other health issues, or you have not been active in a long time. Follow your doctor's instructions for safe levels of exercise. Follow-up care is a key part of your treatment and safety. Be sure to make and go to all appointments, and call your doctor if you are having problems. It's also a good idea to know your test results and keep a list of the medicines you take. How can you care for yourself at home? Getting started  Start slowly and set a short-term goal. For example, walk for 5 or 10 minutes every day. Bit by bit, increase the amount you walk every day. Try for at least 30 minutes on most days of the week. You also may want to swim, bike, or do other activities. If finding enough time is a problem, it's fine to be active in shorter periods of time throughout your day. To get the heart-healthy benefits of walking, you need to walk briskly enough to increase your heart rate and breathing, but not so fast that you can't talk comfortably. Wear comfortable shoes that fit well and provide good support for your feet and ankles. Staying with your plan  After you've made walking a habit, set a longer-term goal. You may want to set a goal of walking briskly for longer or walking farther. Experts say to do 2½ hours (150 minutes) of moderate activity a week. A faster heartbeat is what defines moderate-level activity. To stay motivated, walk with friends, coworkers, or pets. Use a phone adrienne or pedometer to track your steps each day. Set a goal to increase your steps. When you reach that goal, set a higher goal.  If the weather keeps you from walking outside, go for walks at the mall with a friend.  Local schools and churches may have indoor gyms where you can walk. Fitting a walk into your workday  Park several blocks away from work, or get off the bus a few stops early. Use the stairs instead of the elevator, at least for a few floors. Suggest holding meetings with colleagues during a walk inside or outside the building. Use the restroom that is the farthest from your desk or workstation. Use your morning and afternoon breaks to take quick 15 minutes walks. Staying safe  Know your surroundings. Walk in a well-lighted, safe place. If it's dark, walk with a partner. Wear light-colored clothing. If you can, buy a vest or jacket that reflects light. Carry a cell phone for emergencies. Drink plenty of water. Take a water bottle with you when you walk. This is very important if it is hot out. Be careful not to slip on wet or icy ground. You can buy \"grippers\" for your shoes to help keep you from slipping. Pay attention to your walking surface. Use sidewalks and paths. If you have health issues such as asthma, COPD, or heart problems, or if you haven't been active for a long time, check with your doctor before you start a new activity. Where can you learn more? Go to https://Picsean.Reata Pharmaceuticals. org and sign in to your [employer] account. Enter R159 in the [employer] box to learn more about \"Walking for Exercise: Care Instructions. \"     If you do not have an account, please click on the \"Sign Up Now\" link. Current as of: May 12, 2021               Content Version: 13.0  © 5852-0744 Healthwise, Incorporated. Care instructions adapted under license by [facility] ([facility]). If you have questions about a medical condition or this instruction, always ask your healthcare professional. [place] [address] any warranty or liability for your use of this information.          Patient Education        DASH Diet: Care Instructions  Your Care Instructions     The DASH diet is an eating plan that can help lower your blood possible. Limit lean meat, poultry, and fish to 2 servings each day. A serving is 3 ounces, about the size of a deck of cards. Eat 4 to 5 servings of nuts, seeds, and legumes (cooked dried beans, lentils, and split peas) each week. A serving is 1/3 cup of nuts, 2 tablespoons of seeds, or ½ cup of cooked beans or peas. Limit fats and oils to 2 to 3 servings each day. A serving is 1 teaspoon of vegetable oil or 2 tablespoons of salad dressing. Limit sweets and added sugars to 5 servings or less a week. A serving is 1 tablespoon jelly or jam, ½ cup sorbet, or 1 cup of lemonade. Eat less than 2,300 milligrams (mg) of sodium a day. If you limit your sodium to 1,500 mg a day, you can lower your blood pressure even more. Be aware that all of these are the suggested number of servings for people who eat 1,800 to 2,000 calories a day. Your recommended number of servings may be different if you need more or fewer calories. Tips for success  Start small. Do not try to make dramatic changes to your diet all at once. You might feel that you are missing out on your favorite foods and then be more likely to not follow the plan. Make small changes, and stick with them. Once those changes become habit, add a few more changes. Try some of the following:  Make it a goal to eat a fruit or vegetable at every meal and at snacks. This will make it easy to get the recommended amount of fruits and vegetables each day. Try yogurt topped with fruit and nuts for a snack or healthy dessert. Add lettuce, tomato, cucumber, and onion to sandwiches. Combine a ready-made pizza crust with low-fat mozzarella cheese and lots of vegetable toppings. Try using tomatoes, squash, spinach, broccoli, carrots, cauliflower, and onions. Have a variety of cut-up vegetables with a low-fat dip as an appetizer instead of chips and dip. Sprinkle sunflower seeds or chopped almonds over salads.  Or try adding chopped walnuts or almonds to cooked vegetables. Try some vegetarian meals using beans and peas. Add garbanzo or kidney beans to salads. Make burritos and tacos with mashed castro beans or black beans. Where can you learn more? Go to [email or web address] org and sign in to your [employer] account. Enter J633 in the [employer] box to learn more about \"DASH Diet: Care Instructions. \"     If you do not have an account, please click on the \"Sign Up Now\" link. Current as of: April 29, 2021               Content Version: 13.0  © 2306-7807 Healthwise, Incorporated. Care instructions adapted under license by [facility] ([facility]). If you have questions about a medical condition or this instruction, always ask your healthcare professional. [address] any warranty or liability for your use of this information.

## 2022-01-06 DIAGNOSIS — J44.9 STAGE 3 SEVERE COPD BY GOLD CLASSIFICATION (HCC): ICD-10-CM

## 2022-01-06 RX ORDER — FLUTICASONE FUROATE, UMECLIDINIUM BROMIDE AND VILANTEROL TRIFENATATE 100; 62.5; 25 UG/1; UG/1; UG/1
POWDER RESPIRATORY (INHALATION)
Qty: 60 EACH | Refills: 5 | Status: SHIPPED | OUTPATIENT
Start: 2022-01-06 | End: 2022-02-15 | Stop reason: SDUPTHER

## 2022-01-06 NOTE — TELEPHONE ENCOUNTER
Refill request for Vazquez Florez 100-62.5-25 MCG/INH AEPB. If appropriate please send medication(s) to patients pharmacy. Next appt: 2/15/2022      Health Maintenance   Topic Date Due    Lipid screen  06/03/2021    Flu vaccine (1) 09/01/2021    Shingles Vaccine (1 of 2) 07/13/2022 (Originally 10/12/2004)    A1C test (Diabetic or Prediabetic)  03/18/2022    Low dose CT lung screening  04/09/2022    PSA counseling  10/07/2022    Depression Screen  12/21/2022    Annual Wellness Visit (AWV)  12/22/2022    Colon cancer screen colonoscopy  01/23/2028    DTaP/Tdap/Td vaccine (2 - Td or Tdap) 09/03/2030    Pneumococcal 65+ years Vaccine  Completed    COVID-19 Vaccine  Completed    AAA screen  Completed    Hepatitis C screen  Completed    Hepatitis A vaccine  Aged Out    Hepatitis B vaccine  Aged Out    Hib vaccine  Aged Out    Meningococcal (ACWY) vaccine  Aged Out       Hemoglobin A1C (%)   Date Value   03/18/2021 6.2 (H)   06/03/2020 6.3 (H)   06/04/2019 4.7             ( goal A1C is < 7)   Microalb/Crt. Ratio (mcg/mg creat)   Date Value   07/27/2018 CANNOT BE CALCULATED     LDL Cholesterol (mg/dL)   Date Value   06/03/2020 51       (goal LDL is <100)   AST (U/L)   Date Value   06/03/2020 15     ALT (U/L)   Date Value   06/03/2020 11     BUN (mg/dL)   Date Value   03/18/2021 12     BP Readings from Last 3 Encounters:   12/07/21 117/80   10/11/21 124/86   07/13/21 114/77          (goal 120/80)          Patient Active Problem List:     HTN (hypertension)     Prostate cancer adenocarcinoma. [place] stage 6 staus post laparoscopic prostatectomy 2009.      History of right  inguinal hernia repair     Stage 3 severe COPD by GOLD classification ([address]     Erectile dysfunction     Hyperlipidemia     CKD (chronic kidney disease)     Prediabetes     History of colon polyps     CAD in native artery     Iron deficiency anemia secondary to inadequate dietary iron intake     Malabsorption

## 2022-02-12 DIAGNOSIS — I25.10 CAD S/P PERCUTANEOUS CORONARY ANGIOPLASTY: ICD-10-CM

## 2022-02-12 DIAGNOSIS — I25.5 ISCHEMIC CARDIOMYOPATHY: ICD-10-CM

## 2022-02-12 DIAGNOSIS — I10 ESSENTIAL HYPERTENSION: ICD-10-CM

## 2022-02-12 DIAGNOSIS — Z98.61 CAD S/P PERCUTANEOUS CORONARY ANGIOPLASTY: ICD-10-CM

## 2022-02-14 NOTE — TELEPHONE ENCOUNTER
E-scribe request for Metoprolol. Please review and e-scribe if applicable. Next Visit Date:  Future Appointments   Date Time Provider Nando Chani   2/15/2022  2:30 PM Corrie Issa MD [facility] IM [id number]   6/9/2022  1:00 PM Kanwal Ballard MD [id number] Maintenance   Topic Date Due    Lipid screen  06/03/2021    Flu vaccine (1) 09/01/2021    Shingles Vaccine (1 of 2) 07/13/2022 (Originally 10/12/2004)    A1C test (Diabetic or Prediabetic)  03/18/2022    Low dose CT lung screening  04/09/2022    PSA counseling  10/07/2022    Depression Screen  12/21/2022    Annual Wellness Visit (AWV)  12/22/2022    Colon cancer screen colonoscopy  01/23/2028    DTaP/Tdap/Td vaccine (2 - Td or Tdap) 09/03/2030    Pneumococcal 65+ years Vaccine  Completed    COVID-19 Vaccine  Completed    AAA screen  Completed    Hepatitis C screen  Completed    Hepatitis A vaccine  Aged Out    Hepatitis B vaccine  Aged Out    Hib vaccine  Aged Out    Meningococcal (ACWY) vaccine  Aged Out               (applicable per patient's age: Cancer Screenings, Depression Screening, Fall Risk Screening, Immunizations)    Hemoglobin A1C (%)   Date Value   03/18/2021 6.2 (H)   06/03/2020 6.3 (H)   06/04/2019 4.7     Microalb/Crt.  Ratio (mcg/mg creat)   Date Value   07/27/2018 CANNOT BE CALCULATED     LDL Cholesterol (mg/dL)   Date Value   06/03/2020 51     AST (U/L)   Date Value   06/03/2020 15     ALT (U/L)   Date Value   06/03/2020 11     BUN (mg/dL)   Date Value   03/18/2021 12      (goal A1C is < 7)   (goal LDL is <100) need 30-50% reduction from baseline     BP Readings from Last 3 Encounters:   12/07/21 117/80   10/11/21 124/86   07/13/21 114/77    (goal /80)      All Future Testing planned in [facility]:  Lab Frequency Next Occurrence   Lipid Panel Once 03/07/2022   CBC With Auto Differential Once 06/07/2022   Iron and TIBC Once 06/07/2022   Ferritin Once 06/07/2022   Transfuse RBC Transfusion             Patient Active Problem List:     HTN (hypertension)     Prostate cancer adenocarcinoma. [place] stage 6 staus post laparoscopic prostatectomy 2009.      History of right  inguinal hernia repair     Stage 3 severe COPD by GOLD classification ([address]     Erectile dysfunction     Hyperlipidemia     CKD (chronic kidney disease)     Prediabetes     History of colon polyps     CAD in native artery     Iron deficiency anemia secondary to inadequate dietary iron intake     Malabsorption

## 2022-02-15 ENCOUNTER — OFFICE VISIT (OUTPATIENT)
Dept: INTERNAL MEDICINE | Age: 67
End: 2022-02-15
Payer: MEDICARE

## 2022-02-15 VITALS
HEART RATE: 75 BPM | BODY MASS INDEX: 21.7 KG/M2 | WEIGHT: 160 LBS | DIASTOLIC BLOOD PRESSURE: 87 MMHG | SYSTOLIC BLOOD PRESSURE: 123 MMHG

## 2022-02-15 DIAGNOSIS — Z13.6 ENCOUNTER FOR ABDOMINAL AORTIC ANEURYSM (AAA) SCREENING: ICD-10-CM

## 2022-02-15 DIAGNOSIS — N18.31 STAGE 3A CHRONIC KIDNEY DISEASE (HCC): ICD-10-CM

## 2022-02-15 DIAGNOSIS — D50.8 IRON DEFICIENCY ANEMIA SECONDARY TO INADEQUATE DIETARY IRON INTAKE: ICD-10-CM

## 2022-02-15 DIAGNOSIS — I10 ESSENTIAL HYPERTENSION: Primary | ICD-10-CM

## 2022-02-15 DIAGNOSIS — I25.10 CAD S/P PERCUTANEOUS CORONARY ANGIOPLASTY: ICD-10-CM

## 2022-02-15 DIAGNOSIS — Z98.61 CAD S/P PERCUTANEOUS CORONARY ANGIOPLASTY: ICD-10-CM

## 2022-02-15 DIAGNOSIS — R73.03 PREDIABETES: ICD-10-CM

## 2022-02-15 DIAGNOSIS — K90.89 OTHER SPECIFIED INTESTINAL MALABSORPTION: ICD-10-CM

## 2022-02-15 DIAGNOSIS — C61 PROSTATE CANCER (HCC): ICD-10-CM

## 2022-02-15 DIAGNOSIS — Z23 NEEDS FLU SHOT: ICD-10-CM

## 2022-02-15 DIAGNOSIS — I70.213 INTERMITTENT CLAUDICATION OF BOTH LOWER EXTREMITIES DUE TO ATHEROSCLEROSIS (HCC): ICD-10-CM

## 2022-02-15 DIAGNOSIS — J44.9 STAGE 3 SEVERE COPD BY GOLD CLASSIFICATION (HCC): ICD-10-CM

## 2022-02-15 LAB — HBA1C MFR BLD: 6.1 %

## 2022-02-15 PROCEDURE — 90686 IIV4 VACC NO PRSV 0.5 ML IM: CPT | Performed by: INTERNAL MEDICINE

## 2022-02-15 PROCEDURE — G8482 FLU IMMUNIZE ORDER/ADMIN: HCPCS | Performed by: INTERNAL MEDICINE

## 2022-02-15 PROCEDURE — 83036 HEMOGLOBIN GLYCOSYLATED A1C: CPT | Performed by: INTERNAL MEDICINE

## 2022-02-15 PROCEDURE — G8420 CALC BMI NORM PARAMETERS: HCPCS | Performed by: INTERNAL MEDICINE

## 2022-02-15 PROCEDURE — 99211 OFF/OP EST MAY X REQ PHY/QHP: CPT | Performed by: INTERNAL MEDICINE

## 2022-02-15 PROCEDURE — 4040F PNEUMOC VAC/ADMIN/RCVD: CPT | Performed by: INTERNAL MEDICINE

## 2022-02-15 PROCEDURE — G8427 DOCREV CUR MEDS BY ELIG CLIN: HCPCS | Performed by: INTERNAL MEDICINE

## 2022-02-15 PROCEDURE — 99214 OFFICE O/P EST MOD 30 MIN: CPT | Performed by: INTERNAL MEDICINE

## 2022-02-15 PROCEDURE — 3017F COLORECTAL CA SCREEN DOC REV: CPT | Performed by: INTERNAL MEDICINE

## 2022-02-15 PROCEDURE — 3023F SPIROM DOC REV: CPT | Performed by: INTERNAL MEDICINE

## 2022-02-15 PROCEDURE — 1036F TOBACCO NON-USER: CPT | Performed by: INTERNAL MEDICINE

## 2022-02-15 PROCEDURE — 1123F ACP DISCUSS/DSCN MKR DOCD: CPT | Performed by: INTERNAL MEDICINE

## 2022-02-15 RX ORDER — FLUTICASONE FUROATE, UMECLIDINIUM BROMIDE AND VILANTEROL TRIFENATATE 100; 62.5; 25 UG/1; UG/1; UG/1
POWDER RESPIRATORY (INHALATION)
Qty: 60 EACH | Refills: 5 | Status: SHIPPED | OUTPATIENT
Start: 2022-02-15 | End: 2022-08-22

## 2022-02-15 RX ORDER — ALBUTEROL SULFATE 90 UG/1
2 AEROSOL, METERED RESPIRATORY (INHALATION) EVERY 6 HOURS PRN
Qty: 1 EACH | Refills: 5 | Status: SHIPPED | OUTPATIENT
Start: 2022-02-15

## 2022-02-15 RX ORDER — AMLODIPINE BESYLATE 5 MG/1
5 TABLET ORAL DAILY
Qty: 90 TABLET | Refills: 0 | Status: SHIPPED | OUTPATIENT
Start: 2022-02-15 | End: 2022-05-18 | Stop reason: SDUPTHER

## 2022-02-15 RX ORDER — PANTOPRAZOLE SODIUM 40 MG/1
40 TABLET, DELAYED RELEASE ORAL DAILY
Qty: 90 TABLET | Refills: 3 | Status: SHIPPED | OUTPATIENT
Start: 2022-02-15 | End: 2022-08-09 | Stop reason: SDUPTHER

## 2022-02-15 RX ORDER — ISOSORBIDE MONONITRATE 30 MG/1
30 TABLET, EXTENDED RELEASE ORAL DAILY
Qty: 90 TABLET | Refills: 3 | Status: SHIPPED | OUTPATIENT
Start: 2022-02-15 | End: 2022-08-09 | Stop reason: SDUPTHER

## 2022-02-15 ASSESSMENT — ENCOUNTER SYMPTOMS
SHORTNESS OF BREATH: 0
COUGH: 0
CONSTIPATION: 1
WHEEZING: 0
BLOOD IN STOOL: 0
ABDOMINAL PAIN: 0
PHOTOPHOBIA: 0

## 2022-02-15 NOTE — PATIENT INSTRUCTIONS
-Pt due for 6 month f/u in August-- pt to call in July to set up an appt--reminder in [facility] to contact patient as well--AVS given to patient    -Bloodwork orders given to patient, they will have them done before their next visit.     -Your doctor has ordered a PFT for you, please contact our scheduling department at [phone] to set up an appointment to have this completed before your next visit.     -Wyatt Norwood

## 2022-02-15 NOTE — PROGRESS NOTES
[facility]    Progress Note    Date of patient's visit: 2/15/2022    Patient's Name:  Arthur Eddy    YOB: 1955            Patient Care Team:  Werner Langley MD as PCP - General (Internal Medicine)  Werner Langley MD as PCP - [facility] Empaneled Provider  LINO Hanson - CNP as Nurse Practitioner (Cardiology)  Jessica Becker RN as   Patsy Lyons MD as Consulting Physician (Gastroenterology)    REASON FOR VISIT: Routine outpatient follow     Chief Complaint   Patient presents with    Hypertension    Health Maintenance     lipid reprinted, flu given          HISTORY OF PRESENT ILLNESS:    History was obtained from the patient. Arthur Eddy is a 78 y.o. is here for follow-up. Overall he is doing well. He is compliant with medications. He is an ex-smoker. He has CAD and severe COPD. He cannot remember when he last saw his cardiologist but he thinks he is due for follow-up. He denies any chest pain. No worsening of shortness of breath. He denies any wheezing. He had as a chest CT last year. He uses his inhaler and he thinks it helps. His main limitation to walking is his lack claudications. He says he can walk around the block and that has to stop or sit down. ABIs last year were negative. We will repeat it again. He has a history of prostate CA. He follows up with urology once a year. He had  Prostatectomy done and has had some erectile dysfunction since then. He continues to follow-up with hematology because of his history of iron deficiency anemia secondary to AVMs and GI bleed. He is on Protonix. He is maintained on Plavix and aspirin because of his bare-metal stent and CAD. Advised to follow-up with cardiology regarding recommendations for aspirin and Plavix. Labs are due.       Chest CT 2021  Impression   Grossly stable tiny pulmonary nodules dating back to 2018.  These are   primarily seen within the right middle lobe.  Given the over 2 years of   stability, a benign process is favored.  No new or enlarging suspicious   pulmonary nodule is seen. PFT's 2019    IMPRESSION:  This pulmonary function test is consistent with severe  obstructive ventilatory impairment with significant response to  bronchodilator suggestive of concomitant bronchospasticity. Lung volume  is consistent with airway trapping or hyperinflation. There is severe  reduction in diffusion capacity, which is likely secondary to emphysema. Clinical correlation is recommended.     PVR 2021  Conclusions        Summary        Normal PVR at rest of the bilateral lower extremity.    Normal arterial PPG waveforms of the bilateral digits.          Past Medical History:   Diagnosis Date    Alcohol withdrawal seizure ([address] 1991    quit ETOH since 1991    Blood loss anemia 2008    transfusion from chronic plavix and asa use    CAD (coronary artery disease) 2006    angioplast with drug eluting stent to l circumflex     Cardiomyopathy ([address]     Chest pain     Chronic kidney disease     CKD (chronic kidney disease) 8/9/2016    COPD (chronic obstructive pulmonary disease) ([address] 08/2012    severe obstructive disease with bronchospasm on PFT    Cough     \"smoker\"    Erectile dysfunction     History of transfusion of packed red blood cells 2009    s/p prostate surgery    Hyperlipidemia     Hypertension     Nicotine dependence     Primary adenocarcinoma of prostate ([address] 2009    tucker stage 6 s/p lap prostatectomy    Urinary incontinence     Wears dentures     upper and lower    Wears glasses        Past Surgical History:   Procedure Laterality Date    CARDIAC CATHETERIZATION  2006    drug eluting stent to LCX STENT X1    CARDIAC CATHETERIZATION  10/2015    STENT WAS PATENT    CARDIAC CATHETERIZATION  10/03/2018    STENT X1    COLONOSCOPY  2008    int hemorrhoids, tiny sigmoid polyp    COLONOSCOPY  09/19/2017    Normal    COLONOSCOPY 1/23/2018    COLONOSCOPY WITH BIOPSY performed by Jody Alvarez MD at [facility]    ENDOSCOPY, COLON, DIAGNOSTIC      INGUINAL HERNIA REPAIR Right     AR COLON CA SCRN NOT  [address]  IND N/A 9/19/2017    COLONOSCOPY performed by Markus Ortega MD at [facility] ESOPHAGOGASTRODUODENOSCOPY TRANSORAL DIAGNOSTIC N/A 9/19/2017    EGD ESOPHAGOGASTRODUODENOSCOPY performed by Markus Ortega MD at [address] [place] [address]  2009    adenocarcinoma tucker stage 6    UPPER GASTROINTESTINAL ENDOSCOPY  2008    normal    UPPER GASTROINTESTINAL ENDOSCOPY  09/19/2017    The cardia, fundus, incisura, antrum and pylorus were identified via direct visualization. The endoscopic exam showed 6 small to medium AVMs. Hemostasis was achieved by BiCap cautery. Two hemoclips were placed.       UPPER GASTROINTESTINAL ENDOSCOPY  1/23/2018    EGD BIOPSY performed by Jody Alvarez MD at [facility]         ALLERGIES      Allergies   Allergen Reactions    Phenytoin Sodium Extended Rash       MEDICATIONS:      Current Outpatient Medications on File Prior to Visit   Medication Sig Dispense Refill    metoprolol tartrate (LOPRESSOR) 25 MG tablet TAKE 1 TABLET BY MOUTH TWICE DAILY 180 tablet 1    TRELEGY ELLIPTA 100-62.5-25 MCG/INH AEPB INHALE 1 PUFF INTO THE LUNGS DAILY 60 each 5    isosorbide mononitrate (IMDUR) 30 MG extended release tablet TAKE 1 TABLET BY MOUTH DAILY 90 tablet 3    atorvastatin (LIPITOR) 40 MG tablet TAKE 1 TABLET BY MOUTH DAILY 90 tablet 1    clopidogrel (PLAVIX) 75 MG tablet TAKE 1 TABLET BY MOUTH DAILY 90 tablet 1    amLODIPine (NORVASC) 5 MG tablet TAKE 1 TABLET BY MOUTH DAILY 90 tablet 0    docusate sodium (STOOL SOFTENER) 100 MG capsule TAKE 1 CAPSULE BY MOUTH TWICE DAILY 60 capsule 5    pantoprazole (PROTONIX) 40 MG tablet TAKE 1 TABLET BY MOUTH DAILY 90 tablet 3    albuterol sulfate HFA (PROAIR HFA) 108 (90 Base) MCG/ACT inhaler Inhale 2 puffs into the lungs every 6 hours as needed for Wheezing 1 Inhaler 3    nitroGLYCERIN (NITROSTAT) 0.3 MG SL tablet DISSOLVE 1 TABLET UNDER THE TONGUE EVERY 5 MINUTES AS NEEDED FOR CHEST PAIN UNTIL RELIEF IS OBTAINED, IF PAIN PERSISTS, CALL 911 100 tablet 0    ranolazine (RANEXA) 500 MG extended release tablet Take 500 mg by mouth 2 times daily      aspirin 81 MG chewable tablet Take 1 tablet by mouth daily 30 tablet 3     No current facility-administered medications on file prior to visit. HISTORY    Reviewed and no change from previous record. Vin Espinal  reports that he quit smoking about 3 years ago. His smoking use included cigarettes. He has a 25.00 pack-year smoking history. He has never used smokeless tobacco.    FAMILY HISTORY:    Reviewed and No change from previous visit    HEALTH MAINTENANCE DUE:      Health Maintenance Due   Topic Date Due    Lipid screen  06/03/2021    Flu vaccine (1) 09/01/2021       REVIEW OF SYSTEMS:    12 point review of symptoms completed and found to be normal except noted in the HPI    Review of Systems   Constitutional: Negative for chills, fatigue and fever. Eyes: Negative for photophobia and visual disturbance. Respiratory: Negative for cough, shortness of breath and wheezing. Cardiovascular: Negative for chest pain, palpitations and leg swelling. Leg claudication   Gastrointestinal: Positive for constipation. Negative for abdominal pain and blood in stool. Endocrine: Negative for polydipsia and polyuria. Genitourinary: Positive for urgency. Negative for enuresis. Neurological: Negative for dizziness, syncope, weakness and headaches. Hematological: Negative for adenopathy. Does not bruise/bleed easily. PHYSICAL EXAM:     Vitals:    02/15/22 1421 02/15/22 1427   BP:  123/87   Site:  Left Upper Arm   Position:  Sitting   Cuff Size:  Small Adult   Pulse:  75   Weight: 160 lb (72.6 kg)      Body mass index is 21.7 kg/m².     BP Readings from Last 3 Encounters:   02/15/22 123/87   12/07/21 117/80 10/11/21 124/86        Wt Readings from Last 3 Encounters:   02/15/22 160 lb (72.6 kg)   12/07/21 162 lb 6.4 oz (73.7 kg)   10/11/21 157 lb (71.2 kg)       Physical Exam  Vitals and nursing note reviewed. Constitutional:       Appearance: Normal appearance. HENT:      Head: Normocephalic and atraumatic. Eyes:      General: No scleral icterus. Extraocular Movements: Extraocular movements intact. Conjunctiva/sclera: Conjunctivae normal.      Pupils: Pupils are equal, round, and reactive to light. Cardiovascular:      Rate and Rhythm: Normal rate and regular rhythm. Heart sounds: No murmur heard. Pulmonary:      Effort: Pulmonary effort is normal.      Breath sounds: No wheezing or rales. Comments: Distant breath sounds b/l  Musculoskeletal:         General: Normal range of motion. Cervical back: Normal range of motion and neck supple. Right lower leg: No edema. Left lower leg: No edema. Skin:     General: Skin is warm and dry. Neurological:      General: No focal deficit present. Mental Status: He is alert and oriented to person, place, and time.                LABORATORY FINDINGS:    CBC:  Lab Results   Component Value Date    WBC 5.8 10/26/2021    HGB 13.6 10/26/2021     10/26/2021     BMP:    Lab Results   Component Value Date     03/18/2021    K 4.2 03/18/2021     03/18/2021    CO2 25 03/18/2021    BUN 12 03/18/2021    CREATININE 1.31 03/18/2021    GLUCOSE 96 03/18/2021     HEMOGLOBIN A1C:   Lab Results   Component Value Date    LABA1C 6.1 02/15/2022     MICROALBUMIN URINE:   Lab Results   Component Value Date    MICROALBUR <12 07/27/2018     FASTING LIPID PANEL:  Lab Results   Component Value Date    CHOL 104 06/03/2020    HDL 36 (L) 06/03/2020    TRIG 85 06/03/2020     Lab Results   Component Value Date    LDLCHOLESTEROL 51 06/03/2020       LIVER PROFILE:  Lab Results   Component Value Date    ALT 11 06/03/2020    AST 15 06/03/2020 PROT 7.1 06/03/2020    BILITOT 0.40 06/03/2020    BILIDIR <0.08 07/26/2016    LABALBU 4.2 06/03/2020      THYROID FUNCTION:   Lab Results   Component Value Date    TSH 1.53 10/25/2018      URINEANALYSIS: No results found for: LABURIN  ASSESSMENT AND PLAN:    1. Essential hypertension    - amLODIPine (NORVASC) 5 MG tablet; Take 1 tablet by mouth daily  Dispense: 90 tablet; Refill: 0  - Comprehensive Metabolic Panel; Future    2. Prediabetes    - POCT glycosylated hemoglobin (Hb A1C)  - Comprehensive Metabolic Panel; Future    3. Stage 3 severe COPD by GOLD classification ([facility]    - fluticasone-umeclidin-vilant (TRELEGY ELLIPTA) 100-62.5-25 MCG/INH AEPB; INHALE 1 PUFF INTO THE LUNGS DAILY  Dispense: 60 each; Refill: 5  - albuterol sulfate HFA (PROAIR HFA) 108 (90 Base) MCG/ACT inhaler; Inhale 2 puffs into the lungs every 6 hours as needed for Wheezing  Dispense: 1 each; Refill: 5  - Full PFT Study With Bronchodilator; Future    4. CAD S/P percutaneous coronary angioplasty    - isosorbide mononitrate (IMDUR) 30 MG extended release tablet; Take 1 tablet by mouth daily  Dispense: 90 tablet; Refill: 3  - Comprehensive Metabolic Panel; Future  - Lipid Panel; Future    5. Intermittent claudication of both lower extremities due to atherosclerosis (HCC)    - VL HELIO BILATERAL LIMITED 1-2 LEVELS; Future    6. Stage 3a chronic kidney disease (HCC)  CMP     7. Prostate cancer adenocarcinoma. [place] stage 6 staus post laparoscopic prostatectomy 2009. Follow up with urology    8. Iron deficiency anemia secondary to inadequate dietary iron intake    - pantoprazole (PROTONIX) 40 MG tablet; Take 1 tablet by mouth daily  Dispense: 90 tablet; Refill: 3    9. Needs flu shot    - INFLUENZA, QUADV, 3 YRS AND OLDER, IM PF, PREFILL SYR OR SDV, 0.5ML (AFLURIA QUADV, PF)  - ME ADMIN INFLUENZA VIRUS VAC    10. Other specified intestinal malabsorption    - pantoprazole (PROTONIX) 40 MG tablet;  Take 1 tablet by mouth daily  Dispense: 90 tablet; Refill: 3    11. Encounter for abdominal aortic aneurysm (AAA) screening    - US SCREENING FOR AAA; Future          FOLLOW UP AND INSTRUCTIONS:   Return in about 6 months (around 8/15/2022). 1. Alicia Enriquez received counseling on the following healthy behaviors: nutrition, exercise and medication adherence    2. Reviewed prior labs and health maintenance. 3. Discussed use, benefit, and side effects of prescribed medications. Barriers to medication compliance addressed. All patient questions answered. Pt voiced understanding.      4. Patient given educational materials - see patient instructions    Kelsi Rodriguez  Attending Physician, [address] [place] [address], Internal Medicine Residency Program  [address] [place] [address]  2/15/2022, 2:39 PM

## 2022-02-22 ENCOUNTER — HOSPITAL ENCOUNTER (OUTPATIENT)
Age: 67
Setting detail: SPECIMEN
Discharge: HOME | End: 2022-02-22

## 2022-02-22 DIAGNOSIS — Z98.61 CAD S/P PERCUTANEOUS CORONARY ANGIOPLASTY: ICD-10-CM

## 2022-02-22 DIAGNOSIS — I25.10 CAD S/P PERCUTANEOUS CORONARY ANGIOPLASTY: ICD-10-CM

## 2022-02-22 DIAGNOSIS — R73.03 PREDIABETES: ICD-10-CM

## 2022-02-22 DIAGNOSIS — I10 ESSENTIAL HYPERTENSION: ICD-10-CM

## 2022-02-22 LAB
ALBUMIN SERPL-MCNC: 4.5 G/DL (ref 3.5–5.2)
ALBUMIN/GLOBULIN RATIO: 1.6 (ref 1–2.5)
ALP BLD-CCNC: 91 U/L (ref 40–129)
ALT SERPL-CCNC: 17 U/L (ref 5–41)
ANION GAP SERPL CALCULATED.3IONS-SCNC: 16 MMOL/L (ref 9–17)
AST SERPL-CCNC: 17 U/L
BILIRUB SERPL-MCNC: 0.37 MG/DL (ref 0.3–1.2)
BUN BLDV-MCNC: 10 MG/DL (ref 8–23)
CALCIUM SERPL-MCNC: 9.4 MG/DL (ref 8.6–10.4)
CHLORIDE BLD-SCNC: 101 MMOL/L (ref 98–107)
CHOLESTEROL/HDL RATIO: 3.8
CHOLESTEROL: 141 MG/DL
CO2: 25 MMOL/L (ref 20–31)
CREAT SERPL-MCNC: 1.37 MG/DL (ref 0.7–1.2)
GFR AFRICAN AMERICAN: >60 ML/MIN
GFR NON-AFRICAN AMERICAN: 52 ML/MIN
GFR SERPL CREATININE-BSD FRML MDRD: ABNORMAL ML/MIN/{1.73_M2}
GLUCOSE BLD-MCNC: 99 MG/DL (ref 70–99)
HDLC SERPL-MCNC: 37 MG/DL
LDL CHOLESTEROL: 70 MG/DL (ref 0–130)
POTASSIUM SERPL-SCNC: 4.4 MMOL/L (ref 3.7–5.3)
SODIUM BLD-SCNC: 142 MMOL/L (ref 135–144)
TOTAL PROTEIN: 7.4 G/DL (ref 6.4–8.3)
TRIGL SERPL-MCNC: 172 MG/DL

## 2022-02-23 ENCOUNTER — HOSPITAL ENCOUNTER (OUTPATIENT)
Dept: VASCULAR LAB | Age: 67
Discharge: HOME | End: 2022-02-23
Payer: MEDICARE

## 2022-02-23 DIAGNOSIS — Z13.6 ENCOUNTER FOR ABDOMINAL AORTIC ANEURYSM (AAA) SCREENING: ICD-10-CM

## 2022-02-23 PROCEDURE — 76706 US ABDL AORTA SCREEN AAA: CPT

## 2022-02-23 PROCEDURE — 93922 UPR/L XTREMITY ART 2 LEVELS: CPT

## 2022-03-01 ENCOUNTER — HOSPITAL ENCOUNTER (OUTPATIENT)
Dept: PULMONOLOGY | Age: 67
Discharge: HOME | End: 2022-03-01
Payer: MEDICARE

## 2022-03-01 DIAGNOSIS — J44.9 STAGE 3 SEVERE COPD BY GOLD CLASSIFICATION (HCC): ICD-10-CM

## 2022-03-01 PROCEDURE — 94640 AIRWAY INHALATION TREATMENT: CPT

## 2022-03-01 PROCEDURE — 94729 DIFFUSING CAPACITY: CPT

## 2022-03-01 PROCEDURE — 94726 PLETHYSMOGRAPHY LUNG VOLUMES: CPT

## 2022-03-01 PROCEDURE — 94664 DEMO&/EVAL PT USE INHALER: CPT

## 2022-03-01 PROCEDURE — 94060 EVALUATION OF WHEEZING: CPT

## 2022-03-11 RX ORDER — DOCUSATE SODIUM 100 MG/1
CAPSULE, LIQUID FILLED ORAL
Qty: 60 CAPSULE | Refills: 5 | Status: SHIPPED | OUTPATIENT
Start: 2022-03-11 | End: 2022-08-09 | Stop reason: SDUPTHER

## 2022-03-11 NOTE — TELEPHONE ENCOUNTER
Request for Stool Softener. Next Visit Date:  Future Appointments   Date Time Provider Nando Chani   6/9/2022  1:00 PM Michael Cannon MD [id number] [facility] Maintenance   Topic Date Due    Shingles Vaccine (1 of 2) 07/13/2022 (Originally 10/12/2004)    Low dose CT lung screening  04/09/2022    PSA counseling  10/07/2022    Depression Screen  12/21/2022    Annual Wellness Visit (AWV)  12/22/2022    A1C test (Diabetic or Prediabetic)  02/15/2023    Lipid screen  02/22/2027    Colorectal Cancer Screen  01/23/2028    DTaP/Tdap/Td vaccine (2 - Td or Tdap) 09/03/2030    Flu vaccine  Completed    Pneumococcal 65+ years Vaccine  Completed    COVID-19 Vaccine  Completed    AAA screen  Completed    Hepatitis C screen  Completed    Hepatitis A vaccine  Aged Out    Hepatitis B vaccine  Aged Out    Hib vaccine  Aged Out    Meningococcal (ACWY) vaccine  Aged Out       Hemoglobin A1C (%)   Date Value   02/15/2022 6.1   03/18/2021 6.2 (H)   06/03/2020 6.3 (H)             ( goal A1C is < 7)   Microalb/Crt. Ratio (mcg/mg creat)   Date Value   07/27/2018 CANNOT BE CALCULATED     LDL Cholesterol (mg/dL)   Date Value   02/22/2022 70       (goal LDL is <100)   AST (U/L)   Date Value   02/22/2022 17     ALT (U/L)   Date Value   02/22/2022 17     BUN (mg/dL)   Date Value   02/22/2022 10     BP Readings from Last 3 Encounters:   02/15/22 123/87   12/07/21 117/80   10/11/21 124/86          (goal 120/80)    All Future Testing planned in [facility]  Lab Frequency Next Occurrence   Lipid Panel Once 03/07/2022   CBC With Auto Differential Once 06/07/2022   Iron and TIBC Once 06/07/2022   Ferritin Once 06/07/2022   VL HELIO BILATERAL LIMITED 1-2 LEVELS Once 05/02/2022   Transfuse RBC Transfusion          Patient Active Problem List:     HTN (hypertension)     Prostate cancer adenocarcinoma. [place] stage 6 staus post laparoscopic prostatectomy 2009.      History of right  inguinal hernia repair     Stage 3 severe COPD by GOLD classification ([facility] [address] [facility]     Erectile dysfunction     Hyperlipidemia     CKD (chronic kidney disease)     Prediabetes     History of colon polyps     CAD in native artery     Iron deficiency anemia secondary to inadequate dietary iron intake     Malabsorption

## 2022-03-30 NOTE — PROCEDURES
[address] [facility]                               PULMONARY FUNCTION    PATIENT NAME: Lashay Dillard                       :        1955  MED REC NO:   [id number]                             ROOM:  ACCOUNT NO:   [de-identified]                           ADMIT DATE: 2022  PROVIDER:     Jesica Castillo    DATE OF PROCEDURE:  2022    The patient's spirometry shows a severe obstructive defect. FEV1 is 40%  predicted with 11% bronchodilator change to 45% predicted, FVC 68%  predicted with 10% bronchodilator change to 75% predicted. FEV1 to FVC  ratio 45, post-bronchodilator 46. Total lung capacity by body box 113% predicted and RV is 220% predicted,  consistent with hyperinflation. Diffusion capacity uncorrected 52% predicted. Corrected for alveolar  volume 85% predicted with increase in airway resistance. FINAL IMPRESSION:  The study shows stage III COPD with a bronchodilator  response that does not meet ATS criteria, but a clinical trial is  recommended; there is hyperinflation and moderate to severely decreased  diffusion capacity likely due to emphysema. Clinical correlation  advised.         Valeri Ng    D: 2022 16:20:21       T: 2022 16:22:32     [email or web address]  Job#: [id number]     Doc#: [id number]    CC:

## 2022-05-17 DIAGNOSIS — I10 ESSENTIAL HYPERTENSION: ICD-10-CM

## 2022-05-17 DIAGNOSIS — Z98.61 CAD S/P PERCUTANEOUS CORONARY ANGIOPLASTY: ICD-10-CM

## 2022-05-17 DIAGNOSIS — I25.10 CAD S/P PERCUTANEOUS CORONARY ANGIOPLASTY: ICD-10-CM

## 2022-05-17 NOTE — TELEPHONE ENCOUNTER
Request for medication refill, clopidogrel and amlodipine      Next Visit Date:pt on wait list till 8/15/22, last seen 2/15/22  Future Appointments   Date Time Provider Nando Stallings   6/9/2022  1:00 PM Keena George MD [id number] [facility] Maintenance   Topic Date Due    Low dose CT lung screening  06/29/2018    Shingles vaccine (1 of 2) 07/13/2022 (Originally 10/12/2004)    Prostate Specific Antigen (PSA) Screening or Monitoring  10/07/2022    Depression Screen  12/21/2022    Annual Wellness Visit (AWV)  12/22/2022    A1C test (Diabetic or Prediabetic)  02/15/2023    Lipids  02/22/2027    Colorectal Cancer Screen  01/23/2028    DTaP/Tdap/Td vaccine (2 - Td or Tdap) 09/03/2030    Flu vaccine  Completed    Pneumococcal 65+ years Vaccine  Completed    COVID-19 Vaccine  Completed    AAA screen  Completed    Hepatitis C screen  Completed    Hepatitis A vaccine  Aged Out    Hepatitis B vaccine  Aged Out    Hib vaccine  Aged Out    Meningococcal (ACWY) vaccine  Aged Out       Hemoglobin A1C (%)   Date Value   02/15/2022 6.1   03/18/2021 6.2 (H)   06/03/2020 6.3 (H)             ( goal A1C is < 7)   Microalb/Crt. Ratio (mcg/mg creat)   Date Value   07/27/2018 CANNOT BE CALCULATED     LDL Cholesterol (mg/dL)   Date Value   02/22/2022 70       (goal LDL is <100)   AST (U/L)   Date Value   02/22/2022 17     ALT (U/L)   Date Value   02/22/2022 17     BUN (mg/dL)   Date Value   02/22/2022 10     BP Readings from Last 3 Encounters:   02/15/22 123/87   12/07/21 117/80   10/11/21 124/86          (goal 120/80)    All Future Testing planned in [facility]  Lab Frequency Next Occurrence   CBC With Auto Differential Once 06/07/2022   Iron and TIBC Once 06/07/2022   Ferritin Once 06/07/2022   VL HELIO BILATERAL LIMITED 1-2 LEVELS Once 05/02/2022   Transfuse RBC Transfusion          Patient Active Problem List:     HTN (hypertension)     Prostate cancer adenocarcinoma.  [place] stage 6 staus post laparoscopic prostatectomy 2009.      History of right  inguinal hernia repair     Stage 3 severe COPD by GOLD classification ([address]     Erectile dysfunction     Hyperlipidemia     CKD (chronic kidney disease)     Prediabetes     History of colon polyps     CAD in native artery     Iron deficiency anemia secondary to inadequate dietary iron intake     Malabsorption

## 2022-05-18 RX ORDER — AMLODIPINE BESYLATE 5 MG/1
5 TABLET ORAL DAILY
Qty: 90 TABLET | Refills: 0 | Status: SHIPPED | OUTPATIENT
Start: 2022-05-18 | End: 2022-08-09 | Stop reason: SDUPTHER

## 2022-05-18 RX ORDER — CLOPIDOGREL BISULFATE 75 MG/1
75 TABLET ORAL DAILY
Qty: 90 TABLET | Refills: 1 | Status: SHIPPED | OUTPATIENT
Start: 2022-05-18

## 2022-06-09 ENCOUNTER — OFFICE VISIT (OUTPATIENT)
Dept: ONCOLOGY | Age: 67
End: 2022-06-09
Payer: MEDICARE

## 2022-06-09 ENCOUNTER — HOSPITAL ENCOUNTER (OUTPATIENT)
Facility: MEDICAL CENTER | Age: 67
End: 2022-06-09

## 2022-06-09 ENCOUNTER — TELEPHONE (OUTPATIENT)
Dept: ONCOLOGY | Age: 67
End: 2022-06-09

## 2022-06-09 ENCOUNTER — HOSPITAL ENCOUNTER (OUTPATIENT)
Age: 67
Discharge: HOME | End: 2022-06-09
Payer: MEDICARE

## 2022-06-09 VITALS
TEMPERATURE: 97.9 F | BODY MASS INDEX: 22.09 KG/M2 | DIASTOLIC BLOOD PRESSURE: 87 MMHG | WEIGHT: 162.9 LBS | SYSTOLIC BLOOD PRESSURE: 124 MMHG | RESPIRATION RATE: 16 BRPM | HEART RATE: 69 BPM

## 2022-06-09 DIAGNOSIS — D50.8 IRON DEFICIENCY ANEMIA SECONDARY TO INADEQUATE DIETARY IRON INTAKE: ICD-10-CM

## 2022-06-09 DIAGNOSIS — D50.8 IRON DEFICIENCY ANEMIA SECONDARY TO INADEQUATE DIETARY IRON INTAKE: Primary | ICD-10-CM

## 2022-06-09 LAB
ABSOLUTE EOS #: 0.35 K/UL (ref 0–0.44)
ABSOLUTE IMMATURE GRANULOCYTE: 0.05 K/UL (ref 0–0.3)
ABSOLUTE LYMPH #: 1.58 K/UL (ref 1.1–3.7)
ABSOLUTE MONO #: 0.55 K/UL (ref 0.1–1.2)
BASOPHILS # BLD: 1 % (ref 0–2)
BASOPHILS ABSOLUTE: 0.04 K/UL (ref 0–0.2)
EOSINOPHILS RELATIVE PERCENT: 6 % (ref 1–4)
FERRITIN: 61 NG/ML (ref 30–400)
HCT VFR BLD CALC: 42.6 % (ref 40.7–50.3)
HEMOGLOBIN: 13.4 G/DL (ref 13–17)
IMMATURE GRANULOCYTES: 1 %
IRON SATURATION: 29 % (ref 20–55)
IRON: 78 UG/DL (ref 59–158)
LYMPHOCYTES # BLD: 29 % (ref 24–43)
MCH RBC QN AUTO: 29.3 PG (ref 25.2–33.5)
MCHC RBC AUTO-ENTMCNC: 31.5 G/DL (ref 28.4–34.8)
MCV RBC AUTO: 93 FL (ref 82.6–102.9)
MONOCYTES # BLD: 10 % (ref 3–12)
NRBC AUTOMATED: 0 PER 100 WBC
PDW BLD-RTO: 13.3 % (ref 11.8–14.4)
PLATELET # BLD: 270 K/UL (ref 138–453)
PMV BLD AUTO: 9.3 FL (ref 8.1–13.5)
RBC # BLD: 4.58 M/UL (ref 4.21–5.77)
SEG NEUTROPHILS: 53 % (ref 36–65)
SEGMENTED NEUTROPHILS ABSOLUTE COUNT: 2.95 K/UL (ref 1.5–8.1)
TOTAL IRON BINDING CAPACITY: 273 UG/DL (ref 250–450)
UNSATURATED IRON BINDING CAPACITY: 195 UG/DL (ref 112–347)
WBC # BLD: 5.5 K/UL (ref 3.5–11.3)

## 2022-06-09 PROCEDURE — 99214 OFFICE O/P EST MOD 30 MIN: CPT | Performed by: INTERNAL MEDICINE

## 2022-06-09 PROCEDURE — 36415 COLL VENOUS BLD VENIPUNCTURE: CPT

## 2022-06-09 PROCEDURE — 82728 ASSAY OF FERRITIN: CPT

## 2022-06-09 PROCEDURE — 99211 OFF/OP EST MAY X REQ PHY/QHP: CPT

## 2022-06-09 PROCEDURE — 83540 ASSAY OF IRON: CPT

## 2022-06-09 PROCEDURE — G8427 DOCREV CUR MEDS BY ELIG CLIN: HCPCS | Performed by: INTERNAL MEDICINE

## 2022-06-09 PROCEDURE — G8420 CALC BMI NORM PARAMETERS: HCPCS | Performed by: INTERNAL MEDICINE

## 2022-06-09 PROCEDURE — 83550 IRON BINDING TEST: CPT

## 2022-06-09 PROCEDURE — 3017F COLORECTAL CA SCREEN DOC REV: CPT | Performed by: INTERNAL MEDICINE

## 2022-06-09 PROCEDURE — 1036F TOBACCO NON-USER: CPT | Performed by: INTERNAL MEDICINE

## 2022-06-09 PROCEDURE — 99211 OFF/OP EST MAY X REQ PHY/QHP: CPT | Performed by: INTERNAL MEDICINE

## 2022-06-09 PROCEDURE — 1123F ACP DISCUSS/DSCN MKR DOCD: CPT | Performed by: INTERNAL MEDICINE

## 2022-06-09 PROCEDURE — 85025 COMPLETE CBC W/AUTO DIFF WBC: CPT

## 2022-06-09 RX ORDER — ATORVASTATIN CALCIUM 40 MG/1
TABLET, FILM COATED ORAL
COMMUNITY
Start: 2022-03-09 | End: 2022-06-14 | Stop reason: SDUPTHER

## 2022-06-09 NOTE — TELEPHONE ENCOUNTER
[facility] ARRIVES AMBULATORY FOR MD VISIT  DR Lazarus Paez IN TO SEE PATIENT  ORDERS RECEIVED  RV 12 MONTHS WITH LABS  LABS CDP FE TIBC FERRITIN DUE June 2023, ORDERS WILL BE PLACED WHEN F/U IS MADE  MD VISIT DUE June 2023, AVS PRINTED AND PLACED INTO CALL FOLDER TO SCHEDULE F/U  AVS PRINTED AND GIVEN TO PATIENT WITH INSTRUCTIONS  PATIENT DISCHARGED AMBULATORY

## 2022-06-12 NOTE — PROGRESS NOTES
_        Chief Complaint   Patient presents with    Follow-up    Discuss Labs        DIAGNOSIS:        Iron deficiency anemia secondary to chronic GI blood loss  Status post GI bleeding  Gastric AV malformation     CURRENT THERAPY:         Status post blood transfusion in January 2018  Status post IV iron infusion in January 2018. April 2019. BRIEF CASE HISTORY:      Mr. Adair Ley is a very pleasant 78 y.o. male with history of multiple comorbidities as listed. The patient was hospitalized in January 2018 because of extensive weakness and fatigue when he was found to have severe anemia. He had blood transfusion and he had GI workup which showed negative colonoscopy however he had gastric AV malformation which was cauterized. Patient received IV iron during hospitalization and he is maintained on oral iron at the present time. He feels much better. He has mild weakness. No dizziness. Patient has dark stool secondary to oral iron but no hematochezia. No melena or hematemesis. No palpitation. No other complaints. He had GI blood loss in January 2018. He had blood transfusion on iron infusion. INTERIM HISTORY:   The patient was seen for follow-up anemia. He is maintained on oral iron. Tolerated well. No GI bleeding. No melena or hematochezia. No hematemesis. No abdominal pain. He is maintained on aspirin. No clear evidence of active bleeding.      PAST MEDICAL HISTORY: has a past medical history of Alcohol withdrawal seizure ([address] Blood loss anemia, CAD (coronary artery disease), Cardiomyopathy ([address] Chest pain, Chronic kidney disease, CKD (chronic kidney disease), COPD (chronic obstructive pulmonary disease) ([address] Cough, Erectile dysfunction, History of transfusion of packed red blood cells, Hyperlipidemia, Hypertension, Nicotine dependence, Primary adenocarcinoma of prostate ([address] Urinary incontinence, Wears dentures, and Wears glasses. PAST SURGICAL HISTORY: has a past surgical history that includes Cardiac catheterization (2006); Endoscopy, colon, diagnostic; Upper gastrointestinal endoscopy (2008); Prostatectomy (2009); Cardiac catheterization (10/2015); Colonoscopy (2008); Inguinal hernia repair (Right); pr colon ca scrn not hi rsk ind (N/A, 9/19/2017); pr esophagogastroduodenoscopy transoral diagnostic (N/A, 9/19/2017); Upper gastrointestinal endoscopy (09/19/2017); Colonoscopy (09/19/2017); Colonoscopy (1/23/2018); Upper gastrointestinal endoscopy (1/23/2018); and Cardiac catheterization (10/03/2018). CURRENT MEDICATIONS:  has a current medication list which includes the following prescription(s): atorvastatin, amlodipine, clopidogrel, docusate sodium, trelegy ellipta, isosorbide mononitrate, pantoprazole, albuterol sulfate hfa, metoprolol tartrate, ranolazine, aspirin, and nitroglycerin. ALLERGIES:  is allergic to phenytoin sodium extended. FAMILY HISTORY: Negative for any hematological or oncological conditions. SOCIAL HISTORY:  reports that he quit smoking about 3 years ago. His smoking use included cigarettes. He has a 25.00 pack-year smoking history. He has never used smokeless tobacco. He reports previous drug use. Frequency: 1.00 time per week. Drug: Marijuana Darliss Meeter). He reports that he does not drink alcohol. REVIEW OF SYSTEMS:     · General: No weakness or fatigue. No unanticipated weight loss or decreased appetite. No fever or chills. · Eyes: No blurred vision, eye pain or double vision. · Ears: No hearing problems or drainage. No tinnitus. · Throat: No sore throat, problems with swallowing or dysphagia. · Respiratory: No cough, sputum or hemoptysis. No shortness of breath. No pleuritic chest pain. · Cardiovascular: No chest pain, orthopnea or PND. No lower extremity edema. No palpitation. · Gastrointestinal: As above.     · Genitourinary: No dysuria, hematuria, frequency or urgency. · Musculoskeletal: No muscle aches or pains. No limitation of movement. No back pain. No gait disturbance, No joint complaints. · Dermatologic: No skin rashes or pruritus. No skin lesions or discolorations. · Psychiatric: No depression, anxiety, or stress or signs of schizophrenia. No change in mood or affect. · Hematologic: No history of bleeding tendency. No bruises or ecchymosis. No history of clotting problems. · Infectious disease: No fever, chills or frequent infections. · Endocrine: No problems with opacity. No polydipsia or polyuria. No temperature intolerance. · Neurologic: No headaches or dizziness. No weakness or numbness of the extremities. No changes in balance, coordination,  memory, mentation, behavior. · Allergic/Immunologic: No nasal congestion or hives. No repeated infections. PHYSICAL EXAM:  The patient is not in acute distress. Vital signs: Blood pressure 124/87, pulse 69, temperature 97.9 °F (36.6 °C), temperature source Temporal, resp. rate 16, weight 162 lb 14.4 oz (73.9 kg). HEENT:  Eyes are normal. Ears, nose and throat are normal.  Neck: Supple. No lymph node enlargement. No thyroid enlargement. Trachea is centrally located. Chest:  Clear to auscultation. No wheezes or crepitations. Heart: Regular sinus rhythm. Abdomen: Soft, nontender. No hepatosplenomegaly. No masses. Extremities:  With no edema. Lymph Nodes:  No cervical, axillary or inguinal lymph node enlargement. Neurologic:  Conscious and oriented. No focal neurological deficits. Psychosocial: No depression, anxiety or stress. Skin: No rashes, bruises or ecchymoses.       Review of Diagnostic data:   Recent Labs     06/09/22  1134   WBC 5.5   HGB 13.4   HCT 42.6   MCV 93.0        Lab Results   Component Value Date    IRON 78 06/09/2022    TIBC 273 06/09/2022    FERRITIN 61 06/09/2022         IMPRESSION:   Iron deficiency anemia secondary to chronic GI blood loss  Status post GI bleeding  Gastric AV malformation  Status post cardiac cath and coronary stents placement. PLAN: I reviewed the labs as above and discussed with the patient. I explained to the patient the nature of this hematologic problem. Patient had severe iron deficiency anemia related to GI blood loss in January 2018. He had excellent response to IV iron infusion and he had blood transfusion that time. Repeated labs showed stable disease. No clear evidence of bleeding. We will monitor closely. Will continue Protonix. Patient will continue to take aspirin and Plavix. Status post recent cardiac stent placement. He will also take Protonix. Labs will be repeated in 6 months. Sooner if he develops any problems. He will have continued monitoring since it is possible for him to have repeated episodes of bleeding from AV malformation. Patient understands and agrees. Patient's questions were answered to the best of his satisfaction and he verbalized full understanding and agreement.

## 2022-06-14 RX ORDER — ATORVASTATIN CALCIUM 40 MG/1
TABLET, FILM COATED ORAL
Qty: 30 TABLET | Refills: 2 | Status: SHIPPED | OUTPATIENT
Start: 2022-06-14 | End: 2022-08-09 | Stop reason: SDUPTHER

## 2022-06-14 NOTE — TELEPHONE ENCOUNTER
Request for medication refill, atorvastatin. Next Visit Date:pt on wait list til 8/15/22, last seen 2/15/22  No future appointments. Health Maintenance   Topic Date Due    Low dose CT lung screening  06/29/2018    Shingles vaccine (1 of 2) 07/13/2022 (Originally 10/12/2004)    Prostate Specific Antigen (PSA) Screening or Monitoring  10/07/2022    Depression Screen  12/21/2022    Annual Wellness Visit (AWV)  12/22/2022    A1C test (Diabetic or Prediabetic)  02/15/2023    Lipids  02/22/2023    Colorectal Cancer Screen  01/23/2028    DTaP/Tdap/Td vaccine (2 - Td or Tdap) 09/03/2030    Flu vaccine  Completed    Pneumococcal 65+ years Vaccine  Completed    COVID-19 Vaccine  Completed    AAA screen  Completed    Hepatitis C screen  Completed    Hepatitis A vaccine  Aged Out    Hepatitis B vaccine  Aged Out    Hib vaccine  Aged Out    Meningococcal (ACWY) vaccine  Aged Out       Hemoglobin A1C (%)   Date Value   02/15/2022 6.1   03/18/2021 6.2 (H)   06/03/2020 6.3 (H)             ( goal A1C is < 7)   Microalb/Crt. Ratio (mcg/mg creat)   Date Value   07/27/2018 CANNOT BE CALCULATED     LDL Cholesterol (mg/dL)   Date Value   02/22/2022 70       (goal LDL is <100)   AST (U/L)   Date Value   02/22/2022 17     ALT (U/L)   Date Value   02/22/2022 17     BUN (mg/dL)   Date Value   02/22/2022 10     BP Readings from Last 3 Encounters:   06/09/22 124/87   02/15/22 123/87   12/07/21 117/80          (goal 120/80)    All Future Testing planned in [facility]  Lab Frequency Next Occurrence   VL HELIO BILATERAL LIMITED 1-2 LEVELS Once 05/02/2022   Transfuse RBC Transfusion          Patient Active Problem List:     HTN (hypertension)     Prostate cancer adenocarcinoma. [place] stage 6 staus post laparoscopic prostatectomy 2009.      History of right  inguinal hernia repair     Stage 3 severe COPD by GOLD classification ([facility] [address]     Erectile dysfunction     Hyperlipidemia     CKD (chronic kidney disease)     Prediabetes

## 2022-08-08 DIAGNOSIS — I25.10 CAD S/P PERCUTANEOUS CORONARY ANGIOPLASTY: ICD-10-CM

## 2022-08-08 DIAGNOSIS — Z98.61 CAD S/P PERCUTANEOUS CORONARY ANGIOPLASTY: ICD-10-CM

## 2022-08-08 DIAGNOSIS — I25.5 ISCHEMIC CARDIOMYOPATHY: ICD-10-CM

## 2022-08-08 DIAGNOSIS — I10 ESSENTIAL HYPERTENSION: ICD-10-CM

## 2022-08-08 NOTE — TELEPHONE ENCOUNTER
Request for Lopressor. Next Visit Date:  Future Appointments   Date Time Provider Nando Stallings   8/9/2022  3:30 PM Lino Garner  [address] Maintenance   Topic Date Due    Shingles vaccine (1 of 2) Never done    Low dose CT lung screening  06/29/2018    Flu vaccine (1) 09/01/2022    Prostate Specific Antigen (PSA) Screening or Monitoring  10/07/2022    Depression Screen  12/21/2022    Annual Wellness Visit (AWV)  12/22/2022    A1C test (Diabetic or Prediabetic)  02/15/2023    Lipids  02/22/2023    Colorectal Cancer Screen  01/23/2028    DTaP/Tdap/Td vaccine (2 - Td or Tdap) 09/03/2030    Pneumococcal 65+ years Vaccine  Completed    COVID-19 Vaccine  Completed    AAA screen  Completed    Hepatitis C screen  Completed    Hepatitis A vaccine  Aged Out    Hepatitis B vaccine  Aged Out    Hib vaccine  Aged Out    Meningococcal (ACWY) vaccine  Aged Out       Hemoglobin A1C (%)   Date Value   02/15/2022 6.1   03/18/2021 6.2 (H)   06/03/2020 6.3 (H)             ( goal A1C is < 7)   Microalb/Crt. Ratio (mcg/mg creat)   Date Value   07/27/2018 CANNOT BE CALCULATED     LDL Cholesterol (mg/dL)   Date Value   02/22/2022 70       (goal LDL is <100)   AST (U/L)   Date Value   02/22/2022 17     ALT (U/L)   Date Value   02/22/2022 17     BUN (mg/dL)   Date Value   02/22/2022 10     BP Readings from Last 3 Encounters:   06/09/22 124/87   02/15/22 123/87   12/07/21 117/80          (goal 120/80)    All Future Testing planned in [facility]  Lab Frequency Next Occurrence   VL HELIO BILATERAL LIMITED 1-2 LEVELS Once 05/02/2022   Transfuse RBC Transfusion          Patient Active Problem List:     HTN (hypertension)     Prostate cancer adenocarcinoma. [place] stage 6 staus post laparoscopic prostatectomy 2009.      History of right  inguinal hernia repair     Stage 3 severe COPD by GOLD classification ([address]     Erectile dysfunction     Hyperlipidemia     CKD (chronic kidney disease)     Prediabetes     History of colon polyps     CAD in native artery     Iron deficiency anemia secondary to inadequate dietary iron intake     Malabsorption

## 2022-08-09 ENCOUNTER — TELEMEDICINE (OUTPATIENT)
Dept: INTERNAL MEDICINE | Age: 67
End: 2022-08-09
Payer: MEDICARE

## 2022-08-09 DIAGNOSIS — I10 ESSENTIAL HYPERTENSION: Primary | ICD-10-CM

## 2022-08-09 DIAGNOSIS — J44.9 STAGE 3 SEVERE COPD BY GOLD CLASSIFICATION (HCC): ICD-10-CM

## 2022-08-09 DIAGNOSIS — I50.22 CHRONIC SYSTOLIC (CONGESTIVE) HEART FAILURE (HCC): ICD-10-CM

## 2022-08-09 DIAGNOSIS — D50.8 IRON DEFICIENCY ANEMIA SECONDARY TO INADEQUATE DIETARY IRON INTAKE: ICD-10-CM

## 2022-08-09 DIAGNOSIS — I25.10 CAD S/P PERCUTANEOUS CORONARY ANGIOPLASTY: ICD-10-CM

## 2022-08-09 DIAGNOSIS — Z98.61 CAD S/P PERCUTANEOUS CORONARY ANGIOPLASTY: ICD-10-CM

## 2022-08-09 DIAGNOSIS — K90.89 OTHER SPECIFIED INTESTINAL MALABSORPTION: ICD-10-CM

## 2022-08-09 DIAGNOSIS — Z87.891 PERSONAL HISTORY OF TOBACCO USE: ICD-10-CM

## 2022-08-09 DIAGNOSIS — N18.30 STAGE 3 CHRONIC KIDNEY DISEASE, UNSPECIFIED WHETHER STAGE 3A OR 3B CKD (HCC): ICD-10-CM

## 2022-08-09 PROCEDURE — G0296 VISIT TO DETERM LDCT ELIG: HCPCS | Performed by: INTERNAL MEDICINE

## 2022-08-09 PROCEDURE — 99442 PR PHYS/QHP TELEPHONE EVALUATION 11-20 MIN: CPT | Performed by: INTERNAL MEDICINE

## 2022-08-09 RX ORDER — ISOSORBIDE MONONITRATE 30 MG/1
30 TABLET, EXTENDED RELEASE ORAL DAILY
Qty: 90 TABLET | Refills: 2 | Status: SHIPPED | OUTPATIENT
Start: 2022-08-09

## 2022-08-09 RX ORDER — PANTOPRAZOLE SODIUM 40 MG/1
40 TABLET, DELAYED RELEASE ORAL DAILY
Qty: 90 TABLET | Refills: 0 | Status: SHIPPED | OUTPATIENT
Start: 2022-08-09

## 2022-08-09 RX ORDER — DOCUSATE SODIUM 100 MG/1
CAPSULE, LIQUID FILLED ORAL
Qty: 60 CAPSULE | Refills: 5 | Status: SHIPPED | OUTPATIENT
Start: 2022-08-09

## 2022-08-09 RX ORDER — AMLODIPINE BESYLATE 5 MG/1
5 TABLET ORAL DAILY
Qty: 90 TABLET | Refills: 2 | Status: SHIPPED | OUTPATIENT
Start: 2022-08-09

## 2022-08-09 RX ORDER — ATORVASTATIN CALCIUM 40 MG/1
TABLET, FILM COATED ORAL
Qty: 90 TABLET | Refills: 2 | Status: SHIPPED | OUTPATIENT
Start: 2022-08-09 | End: 2022-08-26

## 2022-08-09 SDOH — ECONOMIC STABILITY: FOOD INSECURITY: WITHIN THE PAST 12 MONTHS, THE FOOD YOU BOUGHT JUST DIDN'T LAST AND YOU DIDN'T HAVE MONEY TO GET MORE.: NEVER TRUE

## 2022-08-09 SDOH — ECONOMIC STABILITY: FOOD INSECURITY: WITHIN THE PAST 12 MONTHS, YOU WORRIED THAT YOUR FOOD WOULD RUN OUT BEFORE YOU GOT MONEY TO BUY MORE.: NEVER TRUE

## 2022-08-09 ASSESSMENT — PATIENT HEALTH QUESTIONNAIRE - PHQ9
SUM OF ALL RESPONSES TO PHQ QUESTIONS 1-9: 1
SUM OF ALL RESPONSES TO PHQ9 QUESTIONS 1 & 2: 1
2. FEELING DOWN, DEPRESSED OR HOPELESS: 1
SUM OF ALL RESPONSES TO PHQ QUESTIONS 1-9: 1
SUM OF ALL RESPONSES TO PHQ QUESTIONS 1-9: 1
1. LITTLE INTEREST OR PLEASURE IN DOING THINGS: 0
SUM OF ALL RESPONSES TO PHQ QUESTIONS 1-9: 1

## 2022-08-09 ASSESSMENT — SOCIAL DETERMINANTS OF HEALTH (SDOH): HOW HARD IS IT FOR YOU TO PAY FOR THE VERY BASICS LIKE FOOD, HOUSING, MEDICAL CARE, AND HEATING?: NOT HARD AT ALL

## 2022-08-09 NOTE — PROGRESS NOTES
Lucy Franco (:  1955) is a Established patient, here for evaluation of the following:    Assessment & Plan   Below is the assessment and plan developed based on review of pertinent history, physical exam, labs, studies, and medications. 1. Essential hypertension  -     amLODIPine (NORVASC) 5 MG tablet; Take 1 tablet by mouth in the morning., Disp-90 tablet, R-2Normal  -     Basic Metabolic Panel; Future  2. CAD S/P percutaneous coronary angioplasty  -     atorvastatin (LIPITOR) 40 MG tablet; TAKE 1 TABLET BY MOUTH DAILY, Disp-90 tablet, R-2Normal  -     isosorbide mononitrate (IMDUR) 30 MG extended release tablet; Take 1 tablet by mouth in the morning., Disp-90 tablet, R-2Normal  3. Stage 3 severe COPD by GOLD classification ([facility] [address]  -     Basic Metabolic Panel; Future  4. Stage 3 chronic kidney disease, unspecified whether stage 3a or 3b CKD ([facility] [address]  5. Chronic systolic (congestive) heart failure  6. Iron deficiency anemia secondary to inadequate dietary iron intake  -     pantoprazole (PROTONIX) 40 MG tablet; Take 1 tablet by mouth in the morning., Disp-90 tablet, R-0Normal  7. Other specified intestinal malabsorption  -     pantoprazole (PROTONIX) 40 MG tablet; Take 1 tablet by mouth in the morning., Disp-90 tablet, R-0Normal  8. Personal history of tobacco use  -     CO VISIT TO DISCUSS LUNG CA SCREEN W LDCT  -     CT Lung Screen (Annual); Future    No follow-ups on file. Subjective   HPI  Patient initiated a phone visit to discuss chronic conditions and to get medication refills. Overall he is doing well. He is denying shortness of breath or chest pain. No leg edema. He quit smoking approximately 5 years ago. He has a 25-year pack history of smoking. He is due for a low-dose CT of his lungs. Labs done in the last few months discussed. Its been stable. He has been following up with hematology. No blood in his stools. CBC is much improved with iron infusions.   PFT shows stage III COPD.    PFT 2022    FINAL IMPRESSION:  The study shows stage III COPD with a bronchodilator  response that does not meet ATS criteria, but a clinical trial is  recommended; there is hyperinflation and moderate to severely decreased  diffusion capacity likely due to emphysema. Clinical correlation  advised. HELIO 2022      Conclusions        Summary        Right HELIO of 1.08 and left HELIO of 1.06 are within normal limits. AAA screen  Conclusions        Summary        No evidence of aneurysmal dilatation of the aorta and common iliac    arteries bilaterally. Review of Systems   Constitutional: Negative for chills, fatigue and fever. Eyes: Negative for photophobia and visual disturbance. Respiratory: Negative for cough, shortness of breath and wheezing. Cardiovascular: Negative for chest pain, palpitations and leg swelling. Leg claudication   Gastrointestinal: Positive for constipation. Negative for abdominal pain and blood in stool. Endocrine: Negative for polydipsia and polyuria. Genitourinary: Positive for urgency. Negative for enuresis. Neurological: Negative for dizziness, syncope, weakness and headaches. Hematological: Negative for adenopathy. Does not bruise/bleed easily. Objective   Patient-Reported Vitals  Patient-Reported Systolic (Top): 145 mmHg  Patient-Reported Diastolic (Bottom): 89 mmHg       Physical Exam         On this date 8/9/2022 I have spent 20 minutes reviewing previous notes, test results and face to face (virtual) with the patient discussing the diagnosis and importance of compliance with the treatment plan as well as documenting on the day of the visit. Sharad Hale, was evaluated through a synchronous (real-time) audio-video encounter. The patient (or guardian if applicable) is aware that this is a billable service, which includes applicable co-pays. This Virtual Visit was conducted with patient's (and/or legal guardian's) consent.  The visit was

## 2022-08-11 ENCOUNTER — TELEPHONE (OUTPATIENT)
Dept: ONCOLOGY | Age: 67
End: 2022-08-11

## 2022-08-19 ENCOUNTER — HOSPITAL ENCOUNTER (OUTPATIENT)
Age: 67
Setting detail: SPECIMEN
Discharge: HOME | End: 2022-08-19

## 2022-08-19 ENCOUNTER — HOSPITAL ENCOUNTER (OUTPATIENT)
Dept: CT IMAGING | Age: 67
Discharge: HOME | End: 2022-08-21
Payer: MEDICARE

## 2022-08-19 DIAGNOSIS — J44.9 STAGE 3 SEVERE COPD BY GOLD CLASSIFICATION (HCC): ICD-10-CM

## 2022-08-19 DIAGNOSIS — Z87.891 PERSONAL HISTORY OF TOBACCO USE: ICD-10-CM

## 2022-08-19 DIAGNOSIS — I10 ESSENTIAL HYPERTENSION: ICD-10-CM

## 2022-08-19 LAB
ANION GAP SERPL CALCULATED.3IONS-SCNC: 13 MMOL/L (ref 9–17)
BUN BLDV-MCNC: 6 MG/DL (ref 8–23)
CALCIUM SERPL-MCNC: 8.9 MG/DL (ref 8.6–10.4)
CHLORIDE BLD-SCNC: 102 MMOL/L (ref 98–107)
CO2: 26 MMOL/L (ref 20–31)
CREAT SERPL-MCNC: 1.28 MG/DL (ref 0.7–1.2)
GFR AFRICAN AMERICAN: >60 ML/MIN
GFR NON-AFRICAN AMERICAN: 56 ML/MIN
GFR SERPL CREATININE-BSD FRML MDRD: ABNORMAL ML/MIN/{1.73_M2}
GLUCOSE BLD-MCNC: 99 MG/DL (ref 70–99)
POTASSIUM SERPL-SCNC: 3.8 MMOL/L (ref 3.7–5.3)
SODIUM BLD-SCNC: 141 MMOL/L (ref 135–144)

## 2022-08-19 PROCEDURE — 71271 CT THORAX LUNG CANCER SCR C-: CPT

## 2022-08-21 DIAGNOSIS — J44.9 STAGE 3 SEVERE COPD BY GOLD CLASSIFICATION (HCC): ICD-10-CM

## 2022-08-22 DIAGNOSIS — J44.9 STAGE 3 SEVERE COPD BY GOLD CLASSIFICATION (HCC): ICD-10-CM

## 2022-08-22 RX ORDER — FLUTICASONE FUROATE, UMECLIDINIUM BROMIDE AND VILANTEROL TRIFENATATE 100; 62.5; 25 UG/1; UG/1; UG/1
POWDER RESPIRATORY (INHALATION)
Qty: 60 EACH | Refills: 5 | OUTPATIENT
Start: 2022-08-22

## 2022-08-22 RX ORDER — FLUTICASONE FUROATE, UMECLIDINIUM BROMIDE AND VILANTEROL TRIFENATATE 100; 62.5; 25 UG/1; UG/1; UG/1
POWDER RESPIRATORY (INHALATION)
Qty: 60 EACH | Refills: 5 | Status: SHIPPED | OUTPATIENT
Start: 2022-08-22

## 2022-08-26 DIAGNOSIS — I25.10 CAD S/P PERCUTANEOUS CORONARY ANGIOPLASTY: ICD-10-CM

## 2022-08-26 DIAGNOSIS — Z98.61 CAD S/P PERCUTANEOUS CORONARY ANGIOPLASTY: ICD-10-CM

## 2022-08-26 RX ORDER — ATORVASTATIN CALCIUM 40 MG/1
TABLET, FILM COATED ORAL
Qty: 30 TABLET | Refills: 2 | Status: SHIPPED | OUTPATIENT
Start: 2022-08-26

## 2022-08-26 NOTE — TELEPHONE ENCOUNTER
E-scribe request for lipitor. Please review and e-scribe if applicable. Next Visit Date:  Future Appointments   Date Time Provider Nando Chani   11/15/2022  2:45 PM All Thompson MD [address] [facility]   Topic Date Due    Shingles vaccine (1 of 2) 08/09/2023 (Originally 10/12/2004)    Flu vaccine (1) 09/01/2022    Prostate Specific Antigen (PSA) Screening or Monitoring  10/07/2022    Annual Wellness Visit (AWV)  12/22/2022    A1C test (Diabetic or Prediabetic)  02/15/2023    Lipids  02/22/2023    Depression Screen  08/09/2023    Low dose CT lung screening  08/19/2023    Colorectal Cancer Screen  01/23/2028    DTaP/Tdap/Td vaccine (2 - Td or Tdap) 09/03/2030    Pneumococcal 65+ years Vaccine  Completed    COVID-19 Vaccine  Completed    AAA screen  Completed    Hepatitis C screen  Completed    Hepatitis A vaccine  Aged Out    Hepatitis B vaccine  Aged Out    Hib vaccine  Aged Out    Meningococcal (ACWY) vaccine  Aged Out               (applicable per patient's age: Cancer Screenings, Depression Screening, Fall Risk Screening, Immunizations)    Hemoglobin A1C (%)   Date Value   02/15/2022 6.1   03/18/2021 6.2 (H)   06/03/2020 6.3 (H)     Microalb/Crt. Ratio (mcg/mg creat)   Date Value   07/27/2018 CANNOT BE CALCULATED     LDL Cholesterol (mg/dL)   Date Value   02/22/2022 70     AST (U/L)   Date Value   02/22/2022 17     ALT (U/L)   Date Value   02/22/2022 17     BUN (mg/dL)   Date Value   08/19/2022 6 (L)      (goal A1C is < 7)   (goal LDL is <100) need 30-50% reduction from baseline     BP Readings from Last 3 Encounters:   06/09/22 124/87   02/15/22 123/87   12/07/21 117/80    (goal /80)      All Future Testing planned in [facility]:  Lab Frequency Next Occurrence   VL HELIO BILATERAL LIMITED 1-2 LEVELS Once 05/02/2022   Transfuse RBC Transfusion             Patient Active Problem List:     HTN (hypertension)     Prostate cancer adenocarcinoma.  [place] stage 6 staus post laparoscopic prostatectomy 2009.      History of right  inguinal hernia repair     Stage 3 severe COPD by GOLD classification ([address]     Erectile dysfunction     Hyperlipidemia     CKD (chronic kidney disease)     Prediabetes     History of colon polyps     CAD in native artery     Iron deficiency anemia secondary to inadequate dietary iron intake     Malabsorption     Chronic renal disease, stage III (HCC) [275157]     Chronic systolic (congestive) heart failure

## 2022-11-15 ENCOUNTER — TELEMEDICINE (OUTPATIENT)
Dept: INTERNAL MEDICINE | Age: 67
End: 2022-11-15
Payer: MEDICARE

## 2022-11-15 DIAGNOSIS — I25.10 CAD S/P PERCUTANEOUS CORONARY ANGIOPLASTY: ICD-10-CM

## 2022-11-15 DIAGNOSIS — I10 ESSENTIAL HYPERTENSION: Primary | ICD-10-CM

## 2022-11-15 DIAGNOSIS — I50.22 CHRONIC SYSTOLIC (CONGESTIVE) HEART FAILURE (HCC): ICD-10-CM

## 2022-11-15 DIAGNOSIS — J44.9 STAGE 3 SEVERE COPD BY GOLD CLASSIFICATION (HCC): ICD-10-CM

## 2022-11-15 DIAGNOSIS — Z98.61 CAD S/P PERCUTANEOUS CORONARY ANGIOPLASTY: ICD-10-CM

## 2022-11-15 DIAGNOSIS — R73.03 PREDIABETES: ICD-10-CM

## 2022-11-15 DIAGNOSIS — N18.30 STAGE 3 CHRONIC KIDNEY DISEASE, UNSPECIFIED WHETHER STAGE 3A OR 3B CKD (HCC): ICD-10-CM

## 2022-11-15 DIAGNOSIS — J06.9 VIRAL URI: ICD-10-CM

## 2022-11-15 PROCEDURE — 99442 PR PHYS/QHP TELEPHONE EVALUATION 11-20 MIN: CPT | Performed by: INTERNAL MEDICINE

## 2022-11-15 RX ORDER — ALBUTEROL SULFATE 90 UG/1
2 AEROSOL, METERED RESPIRATORY (INHALATION) EVERY 6 HOURS PRN
Qty: 1 EACH | Refills: 5 | Status: SHIPPED | OUTPATIENT
Start: 2022-11-15

## 2022-11-15 RX ORDER — ATORVASTATIN CALCIUM 40 MG/1
TABLET, FILM COATED ORAL
Qty: 30 TABLET | Refills: 5 | Status: SHIPPED | OUTPATIENT
Start: 2022-11-15

## 2022-11-15 ASSESSMENT — ENCOUNTER SYMPTOMS
COUGH: 1
SINUS PAIN: 0
WHEEZING: 0
SINUS PRESSURE: 1
RHINORRHEA: 1
SHORTNESS OF BREATH: 0
SORE THROAT: 0

## 2022-11-15 ASSESSMENT — PATIENT HEALTH QUESTIONNAIRE - PHQ9
2. FEELING DOWN, DEPRESSED OR HOPELESS: 1
SUM OF ALL RESPONSES TO PHQ QUESTIONS 1-9: 1
SUM OF ALL RESPONSES TO PHQ QUESTIONS 1-9: 1
SUM OF ALL RESPONSES TO PHQ9 QUESTIONS 1 & 2: 1
1. LITTLE INTEREST OR PLEASURE IN DOING THINGS: 0
SUM OF ALL RESPONSES TO PHQ QUESTIONS 1-9: 1
SUM OF ALL RESPONSES TO PHQ QUESTIONS 1-9: 1

## 2022-11-15 NOTE — PROGRESS NOTES
Isaiah Partida (:  1955) is a Established patient, here for evaluation of the following:    Assessment & Plan   Below is the assessment and plan developed based on review of pertinent history, physical exam, labs, studies, and medications. 1. Essential hypertension  2. Viral URI  3. Stage 3 chronic kidney disease, unspecified whether stage 3a or 3b CKD ([facility] [address]  4. Prediabetes  5. CAD S/P percutaneous coronary angioplasty  -     atorvastatin (LIPITOR) 40 MG tablet; 1 tablet daily, Disp-30 tablet, R-5Normal  6. Chronic systolic (congestive) heart failure  7. Stage 3 severe COPD by GOLD classification ([facility])  -     albuterol sulfate HFA (PROAIR HFA) 108 (90 Base) MCG/ACT inhaler; Inhale 2 puffs into the lungs every 6 hours as needed for Wheezing, Disp-1 each, R-5Normal  Return in about 3 months (around 2/15/2023). Subjective   HPI  Patient initiated a phone visit to discuss his chronic complaints. He is doing overall well except he has had cold-like symptoms for the last few days. He is having runny nose with postnasal drip and mild cough. Initially was having some headaches and sinus pressure but that has resolved with taking DayQuil or NyQuil. No fever or chills. He has not had any worsening of his COPD and has not had to use albuterol inhaler more than usual.  He is an ex-smoker. Low-dose CT recently was done and reviewed with him. He will continue with yearly CAT scans. He follows up with cardiology and pulmonology. He denies any recent chest pain or shortness of breath or leg edema. Labs reviewed with him. Patient has prediabetes. He will need an A1c done next year in February. He is due for flu and COVID booster. Advised to take flu and COVID booster shots after his viral infection is clear. LDCT   Impression   3 mm nodules involving the right middle lobe. LUNG RADS:   Per ACR Lung-RADS Version 1.1       Category 2, Benign appearance or behavior.        Management:  Continue annual lung screening with LDCT in 12 months. PFT's 2022     FINAL IMPRESSION:  The study shows stage III COPD with a bronchodilator  response that does not meet ATS criteria, but a clinical trial is  recommended; there is hyperinflation and moderate to severely decreased  diffusion capacity likely due to emphysema. Clinical correlation  advised. Review of Systems   Constitutional:  Negative for chills and fever. HENT:  Positive for congestion, rhinorrhea, sinus pressure and sneezing. Negative for sinus pain and sore throat. Respiratory:  Positive for cough. Negative for shortness of breath and wheezing. Cardiovascular:  Negative for chest pain, palpitations and leg swelling. Objective   Patient-Reported Vitals  Patient-Reported Systolic (Top): 712 mmHg  Patient-Reported Diastolic (Bottom): 80 mmHg  Patient-Reported Pulse: 80  Patient-Reported Weight: 162 lb  Patient-Reported Height: 6       Physical Exam         On this date 11/15/2022 I have spent 20 minutes reviewing previous notes, test results and face to face (virtual) with the patient discussing the diagnosis and importance of compliance with the treatment plan as well as documenting on the day of the visit. Tony Villa, was evaluated through a synchronous (real-time) audio-video encounter. The patient (or guardian if applicable) is aware that this is a billable service, which includes applicable co-pays. This Virtual Visit was conducted with patient's (and/or legal guardian's) consent. The visit was conducted pursuant to the emergency declaration under the [facility], [address] [place] [address] authority and the [employer] and [employer] General Act. Patient identification was verified, and a caregiver was present when appropriate. The patient was located at Home: [address] [facility], [place] [address].    Provider was located at [facility] ([address] [place] [address] Dept): [address] [place] [address] [place],  [address] [place] [address].         --Dwayne Treviño MD

## 2022-11-30 DIAGNOSIS — Z98.61 CAD S/P PERCUTANEOUS CORONARY ANGIOPLASTY: ICD-10-CM

## 2022-11-30 DIAGNOSIS — I25.10 CAD S/P PERCUTANEOUS CORONARY ANGIOPLASTY: ICD-10-CM

## 2022-12-01 NOTE — TELEPHONE ENCOUNTER
Last visit: 11/15/2022  Last Med refill: 05/18/2022  Does patient have enough medication for 72 hours: Yes    Next Visit Date:  No future appointments. Health Maintenance   Topic Date Due    COVID-19 Vaccine (4 - Booster for Dottie series) 06/22/2022    Flu vaccine (1) 08/01/2022    Prostate Specific Antigen (PSA) Screening or Monitoring  10/07/2022    Annual Wellness Visit (AWV)  12/22/2022    Shingles vaccine (1 of 2) 08/09/2023 (Originally 10/12/2004)    A1C test (Diabetic or Prediabetic)  02/15/2023    Lipids  02/22/2023    Low dose CT lung screening  08/19/2023    Depression Screen  11/15/2023    Colorectal Cancer Screen  01/23/2028    DTaP/Tdap/Td vaccine (2 - Td or Tdap) 09/03/2030    Pneumococcal 65+ years Vaccine  Completed    AAA screen  Completed    Hepatitis C screen  Completed    Hepatitis A vaccine  Aged Out    Hib vaccine  Aged Out    Meningococcal (ACWY) vaccine  Aged Out       Hemoglobin A1C (%)   Date Value   02/15/2022 6.1   03/18/2021 6.2 (H)   06/03/2020 6.3 (H)             ( goal A1C is < 7)   Microalb/Crt. Ratio (mcg/mg creat)   Date Value   07/27/2018 CANNOT BE CALCULATED     LDL Cholesterol (mg/dL)   Date Value   02/22/2022 70   06/03/2020 51       (goal LDL is <100)   AST (U/L)   Date Value   02/22/2022 17     ALT (U/L)   Date Value   02/22/2022 17     BUN (mg/dL)   Date Value   08/19/2022 6 (L)     BP Readings from Last 3 Encounters:   06/09/22 124/87   02/15/22 123/87   12/07/21 117/80          (goal 120/80)    All Future Testing planned in [facility]  Lab Frequency Next Occurrence   VL HELIO BILATERAL LIMITED 1-2 LEVELS Once 05/02/2022   Transfuse RBC Transfusion                Patient Active Problem List:     HTN (hypertension)     Prostate cancer adenocarcinoma. [place] stage 6 staus post laparoscopic prostatectomy 2009.      History of right  inguinal hernia repair     Stage 3 severe COPD by GOLD classification ([address]     Erectile dysfunction     Hyperlipidemia     CKD (chronic kidney disease)     Prediabetes     History of colon polyps     CAD in native artery     Iron deficiency anemia secondary to inadequate dietary iron intake     Malabsorption     Chronic renal disease, stage III (HCC) [247677]     Chronic systolic (congestive) heart failure

## 2022-12-02 RX ORDER — CLOPIDOGREL BISULFATE 75 MG/1
75 TABLET ORAL DAILY
Qty: 90 TABLET | Refills: 1 | Status: SHIPPED | OUTPATIENT
Start: 2022-12-02

## 2022-12-02 NOTE — TELEPHONE ENCOUNTER
Yesterdays note from LPN said he had enough for 72 hours. Please remind patient to call pharmacy or office at least 3-4 days before refill due. Also remind of 48 hour policy.  Thanks

## 2023-01-18 DIAGNOSIS — D50.8 IRON DEFICIENCY ANEMIA SECONDARY TO INADEQUATE DIETARY IRON INTAKE: Primary | ICD-10-CM

## 2023-02-09 DIAGNOSIS — I10 ESSENTIAL HYPERTENSION: ICD-10-CM

## 2023-02-09 DIAGNOSIS — Z98.61 CAD S/P PERCUTANEOUS CORONARY ANGIOPLASTY: ICD-10-CM

## 2023-02-09 DIAGNOSIS — I25.5 ISCHEMIC CARDIOMYOPATHY: ICD-10-CM

## 2023-02-09 DIAGNOSIS — I25.10 CAD S/P PERCUTANEOUS CORONARY ANGIOPLASTY: ICD-10-CM

## 2023-02-09 NOTE — TELEPHONE ENCOUNTER
Patient recently on waitlist for appt. Health Maintenance   Topic Date Due    COVID-19 Vaccine (4 - Booster for Dottie series) 06/22/2022    Flu vaccine (1) 08/01/2022    Prostate Specific Antigen (PSA) Screening or Monitoring  10/07/2022    Annual Wellness Visit (AWV)  12/22/2022    A1C test (Diabetic or Prediabetic)  02/15/2023    Lipids  02/22/2023    Shingles vaccine (1 of 2) 08/09/2023 (Originally 10/12/2004)    GFR test (Diabetes, CKD 3-4, OR last GFR 15-59)  08/19/2023    Low dose CT lung screening  08/19/2023    Depression Screen  11/15/2023    Colorectal Cancer Screen  01/23/2028    DTaP/Tdap/Td vaccine (2 - Td or Tdap) 09/03/2030    Pneumococcal 65+ years Vaccine  Completed    AAA screen  Completed    Hepatitis C screen  Completed    Hepatitis A vaccine  Aged Out    Hib vaccine  Aged Out    Meningococcal (ACWY) vaccine  Aged Out             (applicable per patient's age: Cancer Screenings, Depression Screening, Fall Risk Screening, Immunizations)    Hemoglobin A1C (%)   Date Value   02/15/2022 6.1   03/18/2021 6.2 (H)   06/03/2020 6.3 (H)     Microalb/Crt.  Ratio (mcg/mg creat)   Date Value   07/27/2018 CANNOT BE CALCULATED     LDL Cholesterol (mg/dL)   Date Value   02/22/2022 70     AST (U/L)   Date Value   02/22/2022 17     ALT (U/L)   Date Value   02/22/2022 17     BUN (mg/dL)   Date Value   08/19/2022 6 (L)      (goal A1C is < 7)   (goal LDL is <100) need 30-50% reduction from baseline     BP Readings from Last 3 Encounters:   06/09/22 124/87   02/15/22 123/87   12/07/21 117/80    (goal /80)      All Future Testing planned in [facility]:  Lab Frequency Next Occurrence   VL HELIO BILATERAL LIMITED 1-2 LEVELS Once 05/02/2022   CBC with Auto Differential Once 07/18/2023   Ferritin Once 07/18/2023   Iron and TIBC Once 07/18/2023   Transfuse RBC Transfusion        Next Visit Date:  Future Appointments   Date Time Provider Nando Stallings   6/13/2023  1:30 PM Irineo Alfaro MD [facility]            Patient Active Problem List:     HTN (hypertension)     Prostate cancer adenocarcinoma. [place] stage 6 staus post laparoscopic prostatectomy 2009.      History of right  inguinal hernia repair     Stage 3 severe COPD by GOLD classification ([address]     Erectile dysfunction     Hyperlipidemia     CKD (chronic kidney disease)     Prediabetes     History of colon polyps     CAD in native artery     Iron deficiency anemia secondary to inadequate dietary iron intake     Malabsorption     Chronic renal disease, stage III (HCC) [533203]     Chronic systolic (congestive) heart failure

## 2023-02-23 ENCOUNTER — TELEPHONE (OUTPATIENT)
Dept: INTERNAL MEDICINE | Age: 68
End: 2023-02-23

## 2023-02-23 DIAGNOSIS — J44.9 STAGE 3 SEVERE COPD BY GOLD CLASSIFICATION (HCC): Primary | ICD-10-CM

## 2023-02-23 NOTE — TELEPHONE ENCOUNTER
Pharmacy requesting refill for Trelegy. Unable to pend. Health Maintenance   Topic Date Due    COVID-19 Vaccine (4 - Booster for Dottie series) 06/22/2022    Flu vaccine (1) 08/01/2022    Prostate Specific Antigen (PSA) Screening or Monitoring  10/07/2022    Annual Wellness Visit (AWV)  12/22/2022    A1C test (Diabetic or Prediabetic)  02/15/2023    Lipids  02/22/2023    Shingles vaccine (1 of 2) 08/09/2023 (Originally 10/12/2004)    GFR test (Diabetes, CKD 3-4, OR last GFR 15-59)  08/19/2023    Low dose CT lung screening  08/19/2023    Depression Screen  11/15/2023    Colorectal Cancer Screen  01/23/2028    DTaP/Tdap/Td vaccine (2 - Td or Tdap) 09/03/2030    Pneumococcal 65+ years Vaccine  Completed    AAA screen  Completed    Hepatitis C screen  Completed    Hepatitis A vaccine  Aged Out    Hib vaccine  Aged Out    Meningococcal (ACWY) vaccine  Aged Out             (applicable per patient's age: Cancer Screenings, Depression Screening, Fall Risk Screening, Immunizations)    Hemoglobin A1C (%)   Date Value   02/15/2022 6.1   03/18/2021 6.2 (H)   06/03/2020 6.3 (H)     Microalb/Crt.  Ratio (mcg/mg creat)   Date Value   07/27/2018 CANNOT BE CALCULATED     LDL Cholesterol (mg/dL)   Date Value   02/22/2022 70     AST (U/L)   Date Value   02/22/2022 17     ALT (U/L)   Date Value   02/22/2022 17     BUN (mg/dL)   Date Value   08/19/2022 6 (L)      (goal A1C is < 7)   (goal LDL is <100) need 30-50% reduction from baseline     BP Readings from Last 3 Encounters:   06/09/22 124/87   02/15/22 123/87   12/07/21 117/80    (goal /80)      All Future Testing planned in [facility]:  Lab Frequency Next Occurrence   CBC with Auto Differential Once 07/18/2023   Ferritin Once 07/18/2023   Iron and TIBC Once 07/18/2023   Transfuse RBC Transfusion        Next Visit Date:  Future Appointments   Date Time Provider Nando Stallings   6/13/2023  1:30 PM Chelsey Perera MD [facility]            Patient Active Problem List:     HTN (hypertension)     Prostate cancer adenocarcinoma. [place] stage 6 staus post laparoscopic prostatectomy 2009.      History of right  inguinal hernia repair     Stage 3 severe COPD by GOLD classification ([address]     Erectile dysfunction     Hyperlipidemia     CKD (chronic kidney disease)     Prediabetes     History of colon polyps     CAD in native artery     Iron deficiency anemia secondary to inadequate dietary iron intake     Malabsorption     Chronic renal disease, stage III (HCC) [[id number]]     Chronic systolic (congestive) heart failure

## 2023-02-24 RX ORDER — FLUTICASONE FUROATE, UMECLIDINIUM BROMIDE AND VILANTEROL TRIFENATATE 200; 62.5; 25 UG/1; UG/1; UG/1
1 POWDER RESPIRATORY (INHALATION) DAILY
Qty: 1 EACH | Refills: 5 | Status: SHIPPED | OUTPATIENT
Start: 2023-02-24

## 2023-03-06 DIAGNOSIS — Z98.61 CAD S/P PERCUTANEOUS CORONARY ANGIOPLASTY: ICD-10-CM

## 2023-03-06 DIAGNOSIS — I25.10 CAD S/P PERCUTANEOUS CORONARY ANGIOPLASTY: ICD-10-CM

## 2023-03-06 RX ORDER — ISOSORBIDE MONONITRATE 30 MG/1
30 TABLET, EXTENDED RELEASE ORAL DAILY
Qty: 90 TABLET | Refills: 2 | Status: SHIPPED | OUTPATIENT
Start: 2023-03-06

## 2023-03-06 NOTE — TELEPHONE ENCOUNTER
Health Maintenance   Topic Date Due    COVID-19 Vaccine (4 - Booster for Dottie series) 06/22/2022    Flu vaccine (1) 08/01/2022    Prostate Specific Antigen (PSA) Screening or Monitoring  10/07/2022    Annual Wellness Visit (AWV)  12/22/2022    A1C test (Diabetic or Prediabetic)  02/15/2023    Lipids  02/22/2023    Shingles vaccine (1 of 2) 08/09/2023 (Originally 10/12/2004)    GFR test (Diabetes, CKD 3-4, OR last GFR 15-59)  08/19/2023    Low dose CT lung screening  08/19/2023    Depression Screen  11/15/2023    Colorectal Cancer Screen  01/23/2028    DTaP/Tdap/Td vaccine (2 - Td or Tdap) 09/03/2030    Pneumococcal 65+ years Vaccine  Completed    AAA screen  Completed    Hepatitis C screen  Completed    Hepatitis A vaccine  Aged Out    Hib vaccine  Aged Out    Meningococcal (ACWY) vaccine  Aged Out             (applicable per patient's age: Cancer Screenings, Depression Screening, Fall Risk Screening, Immunizations)    Hemoglobin A1C (%)   Date Value   02/15/2022 6.1   03/18/2021 6.2 (H)   06/03/2020 6.3 (H)     Microalb/Crt.  Ratio (mcg/mg creat)   Date Value   07/27/2018 CANNOT BE CALCULATED     LDL Cholesterol (mg/dL)   Date Value   02/22/2022 70     AST (U/L)   Date Value   02/22/2022 17     ALT (U/L)   Date Value   02/22/2022 17     BUN (mg/dL)   Date Value   08/19/2022 6 (L)      (goal A1C is < 7)   (goal LDL is <100) need 30-50% reduction from baseline     BP Readings from Last 3 Encounters:   06/09/22 124/87   02/15/22 123/87   12/07/21 117/80    (goal /80)      All Future Testing planned in [facility]:  Lab Frequency Next Occurrence   CBC with Auto Differential Once 07/18/2023   Ferritin Once 07/18/2023   Iron and TIBC Once 07/18/2023   Transfuse RBC Transfusion        Next Visit Date:  Future Appointments   Date Time Provider Nando Stallings   6/13/2023  1:30 PM Minesh Varela MD [facility]            Patient Active Problem List:     HTN (hypertension)     Prostate cancer adenocarcinoma. [place] stage 6 staus post laparoscopic prostatectomy 2009.      History of right  inguinal hernia repair     Stage 3 severe COPD by GOLD classification ([facility] [address]     Erectile dysfunction     Hyperlipidemia     CKD (chronic kidney disease)     Prediabetes     History of colon polyps     CAD in native artery     Iron deficiency anemia secondary to inadequate dietary iron intake     Malabsorption     Chronic renal disease, stage III (HCC) [414753]     Chronic systolic (congestive) heart failure

## 2023-03-30 DIAGNOSIS — K90.89 OTHER SPECIFIED INTESTINAL MALABSORPTION: ICD-10-CM

## 2023-03-30 DIAGNOSIS — D50.8 IRON DEFICIENCY ANEMIA SECONDARY TO INADEQUATE DIETARY IRON INTAKE: ICD-10-CM

## 2023-03-30 NOTE — TELEPHONE ENCOUNTER
adenocarcinoma. [place] stage 6 staus post laparoscopic prostatectomy 2009.      History of right  inguinal hernia repair     Stage 3 severe COPD by GOLD classification ([facility] [address]     Erectile dysfunction     Hyperlipidemia     CKD (chronic kidney disease)     Prediabetes     History of colon polyps     CAD in native artery     Iron deficiency anemia secondary to inadequate dietary iron intake     Malabsorption     Chronic renal disease, stage III (HCC) [[id number]]     Chronic systolic (congestive) heart failure

## 2023-04-03 RX ORDER — PANTOPRAZOLE SODIUM 40 MG/1
40 TABLET, DELAYED RELEASE ORAL DAILY
Qty: 90 TABLET | Refills: 0 | Status: SHIPPED | OUTPATIENT
Start: 2023-04-03

## 2023-04-17 ENCOUNTER — APPOINTMENT (OUTPATIENT)
Dept: CT IMAGING | Age: 68
End: 2023-04-17
Payer: MEDICARE

## 2023-04-17 ENCOUNTER — HOSPITAL ENCOUNTER (EMERGENCY)
Age: 68
Discharge: HOME | End: 2023-04-17
Attending: EMERGENCY MEDICINE
Payer: MEDICARE

## 2023-04-17 VITALS
SYSTOLIC BLOOD PRESSURE: 151 MMHG | TEMPERATURE: 98 F | WEIGHT: 160 LBS | RESPIRATION RATE: 20 BRPM | HEIGHT: 72 IN | BODY MASS INDEX: 21.67 KG/M2 | OXYGEN SATURATION: 99 % | HEART RATE: 91 BPM | DIASTOLIC BLOOD PRESSURE: 104 MMHG

## 2023-04-17 DIAGNOSIS — S01.81XA FACIAL LACERATION, INITIAL ENCOUNTER: Primary | ICD-10-CM

## 2023-04-17 PROCEDURE — 72125 CT NECK SPINE W/O DYE: CPT

## 2023-04-17 PROCEDURE — 12052 INTMD RPR FACE/MM 2.6-5.0 CM: CPT

## 2023-04-17 PROCEDURE — 70450 CT HEAD/BRAIN W/O DYE: CPT

## 2023-04-17 PROCEDURE — 99284 EMERGENCY DEPT VISIT MOD MDM: CPT

## 2023-04-17 ASSESSMENT — PAIN SCALES - GENERAL: PAINLEVEL_OUTOF10: 2

## 2023-04-17 ASSESSMENT — ENCOUNTER SYMPTOMS
VOMITING: 0
SORE THROAT: 0
RHINORRHEA: 0
DIARRHEA: 0
SHORTNESS OF BREATH: 0
ABDOMINAL PAIN: 0
NAUSEA: 0
COUGH: 0

## 2023-04-17 ASSESSMENT — PAIN - FUNCTIONAL ASSESSMENT: PAIN_FUNCTIONAL_ASSESSMENT: 0-10

## 2023-04-17 NOTE — DISCHARGE INSTRUCTIONS
Seen in the ER for a laceration on your forehead. While you are here we got CT scans of your head and neck which were normal.  We closed the laceration on your forehead with 7 stitches plus a few on the inside. A little bit of oozing is normal, but if you notice a lot of bleeding, come back to the ER. Come back to ER if you have severe headache, confusion, nausea, vomiting, fevers or chills. Follow-up with your primary care doctor in 4 days for suture removal, or come back to the ER if she is unable to do them on Saturday, 4/22    Dennis Linares!!!    From [facility] Emergency Department    On behalf of the Emergency Department staff at [facility], I would like to thank you for giving [facility] the opportunity to address your health care needs and concerns. We hope that during your visit, our service was delivered in a professional and caring manner. Please keep [facility] in mind as we walk with you down the path to your own personal wellness. Please expect an automated phone call from [phone] so we can ask a few questions about your health and progress. Based on your answers, a clinician may call you back to offer help and instructions. If you notice any concerning symptoms please return to the ER immediately. These can include but are not limited to: fevers, chills, shortness of breath, vomiting, weakness of the extremities, changes in your mental status, numbness, pale extremities, or chest pain.

## 2023-04-17 NOTE — ED PROVIDER NOTES
[address] [facility]     Emergency Department     Faculty Attestation    I performed a history and physical examination of the patient and discussed management with the resident. I reviewed the residents note and agree with the documented findings and plan of care. Any areas of disagreement are noted on the chart. I was personally present for the key portions of any procedures. I have documented in the chart those procedures where I was not present during the key portions. I have reviewed the emergency nurses triage note. I agree with the chief complaint, past medical history, past surgical history, allergies, medications, social and family history as documented unless otherwise noted below. For Physician Assistant/ Nurse Practitioner cases/documentation I have personally evaluated this patient and have completed at least one if not all key elements of the E/M (history, physical exam, and MDM). Additional findings are as noted. Patient arriving with bleeding from forehead. He was eating think something startled him and he hit his left forehead on exam.  Patient is on aspirin and Plavix but no anticoagulation. Denies any loss of consciousness with this. Large laceration left forehead with active bleeding which is controlled with pressure. Normal pupils oral speech. Is able to ambulate from the wheelchair to the bed without difficulty.   Will image, close lack, trauma consult if needed      Critical Care     none    Tomi Chilel MD, Irais Mcknight  Attending Emergency  Physician           Tomi Chilel MD  04/17/23 2013
esophagogastroduodenoscopy transoral diagnostic (N/A, 2017); Upper gastrointestinal endoscopy (2017); Colonoscopy (2017); Colonoscopy (2018); Upper gastrointestinal endoscopy (2018); and Cardiac catheterization (10/03/2018). Social History     Socioeconomic History    Marital status: Single     Spouse name: Not on file    Number of children: Not on file    Years of education: Not on file    Highest education level: Not on file   Occupational History    Not on file   Tobacco Use    Smoking status: Former     Packs/day: 0.50     Years: 50.00     Pack years: 25.00     Types: Cigarettes     Quit date: 10/5/2018     Years since quittin.5    Smokeless tobacco: Never   Vaping Use    Vaping Use: Never used   Substance and Sexual Activity    Alcohol use: No     Alcohol/week: 0.0 standard drinks     Comment: Recovered alcoholic, quit in 9558    Drug use: Not Currently     Frequency: 1.0 times per week     Types: Marijuana Rosemarie Hockey)    Sexual activity: Not on file   Other Topics Concern    Not on file   Social History Narrative    Not on file     Social Determinants of Health     Financial Resource Strain: Low Risk     Difficulty of Paying Living Expenses: Not hard at all   Food Insecurity: No Food Insecurity    Worried About Running Out of Food in the Last Year: Never true    Ran Out of Food in the Last Year: Never true   Transportation Needs: Not on file   Physical Activity: Not on file   Stress: Not on file   Social Connections: Not on file   Intimate Partner Violence: Not on file   Housing Stability: Not on file       Family History   Problem Relation Age of Onset    High Blood Pressure Mother     Heart Disease Mother         CHF    Substance Abuse Father         etoh cirrhosis       Allergies:  Phenytoin sodium extended    Home Medications:  Prior to Admission medications    Medication Sig Start Date End Date Taking?  Authorizing Provider   pantoprazole (PROTONIX) 40 MG tablet Take 1 tablet
[place]-occipital junction appears symmetric. DEGENERATIVE CHANGES: Multifocal degenerative changes throughout the cervical spine. SOFT TISSUES: No prevertebral soft tissue swelling. No acute osseous abnormality of the cervical spine. RECENT VITALS:     Temp: 98 °F (36.7 °C),  Heart Rate: 91, Resp: 20, BP: (!) 151/104, SpO2: 99 %    This patient is a 75 y.o.  Male with Hit head when woke up on nightstand, laceration that will need repair, ct imaging pendin    OUTSTANDING TASKS / RECOMMENDATIONS:    Ct imaging  Laceration repair      Luci Barnett DO, DO  Attending Emergency Physician  [address] [facility]       Sanjay Chase DO  04/17/23 6066

## 2023-04-17 NOTE — ED NOTES
Pt arrives to room 13 from triage per hospital W/C with c/o uncontrolled bleeding to head laceration. Pt reports he was sleeping \"and I must have been dreaming about something and picked my head up off the bed\" and hit it against the corner of his nightstand. Pt reports trying to control bleeding with towels prior to arrival, but was unable to get the bleeding to stop. Pt takes Eliquis and ASA daily.       Lizbet Helms RN  04/17/23 6123

## 2023-04-17 NOTE — ED NOTES
Dr. Erman Koyanagi at bedside. Pressure dressing placed to forehead laceration. Bleeding controlled at this time.       Sadie Mills RN  04/17/23 1997

## 2023-04-25 ENCOUNTER — HOSPITAL ENCOUNTER (EMERGENCY)
Age: 68
Discharge: HOME | End: 2023-04-25
Attending: EMERGENCY MEDICINE
Payer: MEDICARE

## 2023-04-25 VITALS
DIASTOLIC BLOOD PRESSURE: 97 MMHG | HEART RATE: 82 BPM | SYSTOLIC BLOOD PRESSURE: 142 MMHG | RESPIRATION RATE: 18 BRPM | OXYGEN SATURATION: 99 % | TEMPERATURE: 97.4 F

## 2023-04-25 DIAGNOSIS — Z48.02 VISIT FOR SUTURE REMOVAL: Primary | ICD-10-CM

## 2023-04-25 PROCEDURE — 99282 EMERGENCY DEPT VISIT SF MDM: CPT

## 2023-04-25 ASSESSMENT — ENCOUNTER SYMPTOMS
CHEST TIGHTNESS: 0
NAUSEA: 0
BACK PAIN: 0
ABDOMINAL PAIN: 0
COUGH: 0
DIARRHEA: 0
CONSTIPATION: 0
COLOR CHANGE: 0
VOMITING: 0
TROUBLE SWALLOWING: 0
SHORTNESS OF BREATH: 0

## 2023-04-25 NOTE — ED NOTES
Pt arrives to ED 26A via triage. Pt states he is here to get his stitches taken out. Pt states he got the stitches applied on 04/17/2023. Pt has no other complaints at this time. Pt respirations are even and unlabored, pt is alert and oriented X 4, speaking in complete sentences, bed is in the lowest position, call light is within reach, NAD noted. Will continue to follow plan of care.         Ramiro Hyman RN  04/25/23 8163

## 2023-04-25 NOTE — ED PROVIDER NOTES
I performed a history and physical examination of the patient and discussed management with the resident. I reviewed the residents note and agree with the documented findings and plan of care. Any areas of disagreement are noted on the chart. I was personally present for the key portions of any procedures. I have documented in the chart those procedures where I was not present during the key portions. I have reviewed the emergency nurses triage note. I agree with the chief complaint, past medical history, past surgical history, allergies, medications, social and family history as documented unless otherwise noted below. Documentation of the HPI, Physical Exam and Medical Decision Making performed by medical students or scribes is based on my personal performance of the HPI, PE and MDM. For Phys Assistant/ Nurse Practitioner cases/documentation I have personally evaluated this patient and have completed at least one if not all key elements of the E/M (history, physical exam, and MDM). I find the patient's history and physical exam are consistent with the NP/PA documentation. I agree with the care provided, treatment rendered, disposition and followup plan. Additional findings are as noted. Teja Cho. Delisa Ellsworth MD  Attending Emergency  Physician      Patient presented to the Emergency Department with history of having sustained a laceration to the left frontal scalp last week when he apparently struck his head on a bedside nightstand. Laceration was repaired here. He reports he had no problems since that time. No swelling, drainage or discharge, bleeding. Denies any headache. On examination patient awake alert. He is cooperative and responsive speech is fluent comprehension is normal.  GCS is 15. Damage the face reveals a healing sutured 3 centimeter laceration of the left frontal scalp. No signs of infection or dehiscence are noted.   Impression: Wound recheck for suture removal.  Plan: Patient will be

## 2023-04-25 NOTE — DISCHARGE INSTRUCTIONS
Follow-up with your primary care doctor within the next week.   Monitor signs for infection includes increased abdominal swelling, redness, yellow drainage or fevers if any of these are present return to the emergency department

## 2023-05-08 DIAGNOSIS — I25.5 ISCHEMIC CARDIOMYOPATHY: ICD-10-CM

## 2023-05-08 DIAGNOSIS — I10 ESSENTIAL HYPERTENSION: ICD-10-CM

## 2023-05-08 DIAGNOSIS — I25.10 CAD S/P PERCUTANEOUS CORONARY ANGIOPLASTY: ICD-10-CM

## 2023-05-08 DIAGNOSIS — Z98.61 CAD S/P PERCUTANEOUS CORONARY ANGIOPLASTY: ICD-10-CM

## 2023-05-08 NOTE — TELEPHONE ENCOUNTER
Prostate cancer adenocarcinoma. Bess stage 6 staus post laparoscopic prostatectomy 2009.      History of right  inguinal hernia repair     Stage 3 severe COPD by GOLD classification ([address]     Erectile dysfunction     Hyperlipidemia     CKD (chronic kidney disease)     Prediabetes     History of colon polyps     CAD in native artery     Iron deficiency anemia secondary to inadequate dietary iron intake     Malabsorption     Chronic renal disease, stage III (HCC) [215437]     Chronic systolic (congestive) heart failure

## 2023-05-23 DIAGNOSIS — I25.10 CAD S/P PERCUTANEOUS CORONARY ANGIOPLASTY: ICD-10-CM

## 2023-05-23 DIAGNOSIS — Z98.61 CAD S/P PERCUTANEOUS CORONARY ANGIOPLASTY: ICD-10-CM

## 2023-05-23 DIAGNOSIS — I10 ESSENTIAL HYPERTENSION: ICD-10-CM

## 2023-05-23 RX ORDER — CLOPIDOGREL BISULFATE 75 MG/1
75 TABLET ORAL DAILY
Qty: 90 TABLET | Refills: 0 | Status: SHIPPED | OUTPATIENT
Start: 2023-05-23

## 2023-05-23 RX ORDER — AMLODIPINE BESYLATE 5 MG/1
5 TABLET ORAL DAILY
Qty: 90 TABLET | Refills: 0 | Status: SHIPPED | OUTPATIENT
Start: 2023-05-23

## 2023-05-23 NOTE — TELEPHONE ENCOUNTER
Medication refill for Clopidogrel    Last visit: 11/15/22  Last Med refill: 12/2/22  Does patient have enough medication for 72 hours: No:     Next Visit Date:  Future Appointments   Date Time Provider Nando Haylie   6/13/2023  1:30 PM Gay Leavitt MD [id number] [facility] Maintenance   Topic Date Due    Prostate Specific Antigen (PSA) Screening or Monitoring  10/07/2022    Annual Wellness Visit (AWV)  12/22/2022    A1C test (Diabetic or Prediabetic)  02/15/2023    Lipids  02/22/2023    Shingles vaccine (1 of 2) 08/09/2023 (Originally 10/12/2004)    GFR test (Diabetes, CKD 3-4, OR last GFR 15-59)  08/19/2023    Low dose CT lung screening  08/19/2023    Depression Screen  11/15/2023    Colorectal Cancer Screen  01/23/2028    DTaP/Tdap/Td vaccine (2 - Td or Tdap) 09/03/2030    Flu vaccine  Completed    Pneumococcal 65+ years Vaccine  Completed    COVID-19 Vaccine  Completed    AAA screen  Completed    Hepatitis C screen  Completed    Hepatitis A vaccine  Aged Out    Hib vaccine  Aged Out    Meningococcal (ACWY) vaccine  Aged Out    HIV screen  Discontinued       Hemoglobin A1C (%)   Date Value   02/15/2022 6.1   03/18/2021 6.2 (H)   06/03/2020 6.3 (H)             ( goal A1C is < 7)   Microalb/Crt.  Ratio (mcg/mg creat)   Date Value   07/27/2018 CANNOT BE CALCULATED     LDL Cholesterol (mg/dL)   Date Value   02/22/2022 70   06/03/2020 51       (goal LDL is <100)   AST (U/L)   Date Value   02/22/2022 17     ALT (U/L)   Date Value   02/22/2022 17     BUN (mg/dL)   Date Value   08/19/2022 6 (L)     BP Readings from Last 3 Encounters:   04/25/23 (!) 142/97   04/17/23 (!) 151/104   06/09/22 124/87          (goal 120/80)    All Future Testing planned in [facility]  Lab Frequency Next Occurrence   CBC with Auto Differential Once 07/18/2023   Ferritin Once 07/18/2023   Iron and TIBC Once 07/18/2023   Transfuse RBC Transfusion                Patient Active Problem List:     HTN (hypertension)     Prostate

## 2023-06-05 DIAGNOSIS — I25.10 CAD S/P PERCUTANEOUS CORONARY ANGIOPLASTY: ICD-10-CM

## 2023-06-05 DIAGNOSIS — Z98.61 CAD S/P PERCUTANEOUS CORONARY ANGIOPLASTY: ICD-10-CM

## 2023-06-05 NOTE — TELEPHONE ENCOUNTER
Medication refill for Atorvastatin    Last visit: 11/15/22  Last Med refill: 11/15/22  Does patient have enough medication for 72 hours: No:     Next Visit Date:  Future Appointments   Date Time Provider Nando Stallings   6/13/2023  1:30 PM Ania Last MD [id number] [facility] Maintenance   Topic Date Due    Prostate Specific Antigen (PSA) Screening or Monitoring  10/07/2022    Annual Wellness Visit (AWV)  12/22/2022    A1C test (Diabetic or Prediabetic)  02/15/2023    Lipids  02/22/2023    Shingles vaccine (1 of 2) 08/09/2023 (Originally 10/12/2004)    GFR test (Diabetes, CKD 3-4, OR last GFR 15-59)  08/19/2023    Low dose CT lung screening  08/19/2023    Depression Screen  11/15/2023    Colorectal Cancer Screen  01/23/2028    DTaP/Tdap/Td vaccine (2 - Td or Tdap) 09/03/2030    Flu vaccine  Completed    Pneumococcal 65+ years Vaccine  Completed    COVID-19 Vaccine  Completed    AAA screen  Completed    Hepatitis C screen  Completed    Hepatitis A vaccine  Aged Out    Hib vaccine  Aged Out    Meningococcal (ACWY) vaccine  Aged Out    HIV screen  Discontinued       Hemoglobin A1C (%)   Date Value   02/15/2022 6.1   03/18/2021 6.2 (H)   06/03/2020 6.3 (H)             ( goal A1C is < 7)   Microalb/Crt.  Ratio (mcg/mg creat)   Date Value   07/27/2018 CANNOT BE CALCULATED     LDL Cholesterol (mg/dL)   Date Value   02/22/2022 70   06/03/2020 51       (goal LDL is <100)   AST (U/L)   Date Value   02/22/2022 17     ALT (U/L)   Date Value   02/22/2022 17     BUN (mg/dL)   Date Value   08/19/2022 6 (L)     BP Readings from Last 3 Encounters:   04/25/23 (!) 142/97   04/17/23 (!) 151/104   06/09/22 124/87          (goal 120/80)    All Future Testing planned in [facility]  Lab Frequency Next Occurrence   CBC with Auto Differential Once 07/18/2023   Ferritin Once 07/18/2023   Iron and TIBC Once 07/18/2023   Transfuse RBC Transfusion                Patient Active Problem List:     HTN (hypertension)     Prostate

## 2023-06-06 RX ORDER — ATORVASTATIN CALCIUM 40 MG/1
TABLET, FILM COATED ORAL
Qty: 30 TABLET | Refills: 0 | Status: SHIPPED | OUTPATIENT
Start: 2023-06-06 | End: 2023-06-08 | Stop reason: SDUPTHER

## 2023-06-08 DIAGNOSIS — I25.10 CAD S/P PERCUTANEOUS CORONARY ANGIOPLASTY: ICD-10-CM

## 2023-06-08 DIAGNOSIS — Z98.61 CAD S/P PERCUTANEOUS CORONARY ANGIOPLASTY: ICD-10-CM

## 2023-06-08 RX ORDER — ATORVASTATIN CALCIUM 40 MG/1
TABLET, FILM COATED ORAL
Qty: 90 TABLET | Refills: 0 | Status: SHIPPED | OUTPATIENT
Start: 2023-06-08

## 2023-06-10 ENCOUNTER — HOSPITAL ENCOUNTER (OUTPATIENT)
Age: 68
Discharge: HOME | End: 2023-06-10
Payer: MEDICARE

## 2023-06-10 DIAGNOSIS — D50.8 IRON DEFICIENCY ANEMIA SECONDARY TO INADEQUATE DIETARY IRON INTAKE: ICD-10-CM

## 2023-06-10 LAB
BASOPHILS # BLD: 0.04 K/UL (ref 0–0.2)
BASOPHILS NFR BLD: 1 % (ref 0–2)
EOSINOPHIL # BLD: 0.33 K/UL (ref 0–0.44)
EOSINOPHILS RELATIVE PERCENT: 6 % (ref 1–4)
ERYTHROCYTE [DISTWIDTH] IN BLOOD BY AUTOMATED COUNT: 13.2 % (ref 11.8–14.4)
FERRITIN SERPL-MCNC: 39 NG/ML (ref 30–400)
HCT VFR BLD AUTO: 43.7 % (ref 40.7–50.3)
HGB BLD-MCNC: 13.4 G/DL (ref 13–17)
IMM GRANULOCYTES # BLD AUTO: 0.02 K/UL (ref 0–0.3)
IMM GRANULOCYTES NFR BLD: 0 %
IRON SATN MFR SERPL: 25 % (ref 20–55)
IRON SERPL-MCNC: 76 UG/DL (ref 59–158)
LYMPHOCYTES # BLD: 33 % (ref 24–43)
LYMPHOCYTES NFR BLD: 1.84 K/UL (ref 1.1–3.7)
MCH RBC QN AUTO: 28.8 PG (ref 25.2–33.5)
MCHC RBC AUTO-ENTMCNC: 30.7 G/DL (ref 28.4–34.8)
MCV RBC AUTO: 94 FL (ref 82.6–102.9)
MONOCYTES NFR BLD: 0.53 K/UL (ref 0.1–1.2)
MONOCYTES NFR BLD: 10 % (ref 3–12)
NEUTROPHILS NFR BLD: 50 % (ref 36–65)
NEUTS SEG NFR BLD: 2.84 K/UL (ref 1.5–8.1)
NRBC AUTOMATED: 0 PER 100 WBC
PLATELET # BLD AUTO: 258 K/UL (ref 138–453)
PMV BLD AUTO: 9 FL (ref 8.1–13.5)
RBC # BLD AUTO: 4.65 M/UL (ref 4.21–5.77)
TIBC SERPL-MCNC: 300 UG/DL (ref 250–450)
UNSATURATED IRON BINDING CAPACITY: 224 UG/DL (ref 112–347)
WBC OTHER # BLD: 5.6 K/UL (ref 3.5–11.3)

## 2023-06-10 PROCEDURE — 36415 COLL VENOUS BLD VENIPUNCTURE: CPT

## 2023-06-10 PROCEDURE — 83540 ASSAY OF IRON: CPT

## 2023-06-10 PROCEDURE — 82728 ASSAY OF FERRITIN: CPT

## 2023-06-10 PROCEDURE — 83550 IRON BINDING TEST: CPT

## 2023-06-10 PROCEDURE — 85027 COMPLETE CBC AUTOMATED: CPT

## 2023-08-01 ENCOUNTER — TELEPHONE (OUTPATIENT)
Dept: ONCOLOGY | Age: 68
End: 2023-08-01

## 2023-08-01 DIAGNOSIS — Z87.891 PERSONAL HISTORY OF NICOTINE DEPENDENCE: Primary | ICD-10-CM

## 2023-08-01 NOTE — TELEPHONE ENCOUNTER
Our records indicate that your patient is coming due for their annual lung cancer screening follow up testing. For your convenience, we have pended the order for the scan for you. If you do not agree with the need for the test, please cancel the order and let us know. Sincerely,    [address] Screening Program    Auto printed reminder letter sent to patient.

## 2023-08-04 DIAGNOSIS — I10 ESSENTIAL HYPERTENSION: ICD-10-CM

## 2023-08-04 DIAGNOSIS — I25.5 ISCHEMIC CARDIOMYOPATHY: ICD-10-CM

## 2023-08-04 DIAGNOSIS — Z98.61 CAD S/P PERCUTANEOUS CORONARY ANGIOPLASTY: ICD-10-CM

## 2023-08-04 DIAGNOSIS — I25.10 CAD S/P PERCUTANEOUS CORONARY ANGIOPLASTY: ICD-10-CM

## 2023-08-04 NOTE — TELEPHONE ENCOUNTER
Pharmacy is requesting for refills for Metoprolol Tartrate 25mg Tablets BID. Called the patient to schedule an appointment but he was unable to be reached. LVM to return the call to the office to schedule. Please review and e-scribe to pharmacy listed in chart if appropriate. Thank you.       Next Visit Date: none scheduled  Last Visit Date: 11/15/2022    Future Appointments   Date Time Provider [address]   6/11/2024  1:00 PM Marilynn Resendiz MD [address] Maintenance   Topic Date Due    Prostate Specific Antigen (PSA) Screening or Monitoring  10/07/2022    Annual Wellness Visit (AWV)  12/22/2022    A1C test (Diabetic or Prediabetic)  02/15/2023    Lipids  02/22/2023    Flu vaccine (1) 08/01/2023    GFR test (Diabetes, CKD 3-4, OR last GFR 15-59)  08/19/2023    Shingles vaccine (1 of 2) 08/09/2023 (Originally 10/12/2004)    Low dose CT lung screening &/or counseling  08/19/2023    Depression Screen  11/15/2023    Colorectal Cancer Screen  01/23/2028    DTaP/Tdap/Td vaccine (2 - Td or Tdap) 09/03/2030    Pneumococcal 65+ years Vaccine  Completed    COVID-19 Vaccine  Completed    AAA screen  Completed    Hepatitis C screen  Completed    Hepatitis A vaccine  Aged Out    Hib vaccine  Aged Out    Meningococcal (ACWY) vaccine  Aged Out    HIV screen  Discontinued       Hemoglobin A1C (%)   Date Value   02/15/2022 6.1   03/18/2021 6.2 (H)   06/03/2020 6.3 (H)             ( goal A1C is < 7)   No components found for: LABMICR  LDL Cholesterol (mg/dL)   Date Value   02/22/2022 70       (goal LDL is <100)   AST (U/L)   Date Value   02/22/2022 17     ALT (U/L)   Date Value   02/22/2022 17     BUN (mg/dL)   Date Value   08/19/2022 6 (L)     BP Readings from Last 3 Encounters:   06/13/23 117/83   04/25/23 (!) 142/97   04/17/23 (!) 151/104          (goal 120/80)    All Future Testing planned in [facility]  Lab Frequency Next Occurrence   CBC with Auto Differential Once 06/13/2024   Ferritin Once

## 2023-08-16 DIAGNOSIS — J44.9 STAGE 3 SEVERE COPD BY GOLD CLASSIFICATION (HCC): ICD-10-CM

## 2023-08-16 NOTE — TELEPHONE ENCOUNTER
Pharmacy is requesting for refills for Robby Ellipta 200-62.5-25 MCG/ACT AEPB Inhaler. Called the patient to schedule an appointment but he was unable to be reached. LVM to return the call to the office to schedule. Please review and e-scribe to pharmacy listed in chart if appropriate. Thank you.       Next Visit Date: None scheduled  Last Visit Date: 11/15/22    Future Appointments   Date Time Provider [address]   6/11/2024  1:00 PM Bela Alvarez MD [address] Maintenance   Topic Date Due    Shingles vaccine (1 of 2) Never done    Prostate Specific Antigen (PSA) Screening or Monitoring  10/07/2022    Annual Wellness Visit (AWV)  12/22/2022    A1C test (Diabetic or Prediabetic)  02/15/2023    Lipids  02/22/2023    Flu vaccine (1) 08/01/2023    GFR test (Diabetes, CKD 3-4, OR last GFR 15-59)  08/19/2023    Low dose CT lung screening &/or counseling  08/19/2023    Depression Screen  11/15/2023    Colorectal Cancer Screen  01/23/2028    DTaP/Tdap/Td vaccine (2 - Td or Tdap) 09/03/2030    Pneumococcal 65+ years Vaccine  Completed    COVID-19 Vaccine  Completed    AAA screen  Completed    Hepatitis C screen  Completed    Hepatitis A vaccine  Aged Out    Hib vaccine  Aged Out    Meningococcal (ACWY) vaccine  Aged Out    HIV screen  Discontinued       Hemoglobin A1C (%)   Date Value   02/15/2022 6.1   03/18/2021 6.2 (H)   06/03/2020 6.3 (H)             ( goal A1C is < 7)   No components found for: LABMICR  LDL Cholesterol (mg/dL)   Date Value   02/22/2022 70       (goal LDL is <100)   AST (U/L)   Date Value   02/22/2022 17     ALT (U/L)   Date Value   02/22/2022 17     BUN (mg/dL)   Date Value   08/19/2022 6 (L)     BP Readings from Last 3 Encounters:   06/13/23 117/83   04/25/23 (!) 142/97   04/17/23 (!) 151/104          (goal 120/80)    All Future Testing planned in [facility]  Lab Frequency Next Occurrence   CBC with Auto Differential Once 06/13/2024   Ferritin Once 06/13/2024   Iron

## 2023-08-17 DIAGNOSIS — I10 ESSENTIAL HYPERTENSION: ICD-10-CM

## 2023-08-17 DIAGNOSIS — I25.10 CAD S/P PERCUTANEOUS CORONARY ANGIOPLASTY: ICD-10-CM

## 2023-08-17 DIAGNOSIS — Z98.61 CAD S/P PERCUTANEOUS CORONARY ANGIOPLASTY: ICD-10-CM

## 2023-08-17 RX ORDER — CLOPIDOGREL BISULFATE 75 MG/1
75 TABLET ORAL DAILY
Qty: 90 TABLET | Refills: 0 | Status: SHIPPED | OUTPATIENT
Start: 2023-08-17

## 2023-08-17 RX ORDER — AMLODIPINE BESYLATE 5 MG/1
5 TABLET ORAL DAILY
Qty: 90 TABLET | Refills: 0 | Status: SHIPPED | OUTPATIENT
Start: 2023-08-17

## 2023-08-17 RX ORDER — FLUTICASONE FUROATE, UMECLIDINIUM BROMIDE AND VILANTEROL TRIFENATATE 200; 62.5; 25 UG/1; UG/1; UG/1
1 POWDER RESPIRATORY (INHALATION) DAILY
Qty: 1 EACH | Refills: 5 | Status: SHIPPED | OUTPATIENT
Start: 2023-08-17

## 2023-08-17 NOTE — TELEPHONE ENCOUNTER
Pharmacy is requesting for refills for Amlodipine 5mg tablets and Clopidogrel 75mg tablets. Called the patient to schedule an appointment but he was unable to be reached. LVM to return the call to the office to schedule. Please review and e-scribe to pharmacy listed in chart if appropriate. Thank you.       Next Visit Date: none scheduled  Last Visit Date: 11/15/22    Future Appointments   Date Time Provider [address]   6/11/2024  1:00 PM Sherley Campos MD [address] Maintenance   Topic Date Due    Shingles vaccine (1 of 2) Never done    Prostate Specific Antigen (PSA) Screening or Monitoring  10/07/2022    Annual Wellness Visit (AWV)  12/22/2022    A1C test (Diabetic or Prediabetic)  02/15/2023    Lipids  02/22/2023    Flu vaccine (1) 08/01/2023    GFR test (Diabetes, CKD 3-4, OR last GFR 15-59)  08/19/2023    Low dose CT lung screening &/or counseling  08/19/2023    Depression Screen  11/15/2023    Colorectal Cancer Screen  01/23/2028    DTaP/Tdap/Td vaccine (2 - Td or Tdap) 09/03/2030    Pneumococcal 65+ years Vaccine  Completed    COVID-19 Vaccine  Completed    AAA screen  Completed    Hepatitis C screen  Completed    Hepatitis A vaccine  Aged Out    Hib vaccine  Aged Out    Meningococcal (ACWY) vaccine  Aged Out    HIV screen  Discontinued       Hemoglobin A1C (%)   Date Value   02/15/2022 6.1   03/18/2021 6.2 (H)   06/03/2020 6.3 (H)             ( goal A1C is < 7)   No components found for: LABMICR  LDL Cholesterol (mg/dL)   Date Value   02/22/2022 70       (goal LDL is <100)   AST (U/L)   Date Value   02/22/2022 17     ALT (U/L)   Date Value   02/22/2022 17     BUN (mg/dL)   Date Value   08/19/2022 6 (L)     BP Readings from Last 3 Encounters:   06/13/23 117/83   04/25/23 (!) 142/97   04/17/23 (!) 151/104          (goal 120/80)    All Future Testing planned in [facility]  Lab Frequency Next Occurrence   CBC with Auto Differential Once 06/13/2024   Ferritin Once 06/13/2024

## 2023-09-05 ENCOUNTER — HOSPITAL ENCOUNTER (OUTPATIENT)
Dept: CT IMAGING | Age: 68
Discharge: HOME | End: 2023-09-07
Attending: INTERNAL MEDICINE
Payer: MEDICARE

## 2023-09-05 DIAGNOSIS — Z87.891 PERSONAL HISTORY OF NICOTINE DEPENDENCE: ICD-10-CM

## 2023-09-05 PROCEDURE — 71271 CT THORAX LUNG CANCER SCR C-: CPT

## 2023-10-06 DIAGNOSIS — I25.10 CAD S/P PERCUTANEOUS CORONARY ANGIOPLASTY: ICD-10-CM

## 2023-10-06 DIAGNOSIS — Z98.61 CAD S/P PERCUTANEOUS CORONARY ANGIOPLASTY: ICD-10-CM

## 2023-10-06 RX ORDER — ATORVASTATIN CALCIUM 40 MG/1
TABLET, FILM COATED ORAL
Qty: 90 TABLET | Refills: 0 | Status: CANCELLED | OUTPATIENT
Start: 2023-10-06

## 2023-10-06 NOTE — TELEPHONE ENCOUNTER
Request for atorvastatin. Please review and e-scribe to pharmacy listed in chart if appropriate. Thank you. Next Visit Date: Multiple attempts have been made in recent months to get pt scheduled for a f/u appt with no success. PC to pt, had no problem getting in touch with pt. Pt is scheduled to see Dr. Gilberto Mosquera during geriatric clinic for further refills.     Last Visit Date: 11/15/2022    Future Appointments   Date Time Provider [address]   6/11/2024  1:00 PM Gregor Martinez MD [address] Maintenance   Topic Date Due    Shingles vaccine (1 of 2) Never done    Prostate Specific Antigen (PSA) Screening or Monitoring  10/07/2022    Annual Wellness Visit (AWV)  12/22/2022    A1C test (Diabetic or Prediabetic)  02/15/2023    Lipids  02/22/2023    COVID-19 Vaccine (5 - Additional dose for Dottie series) 03/21/2023    Flu vaccine (1) 08/01/2023    GFR test (Diabetes, CKD 3-4, OR last GFR 15-59)  08/19/2023    Depression Screen  11/15/2023    Low dose CT lung screening &/or counseling  09/05/2024    Colorectal Cancer Screen  01/23/2028    DTaP/Tdap/Td vaccine (2 - Td or Tdap) 09/03/2030    Pneumococcal 65+ years Vaccine  Completed    AAA screen  Completed    Hepatitis C screen  Completed    Hepatitis A vaccine  Aged Out    Hepatitis B vaccine  Aged Out    Hib vaccine  Aged Out    Meningococcal (ACWY) vaccine  Aged Out    HIV screen  Discontinued       Hemoglobin A1C (%)   Date Value   02/15/2022 6.1   03/18/2021 6.2 (H)   06/03/2020 6.3 (H)             ( goal A1C is < 7)   No components found for: \"LABMICR\"  LDL Cholesterol (mg/dL)   Date Value   02/22/2022 70       (goal LDL is <100)   AST (U/L)   Date Value   02/22/2022 17     ALT (U/L)   Date Value   02/22/2022 17     BUN (mg/dL)   Date Value   08/19/2022 6 (L)     BP Readings from Last 3 Encounters:   06/13/23 117/83   04/25/23 (!) 142/97   04/17/23 (!) 151/104          (goal 120/80)    All Future Testing planned in

## 2023-10-06 NOTE — PROGRESS NOTES
Pt is on wait list for an appt  Last visit: 11/15/22  Last Med refill: 6/8/23  Does patient have enough medication for 72 hours: No:     Next Visit Date:  Future Appointments   Date Time Provider [address]   6/11/2024  1:00 PM Cam Martinez MD [address] Maintenance   Topic Date Due    Shingles vaccine (1 of 2) Never done    Prostate Specific Antigen (PSA) Screening or Monitoring  10/07/2022    Annual Wellness Visit (AWV)  12/22/2022    A1C test (Diabetic or Prediabetic)  02/15/2023    Lipids  02/22/2023    COVID-19 Vaccine (5 - Additional dose for Dottie series) 03/21/2023    Flu vaccine (1) 08/01/2023    GFR test (Diabetes, CKD 3-4, OR last GFR 15-59)  08/19/2023    Depression Screen  11/15/2023    Low dose CT lung screening &/or counseling  09/05/2024    Colorectal Cancer Screen  01/23/2028    DTaP/Tdap/Td vaccine (2 - Td or Tdap) 09/03/2030    Pneumococcal 65+ years Vaccine  Completed    AAA screen  Completed    Hepatitis C screen  Completed    Hepatitis A vaccine  Aged Out    Hepatitis B vaccine  Aged Out    Hib vaccine  Aged Out    Meningococcal (ACWY) vaccine  Aged Out    HIV screen  Discontinued       Hemoglobin A1C (%)   Date Value   02/15/2022 6.1   03/18/2021 6.2 (H)   06/03/2020 6.3 (H)             ( goal A1C is < 7)   No components found for: \"LABMICR\"  LDL Cholesterol (mg/dL)   Date Value   02/22/2022 70   06/03/2020 51       (goal LDL is <100)   AST (U/L)   Date Value   02/22/2022 17     ALT (U/L)   Date Value   02/22/2022 17     BUN (mg/dL)   Date Value   08/19/2022 6 (L)     BP Readings from Last 3 Encounters:   06/13/23 117/83   04/25/23 (!) 142/97   04/17/23 (!) 151/104          (goal 120/80)    All Future Testing planned in [facility]  Lab Frequency Next Occurrence   CBC with Auto Differential Once 06/13/2024   Ferritin Once 06/13/2024   Iron and TIBC Once 06/13/2024   Transfuse RBC Transfusion                Patient Active Problem List:     HTN

## 2023-10-08 RX ORDER — ATORVASTATIN CALCIUM 40 MG/1
TABLET, FILM COATED ORAL
Qty: 30 TABLET | Refills: 0 | Status: SHIPPED | OUTPATIENT
Start: 2023-10-08

## 2023-10-17 ENCOUNTER — OFFICE VISIT (OUTPATIENT)
Dept: INTERNAL MEDICINE | Age: 68
End: 2023-10-17
Payer: MEDICARE

## 2023-10-17 ENCOUNTER — HOSPITAL ENCOUNTER (OUTPATIENT)
Age: 68
Setting detail: SPECIMEN
Discharge: HOME | End: 2023-10-17

## 2023-10-17 VITALS
OXYGEN SATURATION: 99 % | HEART RATE: 99 BPM | RESPIRATION RATE: 16 BRPM | SYSTOLIC BLOOD PRESSURE: 120 MMHG | WEIGHT: 158.4 LBS | DIASTOLIC BLOOD PRESSURE: 72 MMHG | TEMPERATURE: 97 F | HEIGHT: 72 IN | BODY MASS INDEX: 21.45 KG/M2

## 2023-10-17 DIAGNOSIS — R73.03 PREDIABETES: ICD-10-CM

## 2023-10-17 DIAGNOSIS — D50.8 IRON DEFICIENCY ANEMIA SECONDARY TO INADEQUATE DIETARY IRON INTAKE: ICD-10-CM

## 2023-10-17 DIAGNOSIS — Z00.00 ENCOUNTER FOR ANNUAL WELLNESS EXAM IN MEDICARE PATIENT: Primary | ICD-10-CM

## 2023-10-17 DIAGNOSIS — Z86.010 HISTORY OF COLON POLYPS: ICD-10-CM

## 2023-10-17 DIAGNOSIS — K90.89 OTHER SPECIFIED INTESTINAL MALABSORPTION: ICD-10-CM

## 2023-10-17 DIAGNOSIS — F09 MILD COGNITIVE DISORDER: ICD-10-CM

## 2023-10-17 DIAGNOSIS — I25.5 ISCHEMIC CARDIOMYOPATHY: ICD-10-CM

## 2023-10-17 DIAGNOSIS — I70.213 INTERMITTENT CLAUDICATION OF BOTH LOWER EXTREMITIES DUE TO ATHEROSCLEROSIS (HCC): ICD-10-CM

## 2023-10-17 DIAGNOSIS — J44.9 STAGE 3 SEVERE COPD BY GOLD CLASSIFICATION (HCC): ICD-10-CM

## 2023-10-17 DIAGNOSIS — Z98.61 CAD S/P PERCUTANEOUS CORONARY ANGIOPLASTY: ICD-10-CM

## 2023-10-17 DIAGNOSIS — Z00.00 MEDICARE ANNUAL WELLNESS VISIT, SUBSEQUENT: ICD-10-CM

## 2023-10-17 DIAGNOSIS — I25.10 CAD S/P PERCUTANEOUS CORONARY ANGIOPLASTY: ICD-10-CM

## 2023-10-17 DIAGNOSIS — I10 ESSENTIAL HYPERTENSION: ICD-10-CM

## 2023-10-17 DIAGNOSIS — K59.00 CONSTIPATION, UNSPECIFIED CONSTIPATION TYPE: ICD-10-CM

## 2023-10-17 DIAGNOSIS — Z00.00 ENCOUNTER FOR ANNUAL WELLNESS EXAM IN MEDICARE PATIENT: ICD-10-CM

## 2023-10-17 LAB
BASOPHILS # BLD: 0.04 K/UL (ref 0–0.2)
BASOPHILS NFR BLD: 1 % (ref 0–2)
EOSINOPHIL # BLD: 0.28 K/UL (ref 0–0.44)
EOSINOPHILS RELATIVE PERCENT: 5 % (ref 1–4)
ERYTHROCYTE [DISTWIDTH] IN BLOOD BY AUTOMATED COUNT: 13.6 % (ref 11.8–14.4)
HCT VFR BLD AUTO: 46.7 % (ref 40.7–50.3)
HGB BLD-MCNC: 14.3 G/DL (ref 13–17)
IMM GRANULOCYTES # BLD AUTO: <0.03 K/UL (ref 0–0.3)
IMM GRANULOCYTES NFR BLD: 0 %
LYMPHOCYTES NFR BLD: 1.66 K/UL (ref 1.1–3.7)
LYMPHOCYTES RELATIVE PERCENT: 30 % (ref 24–43)
MCH RBC QN AUTO: 29.1 PG (ref 25.2–33.5)
MCHC RBC AUTO-ENTMCNC: 30.6 G/DL (ref 28.4–34.8)
MCV RBC AUTO: 94.9 FL (ref 82.6–102.9)
MONOCYTES NFR BLD: 0.59 K/UL (ref 0.1–1.2)
MONOCYTES NFR BLD: 11 % (ref 3–12)
NEUTROPHILS NFR BLD: 53 % (ref 36–65)
NEUTS SEG NFR BLD: 2.99 K/UL (ref 1.5–8.1)
NRBC BLD-RTO: 0 PER 100 WBC
PLATELET # BLD AUTO: 304 K/UL (ref 138–453)
PMV BLD AUTO: 9.2 FL (ref 8.1–13.5)
RBC # BLD AUTO: 4.92 M/UL (ref 4.21–5.77)
WBC OTHER # BLD: 5.6 K/UL (ref 3.5–11.3)

## 2023-10-17 PROCEDURE — 1123F ACP DISCUSS/DSCN MKR DOCD: CPT

## 2023-10-17 PROCEDURE — 3078F DIAST BP <80 MM HG: CPT

## 2023-10-17 PROCEDURE — G0439 PPPS, SUBSEQ VISIT: HCPCS

## 2023-10-17 PROCEDURE — G8484 FLU IMMUNIZE NO ADMIN: HCPCS

## 2023-10-17 PROCEDURE — 3017F COLORECTAL CA SCREEN DOC REV: CPT

## 2023-10-17 PROCEDURE — 3074F SYST BP LT 130 MM HG: CPT

## 2023-10-17 RX ORDER — FLUTICASONE FUROATE, UMECLIDINIUM BROMIDE AND VILANTEROL TRIFENATATE 200; 62.5; 25 UG/1; UG/1; UG/1
1 POWDER RESPIRATORY (INHALATION) DAILY
Qty: 1 EACH | Refills: 5 | Status: SHIPPED | OUTPATIENT
Start: 2023-10-17

## 2023-10-17 RX ORDER — ISOSORBIDE MONONITRATE 30 MG/1
30 TABLET, EXTENDED RELEASE ORAL DAILY
Qty: 90 TABLET | Refills: 2 | Status: SHIPPED | OUTPATIENT
Start: 2023-10-17

## 2023-10-17 RX ORDER — ASPIRIN 81 MG/1
81 TABLET, CHEWABLE ORAL DAILY
Qty: 30 TABLET | Refills: 3 | Status: SHIPPED | OUTPATIENT
Start: 2023-10-17

## 2023-10-17 RX ORDER — ATORVASTATIN CALCIUM 40 MG/1
TABLET, FILM COATED ORAL
Qty: 30 TABLET | Refills: 0 | Status: SHIPPED | OUTPATIENT
Start: 2023-10-17

## 2023-10-17 RX ORDER — PANTOPRAZOLE SODIUM 40 MG/1
40 TABLET, DELAYED RELEASE ORAL DAILY
Qty: 90 TABLET | Refills: 3 | Status: SHIPPED | OUTPATIENT
Start: 2023-10-17

## 2023-10-17 RX ORDER — ALBUTEROL SULFATE 90 UG/1
2 AEROSOL, METERED RESPIRATORY (INHALATION) EVERY 6 HOURS PRN
Qty: 1 EACH | Refills: 5 | Status: SHIPPED | OUTPATIENT
Start: 2023-10-17

## 2023-10-17 RX ORDER — CLOPIDOGREL BISULFATE 75 MG/1
75 TABLET ORAL DAILY
Qty: 90 TABLET | Refills: 0 | Status: SHIPPED | OUTPATIENT
Start: 2023-10-17

## 2023-10-17 RX ORDER — AMLODIPINE BESYLATE 5 MG/1
5 TABLET ORAL DAILY
Qty: 90 TABLET | Refills: 0 | Status: SHIPPED | OUTPATIENT
Start: 2023-10-17

## 2023-10-17 RX ORDER — DOCUSATE SODIUM 100 MG/1
CAPSULE, LIQUID FILLED ORAL
Qty: 60 CAPSULE | Refills: 5 | Status: SHIPPED | OUTPATIENT
Start: 2023-10-17

## 2023-10-17 SDOH — ECONOMIC STABILITY: HOUSING INSECURITY
IN THE LAST 12 MONTHS, WAS THERE A TIME WHEN YOU DID NOT HAVE A STEADY PLACE TO SLEEP OR SLEPT IN A SHELTER (INCLUDING NOW)?: NO

## 2023-10-17 SDOH — ECONOMIC STABILITY: FOOD INSECURITY: WITHIN THE PAST 12 MONTHS, YOU WORRIED THAT YOUR FOOD WOULD RUN OUT BEFORE YOU GOT MONEY TO BUY MORE.: NEVER TRUE

## 2023-10-17 SDOH — ECONOMIC STABILITY: INCOME INSECURITY: HOW HARD IS IT FOR YOU TO PAY FOR THE VERY BASICS LIKE FOOD, HOUSING, MEDICAL CARE, AND HEATING?: NOT HARD AT ALL

## 2023-10-17 SDOH — ECONOMIC STABILITY: FOOD INSECURITY: WITHIN THE PAST 12 MONTHS, THE FOOD YOU BOUGHT JUST DIDN'T LAST AND YOU DIDN'T HAVE MONEY TO GET MORE.: NEVER TRUE

## 2023-10-17 ASSESSMENT — PATIENT HEALTH QUESTIONNAIRE - PHQ9
SUM OF ALL RESPONSES TO PHQ QUESTIONS 1-9: 0
1. LITTLE INTEREST OR PLEASURE IN DOING THINGS: 0
2. FEELING DOWN, DEPRESSED OR HOPELESS: 0
SUM OF ALL RESPONSES TO PHQ QUESTIONS 1-9: 0
SUM OF ALL RESPONSES TO PHQ9 QUESTIONS 1 & 2: 0
SUM OF ALL RESPONSES TO PHQ QUESTIONS 1-9: 0
SUM OF ALL RESPONSES TO PHQ QUESTIONS 1-9: 0

## 2023-10-17 NOTE — PROGRESS NOTES
Attending Physician Statement  I have discussed the care of Carla Gonzalez, including pertinent history and exam findings with the resident. I have reviewed the key elements of all parts of the encounter with the resident. I have seen and examined the patient with the resident and the key elements of all parts of the encounter have been performed by me. I agree with the assessment, and status of the problem list as documented and this was also documented by the resident. The medication list was reviewed with the resident and is up to date. The return visit should be in 3 months . Diagnosis Orders   1. Encounter for annual wellness exam in Medicare patient  Basic Metabolic Panel    CBC with Auto Differential    Lipid Panel      2. Mild cognitive disorder  TSH With Reflex Ft4    Vitamin B12 & Folate      3. Essential hypertension  amLODIPine (NORVASC) 5 MG tablet    metoprolol tartrate (LOPRESSOR) 25 MG tablet      4. Stage 3 severe COPD by GOLD classification ([facility])  albuterol sulfate HFA (PROAIR HFA) 108 (90 Base) MCG/ACT inhaler    fluticasone-umeclidin-vilant (TRELEGY ELLIPTA) 200-62.5-25 MCG/ACT AEPB inhaler      5. Iron deficiency anemia secondary to inadequate dietary iron intake  pantoprazole (PROTONIX) 40 MG tablet      6. CAD S/P percutaneous coronary angioplasty  aspirin 81 MG chewable tablet    atorvastatin (LIPITOR) 40 MG tablet    clopidogrel (PLAVIX) 75 MG tablet    isosorbide mononitrate (IMDUR) 30 MG extended release tablet    metoprolol tartrate (LOPRESSOR) 25 MG tablet    TSH With Reflex Ft4      7. Ischemic cardiomyopathy  metoprolol tartrate (LOPRESSOR) 25 MG tablet      8. Other specified intestinal malabsorption  pantoprazole (PROTONIX) 40 MG tablet      9. Intermittent claudication of both lower extremities due to atherosclerosis ([facility])  aspirin 81 MG chewable tablet      10. Constipation, unspecified constipation type  docusate sodium (STOOL SOFTENER) 100 MG capsule      11.  Medicare annual
ill-appearing. HENT:      Head: Normocephalic and atraumatic. Right Ear: External ear normal.      Left Ear: External ear normal.      Nose: Nose normal. No congestion. Mouth/Throat:      Mouth: Mucous membranes are moist.      Pharynx: Oropharynx is clear. Eyes:      Extraocular Movements: Extraocular movements intact. Pupils: Pupils are equal, round, and reactive to light. Cardiovascular:      Rate and Rhythm: Normal rate and regular rhythm. Pulses: Normal pulses. Pulmonary:      Effort: Pulmonary effort is normal.      Breath sounds: Normal breath sounds. Abdominal:      General: Bowel sounds are normal. There is no distension. Palpations: Abdomen is soft. Tenderness: There is no abdominal tenderness. Musculoskeletal:         General: No swelling. Right lower leg: No edema. Left lower leg: No edema. Skin:     Coloration: Skin is not jaundiced. Neurological:      Mental Status: He is alert and oriented to person, place, and time. Psychiatric:         Mood and Affect: Mood normal.             Allergies   Allergen Reactions    Phenytoin Sodium Extended Rash     Prior to Visit Medications    Medication Sig Taking?  Authorizing Provider   albuterol sulfate HFA (PROAIR HFA) 108 (90 Base) MCG/ACT inhaler Inhale 2 puffs into the lungs every 6 hours as needed for Wheezing Yes Lamona Fast, MD   amLODIPine (NORVASC) 5 MG tablet Take 1 tablet by mouth daily Yes Lamcesar Mays MD   aspirin 81 MG chewable tablet Take 1 tablet by mouth daily Yes Lamcesar Mays MD   atorvastatin (LIPITOR) 40 MG tablet 1 tablet daily Yes Lamona MD Davon   clopidogrel (PLAVIX) 75 MG tablet Take 1 tablet by mouth daily Yes Jr Mays MD   docusate sodium (STOOL SOFTENER) 100 MG capsule TAKE 1 CAPSULE BY MOUTH TWICE DAILY Yes Jr Mays MD   fluticasone-umeclidin-vilant (TRELEGY ELLIPTA) 200-62.5-25 MCG/ACT AEPB inhaler Inhale 1 puff into the lungs daily Yes Jr Mays MD   isosorbide

## 2023-10-18 LAB
ANION GAP SERPL CALCULATED.3IONS-SCNC: 8 MMOL/L (ref 9–17)
BUN SERPL-MCNC: 13 MG/DL (ref 8–23)
CALCIUM SERPL-MCNC: 8.7 MG/DL (ref 8.6–10.4)
CHLORIDE SERPL-SCNC: 102 MMOL/L (ref 98–107)
CHOLEST SERPL-MCNC: 131 MG/DL
CHOLESTEROL/HDL RATIO: 3.6
CO2 SERPL-SCNC: 27 MMOL/L (ref 20–31)
CREAT SERPL-MCNC: 1.3 MG/DL (ref 0.7–1.2)
GFR SERPL CREATININE-BSD FRML MDRD: 59 ML/MIN/1.73M2
GLUCOSE SERPL-MCNC: 81 MG/DL (ref 70–99)
HDLC SERPL-MCNC: 36 MG/DL
LDLC SERPL CALC-MCNC: 75 MG/DL (ref 0–130)
POTASSIUM SERPL-SCNC: 4.4 MMOL/L (ref 3.7–5.3)
SODIUM SERPL-SCNC: 137 MMOL/L (ref 135–144)
TRIGL SERPL-MCNC: 100 MG/DL

## 2023-11-26 DIAGNOSIS — Z98.61 CAD S/P PERCUTANEOUS CORONARY ANGIOPLASTY: ICD-10-CM

## 2023-11-26 DIAGNOSIS — I25.10 CAD S/P PERCUTANEOUS CORONARY ANGIOPLASTY: ICD-10-CM

## 2023-11-27 NOTE — TELEPHONE ENCOUNTER
Request for   Requested Prescriptions     Pending Prescriptions Disp Refills    atorvastatin (LIPITOR) 40 MG tablet [Pharmacy Med Name: ATORVASTATIN 40MG TABLETS] 30 tablet 0     Sig: TAKE 1 TABLET BY MOUTH DAILY    . Please review and e-scribe to pharmacy listed in chart if appropriate. Thank you.       Last Visit Date: 10/17/2023  Next Visit Date: Visit date not found

## 2023-11-28 RX ORDER — ATORVASTATIN CALCIUM 40 MG/1
TABLET, FILM COATED ORAL
Qty: 30 TABLET | Refills: 0 | Status: SHIPPED | OUTPATIENT
Start: 2023-11-28

## 2023-11-29 NOTE — TELEPHONE ENCOUNTER
Faxed request from pharmacy received to change script from 30 days to 90 days. PC to pharmacy, gave verbal auth to fill 90 days.

## 2023-12-02 DIAGNOSIS — Z98.61 CAD S/P PERCUTANEOUS CORONARY ANGIOPLASTY: ICD-10-CM

## 2023-12-02 DIAGNOSIS — I25.10 CAD S/P PERCUTANEOUS CORONARY ANGIOPLASTY: ICD-10-CM

## 2023-12-04 RX ORDER — ISOSORBIDE MONONITRATE 30 MG/1
30 TABLET, EXTENDED RELEASE ORAL EVERY MORNING
Qty: 90 TABLET | Refills: 2 | Status: SHIPPED | OUTPATIENT
Start: 2023-12-04

## 2023-12-04 NOTE — TELEPHONE ENCOUNTER
A Refill Has Been Requested for Sharmin Zamora    Medication Requested  Requested Prescriptions     Pending Prescriptions Disp Refills    isosorbide mononitrate (IMDUR) 30 MG extended release tablet [Pharmacy Med Name: ISOSORBIDE MONONITRATE 30MG ER TABS] 90 tablet 2     Sig: TAKE 1 TABLET BY MOUTH IN THE MORNING       Last Visit Date (If Applicable)  03/72/5499    Next Visit Date (If Applicable)  Visit date not found

## 2024-01-12 DIAGNOSIS — I25.10 CAD S/P PERCUTANEOUS CORONARY ANGIOPLASTY: ICD-10-CM

## 2024-01-12 DIAGNOSIS — Z98.61 CAD S/P PERCUTANEOUS CORONARY ANGIOPLASTY: ICD-10-CM

## 2024-01-12 DIAGNOSIS — I25.5 ISCHEMIC CARDIOMYOPATHY: ICD-10-CM

## 2024-01-12 DIAGNOSIS — I10 ESSENTIAL HYPERTENSION: ICD-10-CM

## 2024-01-15 NOTE — TELEPHONE ENCOUNTER
..Request for   Requested Prescriptions     Pending Prescriptions Disp Refills    metoprolol tartrate (LOPRESSOR) 25 MG tablet [Pharmacy Med Name: METOPROLOL TARTRATE 25MG TABLETS] 180 tablet 0     Sig: TAKE 1 TABLET BY MOUTH TWICE DAILY    .      Please review and e-scribe to pharmacy listed in chart if appropriate. Thank you.      Last Visit Date: 10/17/2023  Next Visit Date: Visit date not found    Future Appointments   Date Time Provider Department Center   6/11/2024  1:00 PM Lacey Martinez MD [facility]       Health Maintenance   Topic Date Due    Shingles vaccine (1 of 2) Never done    Respiratory Syncytial Virus (RSV) Pregnant or age 60 yrs+ (1 - 1-dose 60+ series) Never done    A1C test (Diabetic or Prediabetic)  02/15/2023    COVID-19 Vaccine (5 - 2023-24 season) 09/01/2023    Annual Wellness Visit (Medicare Advantage)  01/01/2024    Prostate Specific Antigen (PSA) Screening or Monitoring  10/17/2024 (Originally 10/7/2022)    Low dose CT lung screening &/or counseling  09/05/2024    Lipids  10/17/2024    Depression Screen  10/17/2024    GFR test (Diabetes, CKD 3-4, OR last GFR 15-59)  10/17/2024    Colorectal Cancer Screen  01/23/2028    DTaP/Tdap/Td vaccine (2 - Td or Tdap) 09/03/2030    Flu vaccine  Completed    Pneumococcal 65+ years Vaccine  Completed    AAA screen  Completed    Hepatitis C screen  Completed    Hepatitis A vaccine  Aged Out    Hepatitis B vaccine  Aged Out    Hib vaccine  Aged Out    Polio vaccine  Aged Out    Meningococcal (ACWY) vaccine  Aged Out    HIV screen  Discontinued       Hemoglobin A1C (%)   Date Value   02/15/2022 6.1   03/18/2021 6.2 (H)   06/03/2020 6.3 (H)             ( goal A1C is < 7)   No components found for: \"LABMICR\"  LDL Cholesterol (mg/dL)   Date Value   10/17/2023 75       (goal LDL is <100)   AST (U/L)   Date Value   02/22/2022 17     ALT (U/L)   Date Value   02/22/2022 17     BUN (mg/dL)   Date Value   10/17/2023 13     BP Readings from Last 3

## 2024-02-08 DIAGNOSIS — Z98.61 CAD S/P PERCUTANEOUS CORONARY ANGIOPLASTY: ICD-10-CM

## 2024-02-08 DIAGNOSIS — I25.10 CAD S/P PERCUTANEOUS CORONARY ANGIOPLASTY: ICD-10-CM

## 2024-02-08 DIAGNOSIS — I10 ESSENTIAL HYPERTENSION: ICD-10-CM

## 2024-02-08 RX ORDER — AMLODIPINE BESYLATE 5 MG/1
5 TABLET ORAL DAILY
Qty: 90 TABLET | Refills: 0 | Status: SHIPPED | OUTPATIENT
Start: 2024-02-08

## 2024-02-08 RX ORDER — CLOPIDOGREL BISULFATE 75 MG/1
75 TABLET ORAL DAILY
Qty: 90 TABLET | Refills: 0 | Status: SHIPPED | OUTPATIENT
Start: 2024-02-08

## 2024-02-08 NOTE — TELEPHONE ENCOUNTER
..Request for   Requested Prescriptions     Pending Prescriptions Disp Refills    amLODIPine (NORVASC) 5 MG tablet [Pharmacy Med Name: AMLODIPINE BESYLATE 5MG TABLETS] 90 tablet 0     Sig: TAKE 1 TABLET BY MOUTH DAILY    clopidogrel (PLAVIX) 75 MG tablet [Pharmacy Med Name: CLOPIDOGREL 75MG TABLETS] 90 tablet 0     Sig: TAKE 1 TABLET BY MOUTH DAILY    .      Please review and e-scribe to pharmacy listed in chart if appropriate. Thank you.      Last Visit Date: 10/17/2023  Next Visit Date: Visit date not found    Future Appointments   Date Time Provider Department Center   6/11/2024  1:00 PM Lacey Martinez MD [facility]       Health Maintenance   Topic Date Due    Shingles vaccine (1 of 2) Never done    Respiratory Syncytial Virus (RSV) Pregnant or age 60 yrs+ (1 - 1-dose 60+ series) Never done    A1C test (Diabetic or Prediabetic)  02/15/2023    COVID-19 Vaccine (5 - 2023-24 season) 09/01/2023    Annual Wellness Visit (Medicare Advantage)  01/01/2024    Prostate Specific Antigen (PSA) Screening or Monitoring  10/17/2024 (Originally 10/7/2022)    Low dose CT lung screening &/or counseling  09/05/2024    Lipids  10/17/2024    Depression Screen  10/17/2024    GFR test (Diabetes, CKD 3-4, OR last GFR 15-59)  10/17/2024    Colorectal Cancer Screen  01/23/2028    DTaP/Tdap/Td vaccine (2 - Td or Tdap) 09/03/2030    Flu vaccine  Completed    Pneumococcal 65+ years Vaccine  Completed    AAA screen  Completed    Hepatitis C screen  Completed    Hepatitis A vaccine  Aged Out    Hepatitis B vaccine  Aged Out    Hib vaccine  Aged Out    Polio vaccine  Aged Out    Meningococcal (ACWY) vaccine  Aged Out    HIV screen  Discontinued       Hemoglobin A1C (%)   Date Value   02/15/2022 6.1   03/18/2021 6.2 (H)   06/03/2020 6.3 (H)             ( goal A1C is < 7)   No components found for: \"LABMICR\"  LDL Cholesterol (mg/dL)   Date Value   10/17/2023 75       (goal LDL is <100)   AST (U/L)   Date Value   02/22/2022 17

## 2024-02-09 DIAGNOSIS — I10 ESSENTIAL HYPERTENSION: ICD-10-CM

## 2024-02-09 DIAGNOSIS — Z98.61 CAD S/P PERCUTANEOUS CORONARY ANGIOPLASTY: ICD-10-CM

## 2024-02-09 DIAGNOSIS — J44.9 STAGE 3 SEVERE COPD BY GOLD CLASSIFICATION (HCC): ICD-10-CM

## 2024-02-09 DIAGNOSIS — I25.5 ISCHEMIC CARDIOMYOPATHY: ICD-10-CM

## 2024-02-09 DIAGNOSIS — I25.10 CAD S/P PERCUTANEOUS CORONARY ANGIOPLASTY: ICD-10-CM

## 2024-02-09 DIAGNOSIS — I70.213 INTERMITTENT CLAUDICATION OF BOTH LOWER EXTREMITIES DUE TO ATHEROSCLEROSIS (HCC): ICD-10-CM

## 2024-02-09 RX ORDER — ATORVASTATIN CALCIUM 40 MG/1
TABLET, FILM COATED ORAL
Qty: 90 TABLET | Refills: 0 | Status: SHIPPED | OUTPATIENT
Start: 2024-02-09

## 2024-02-09 RX ORDER — ASPIRIN 81 MG/1
81 TABLET, CHEWABLE ORAL DAILY
Qty: 30 TABLET | Refills: 3 | Status: SHIPPED | OUTPATIENT
Start: 2024-02-09

## 2024-02-09 RX ORDER — FLUTICASONE FUROATE, UMECLIDINIUM BROMIDE AND VILANTEROL TRIFENATATE 200; 62.5; 25 UG/1; UG/1; UG/1
1 POWDER RESPIRATORY (INHALATION) DAILY
Qty: 60 EACH | Refills: 5 | Status: SHIPPED | OUTPATIENT
Start: 2024-02-09

## 2024-02-09 RX ORDER — ATORVASTATIN CALCIUM 40 MG/1
TABLET, FILM COATED ORAL
Qty: 30 TABLET | Refills: 0 | Status: SHIPPED | OUTPATIENT
Start: 2024-02-09 | End: 2024-02-09

## 2024-02-09 NOTE — TELEPHONE ENCOUNTER
Received faxed request from pharmacy for 90-day supply of atorvastatin. PC to pharmacy to approve, script updated.

## 2024-02-09 NOTE — TELEPHONE ENCOUNTER
Request for   Requested Prescriptions     Pending Prescriptions Disp Refills    fluticasone-umeclidin-vilant (TRELEGY ELLIPTA) 200-62.5-25 MCG/ACT AEPB inhaler 60 each 5     Sig: Inhale 1 puff into the lungs daily    atorvastatin (LIPITOR) 40 MG tablet 30 tablet 0     Sig: TAKE 1 TABLET BY MOUTH DAILY    aspirin 81 MG chewable tablet 30 tablet 3     Sig: Take 1 tablet by mouth daily    .      Please review and e-scribe to pharmacy listed in chart if appropriate. Thank you.      Last Visit Date: 10/17/2023  Next Visit Date: 5/7/2024    Future Appointments   Date Time Provider Department Center   5/7/2024  9:40 AM Adeline Ignacio MD [facility]   6/11/2024  1:00 PM Lacey Martinez MD [facility]       Health Maintenance   Topic Date Due    Shingles vaccine (1 of 2) Never done    Respiratory Syncytial Virus (RSV) Pregnant or age 60 yrs+ (1 - 1-dose 60+ series) Never done    A1C test (Diabetic or Prediabetic)  02/15/2023    COVID-19 Vaccine (5 - 2023-24 season) 09/01/2023    Annual Wellness Visit (Medicare Advantage)  01/01/2024    Prostate Specific Antigen (PSA) Screening or Monitoring  10/17/2024 (Originally 10/7/2022)    Low dose CT lung screening &/or counseling  09/05/2024    Lipids  10/17/2024    Depression Screen  10/17/2024    GFR test (Diabetes, CKD 3-4, OR last GFR 15-59)  10/17/2024    Colorectal Cancer Screen  01/23/2028    DTaP/Tdap/Td vaccine (2 - Td or Tdap) 09/03/2030    Flu vaccine  Completed    Pneumococcal 65+ years Vaccine  Completed    AAA screen  Completed    Hepatitis C screen  Completed    Hepatitis A vaccine  Aged Out    Hepatitis B vaccine  Aged Out    Hib vaccine  Aged Out    Polio vaccine  Aged Out    Meningococcal (ACWY) vaccine  Aged Out    HIV screen  Discontinued       Hemoglobin A1C (%)   Date Value   02/15/2022 6.1   03/18/2021 6.2 (H)   06/03/2020 6.3 (H)             ( goal A1C is < 7)   No components found for: \"LABMICR\"  LDL Cholesterol (mg/dL)   Date Value

## 2024-03-07 DIAGNOSIS — J44.9 STAGE 3 SEVERE COPD BY GOLD CLASSIFICATION (HCC): ICD-10-CM

## 2024-03-08 RX ORDER — ALBUTEROL SULFATE 90 UG/1
2 AEROSOL, METERED RESPIRATORY (INHALATION) EVERY 6 HOURS PRN
Qty: 6.7 G | Refills: 3 | Status: SHIPPED | OUTPATIENT
Start: 2024-03-08

## 2024-03-08 NOTE — TELEPHONE ENCOUNTER
(U/L)   Date Value   02/22/2022 17     BUN (mg/dL)   Date Value   10/17/2023 13     BP Readings from Last 3 Encounters:   10/17/23 120/72   06/13/23 117/83   04/25/23 (!) 142/97          (goal 120/80)    All Future Testing planned in [facility]  Lab Frequency Next Occurrence   CBC with Auto Differential Once 06/13/2024   Ferritin Once 06/13/2024   Iron and TIBC Once 06/13/2024   Hemoglobin A1C Once 10/17/2023   TSH With Reflex Ft4 Once 10/18/2023   Vitamin B12 & Folate Once 10/18/2023   Transfuse RBC Transfusion          Patient Active Problem List:     HTN (hypertension)     Prostate cancer adenocarcinoma. Bess stage 6 staus post laparoscopic prostatectomy 2009.     History of right  inguinal hernia repair     Stage 3 severe COPD by GOLD classification (HCC)     Erectile dysfunction     Hyperlipidemia     CKD (chronic kidney disease)     Prediabetes     History of colon polyps     CAD in native artery     Iron deficiency anemia secondary to inadequate dietary iron intake     Malabsorption     Chronic renal disease, stage III (HCC) [359687]     Chronic systolic (congestive) heart failure

## 2024-03-13 ENCOUNTER — TELEPHONE (OUTPATIENT)
Dept: GASTROENTEROLOGY | Age: 69
End: 2024-03-13

## 2024-03-18 ENCOUNTER — TELEPHONE (OUTPATIENT)
Dept: INTERNAL MEDICINE | Age: 69
End: 2024-03-18

## 2024-03-18 NOTE — TELEPHONE ENCOUNTER
Patient returned call to the office and was informed that he needed an appt for medical clearance for his colonoscopy.  Patient stated that he will wait until his appt in May with Dr Ignacio.

## 2024-04-12 DIAGNOSIS — I10 ESSENTIAL HYPERTENSION: ICD-10-CM

## 2024-04-12 DIAGNOSIS — I25.10 CAD S/P PERCUTANEOUS CORONARY ANGIOPLASTY: ICD-10-CM

## 2024-04-12 DIAGNOSIS — I25.5 ISCHEMIC CARDIOMYOPATHY: ICD-10-CM

## 2024-04-12 DIAGNOSIS — Z98.61 CAD S/P PERCUTANEOUS CORONARY ANGIOPLASTY: ICD-10-CM

## 2024-04-15 NOTE — TELEPHONE ENCOUNTER
A Refill Has Been Requested for Jason Boothe    Medication Requested  Requested Prescriptions     Pending Prescriptions Disp Refills    metoprolol tartrate (LOPRESSOR) 25 MG tablet [Pharmacy Med Name: METOPROLOL TARTRATE 25MG TABLETS] 180 tablet 0     Sig: TAKE 1 TABLET BY MOUTH TWICE DAILY       Last Visit Date (If Applicable)  11/15/2022    Next Visit Date (If Applicable)  5/7/2024

## 2024-05-04 DIAGNOSIS — I25.10 CAD S/P PERCUTANEOUS CORONARY ANGIOPLASTY: ICD-10-CM

## 2024-05-04 DIAGNOSIS — Z98.61 CAD S/P PERCUTANEOUS CORONARY ANGIOPLASTY: ICD-10-CM

## 2024-05-04 DIAGNOSIS — I10 ESSENTIAL HYPERTENSION: ICD-10-CM

## 2024-05-06 RX ORDER — CLOPIDOGREL BISULFATE 75 MG/1
75 TABLET ORAL DAILY
Qty: 90 TABLET | Refills: 0 | Status: SHIPPED | OUTPATIENT
Start: 2024-05-06 | End: 2024-05-07 | Stop reason: SDUPTHER

## 2024-05-06 RX ORDER — AMLODIPINE BESYLATE 5 MG/1
5 TABLET ORAL DAILY
Qty: 90 TABLET | Refills: 0 | Status: SHIPPED | OUTPATIENT
Start: 2024-05-06 | End: 2024-05-07 | Stop reason: SDUPTHER

## 2024-05-06 NOTE — TELEPHONE ENCOUNTER
Jason Boothe is calling to request a refill on the following medication(s):    Medication Request:  Requested Prescriptions     Pending Prescriptions Disp Refills    amLODIPine (NORVASC) 5 MG tablet [Pharmacy Med Name: AMLODIPINE BESYLATE 5MG TABLETS] 90 tablet 0     Sig: TAKE 1 TABLET BY MOUTH DAILY    clopidogrel (PLAVIX) 75 MG tablet [Pharmacy Med Name: CLOPIDOGREL 75MG TABLETS] 90 tablet 0     Sig: TAKE 1 TABLET BY MOUTH DAILY       Last Visit Date (If Applicable):  10/17/2023    Next Visit Date:    5/7/2024

## 2024-05-07 ENCOUNTER — OFFICE VISIT (OUTPATIENT)
Dept: INTERNAL MEDICINE | Age: 69
End: 2024-05-07
Payer: MEDICARE

## 2024-05-07 ENCOUNTER — HOSPITAL ENCOUNTER (OUTPATIENT)
Age: 69
Setting detail: SPECIMEN
Discharge: HOME | End: 2024-05-07

## 2024-05-07 VITALS
OXYGEN SATURATION: 98 % | SYSTOLIC BLOOD PRESSURE: 127 MMHG | HEIGHT: 72 IN | TEMPERATURE: 97.9 F | BODY MASS INDEX: 21.45 KG/M2 | DIASTOLIC BLOOD PRESSURE: 76 MMHG | WEIGHT: 158.4 LBS | HEART RATE: 86 BPM

## 2024-05-07 DIAGNOSIS — I10 ESSENTIAL HYPERTENSION: Primary | ICD-10-CM

## 2024-05-07 DIAGNOSIS — Z98.61 CAD S/P PERCUTANEOUS CORONARY ANGIOPLASTY: ICD-10-CM

## 2024-05-07 DIAGNOSIS — N18.30 STAGE 3 CHRONIC KIDNEY DISEASE, UNSPECIFIED WHETHER STAGE 3A OR 3B CKD (HCC): ICD-10-CM

## 2024-05-07 DIAGNOSIS — I70.213 INTERMITTENT CLAUDICATION OF BOTH LOWER EXTREMITIES DUE TO ATHEROSCLEROSIS (HCC): ICD-10-CM

## 2024-05-07 DIAGNOSIS — I50.22 CHRONIC SYSTOLIC (CONGESTIVE) HEART FAILURE (HCC): ICD-10-CM

## 2024-05-07 DIAGNOSIS — D50.8 IRON DEFICIENCY ANEMIA SECONDARY TO INADEQUATE DIETARY IRON INTAKE: ICD-10-CM

## 2024-05-07 DIAGNOSIS — R73.03 PREDIABETES: ICD-10-CM

## 2024-05-07 DIAGNOSIS — C61 PROSTATE CANCER (HCC): ICD-10-CM

## 2024-05-07 DIAGNOSIS — I25.10 CAD S/P PERCUTANEOUS CORONARY ANGIOPLASTY: ICD-10-CM

## 2024-05-07 DIAGNOSIS — F09 MILD COGNITIVE DISORDER: ICD-10-CM

## 2024-05-07 DIAGNOSIS — I10 ESSENTIAL HYPERTENSION: ICD-10-CM

## 2024-05-07 DIAGNOSIS — J44.9 STAGE 3 SEVERE COPD BY GOLD CLASSIFICATION (HCC): ICD-10-CM

## 2024-05-07 LAB
ALBUMIN SERPL-MCNC: 4.3 G/DL (ref 3.5–5.2)
ALBUMIN/GLOB SERPL: 2 {RATIO} (ref 1–2.5)
ALP SERPL-CCNC: 77 U/L (ref 40–129)
ALT SERPL-CCNC: 12 U/L (ref 10–50)
ANION GAP SERPL CALCULATED.3IONS-SCNC: 12 MMOL/L (ref 9–16)
AST SERPL-CCNC: 17 U/L (ref 10–50)
BILIRUB SERPL-MCNC: 0.2 MG/DL (ref 0–1.2)
BUN SERPL-MCNC: 9 MG/DL (ref 8–23)
CALCIUM SERPL-MCNC: 8.7 MG/DL (ref 8.6–10.4)
CHLORIDE SERPL-SCNC: 102 MMOL/L (ref 98–107)
CHOLEST SERPL-MCNC: 109 MG/DL (ref 0–199)
CHOLESTEROL/HDL RATIO: 3
CO2 SERPL-SCNC: 27 MMOL/L (ref 20–31)
CREAT SERPL-MCNC: 1.3 MG/DL (ref 0.7–1.2)
CREAT UR-MCNC: 194 MG/DL (ref 39–259)
ERYTHROCYTE [DISTWIDTH] IN BLOOD BY AUTOMATED COUNT: 13.8 % (ref 11.8–14.4)
FERRITIN SERPL-MCNC: 43 NG/ML (ref 30–400)
FOLATE SERPL-MCNC: 7 NG/ML (ref 4.8–24.2)
GFR, ESTIMATED: 59 ML/MIN/1.73M2
GLUCOSE SERPL-MCNC: 102 MG/DL (ref 74–99)
HBA1C MFR BLD: 5.8 %
HCT VFR BLD AUTO: 45.8 % (ref 40.7–50.3)
HDLC SERPL-MCNC: 37 MG/DL
HGB BLD-MCNC: 13.9 G/DL (ref 13–17)
IRON SATN MFR SERPL: 35 % (ref 20–55)
IRON SERPL-MCNC: 95 UG/DL (ref 61–157)
LDLC SERPL CALC-MCNC: 58 MG/DL (ref 0–100)
MCH RBC QN AUTO: 28.5 PG (ref 25.2–33.5)
MCHC RBC AUTO-ENTMCNC: 30.3 G/DL (ref 28.4–34.8)
MCV RBC AUTO: 93.9 FL (ref 82.6–102.9)
NRBC BLD-RTO: 0 PER 100 WBC
PLATELET # BLD AUTO: 245 K/UL (ref 138–453)
PMV BLD AUTO: 9.3 FL (ref 8.1–13.5)
POTASSIUM SERPL-SCNC: 4.2 MMOL/L (ref 3.7–5.3)
PROT SERPL-MCNC: 7.1 G/DL (ref 6.6–8.7)
PSA SERPL-MCNC: <0.03 NG/ML (ref 0–4)
RBC # BLD AUTO: 4.88 M/UL (ref 4.21–5.77)
SODIUM SERPL-SCNC: 141 MMOL/L (ref 136–145)
TIBC SERPL-MCNC: 275 UG/DL (ref 250–450)
TOTAL PROTEIN, URINE: 17 MG/DL
TRIGL SERPL-MCNC: 68 MG/DL
TSH SERPL DL<=0.05 MIU/L-ACNC: 2.16 UIU/ML (ref 0.27–4.2)
UNSATURATED IRON BINDING CAPACITY: 180 UG/DL (ref 112–347)
URINE TOTAL PROTEIN CREATININE RATIO: 0.09
VIT B12 SERPL-MCNC: 504 PG/ML (ref 232–1245)
VLDLC SERPL CALC-MCNC: 14 MG/DL
WBC OTHER # BLD: 4.7 K/UL (ref 3.5–11.3)

## 2024-05-07 PROCEDURE — 1036F TOBACCO NON-USER: CPT | Performed by: INTERNAL MEDICINE

## 2024-05-07 PROCEDURE — 3074F SYST BP LT 130 MM HG: CPT | Performed by: INTERNAL MEDICINE

## 2024-05-07 PROCEDURE — 99214 OFFICE O/P EST MOD 30 MIN: CPT | Performed by: INTERNAL MEDICINE

## 2024-05-07 PROCEDURE — G8427 DOCREV CUR MEDS BY ELIG CLIN: HCPCS | Performed by: INTERNAL MEDICINE

## 2024-05-07 PROCEDURE — 1123F ACP DISCUSS/DSCN MKR DOCD: CPT | Performed by: INTERNAL MEDICINE

## 2024-05-07 PROCEDURE — 83036 HEMOGLOBIN GLYCOSYLATED A1C: CPT | Performed by: INTERNAL MEDICINE

## 2024-05-07 PROCEDURE — 3023F SPIROM DOC REV: CPT | Performed by: INTERNAL MEDICINE

## 2024-05-07 PROCEDURE — 99213 OFFICE O/P EST LOW 20 MIN: CPT | Performed by: INTERNAL MEDICINE

## 2024-05-07 PROCEDURE — G8420 CALC BMI NORM PARAMETERS: HCPCS | Performed by: INTERNAL MEDICINE

## 2024-05-07 PROCEDURE — 3078F DIAST BP <80 MM HG: CPT | Performed by: INTERNAL MEDICINE

## 2024-05-07 PROCEDURE — 3017F COLORECTAL CA SCREEN DOC REV: CPT | Performed by: INTERNAL MEDICINE

## 2024-05-07 RX ORDER — ASPIRIN 81 MG/1
81 TABLET, CHEWABLE ORAL DAILY
Qty: 30 TABLET | Refills: 3 | Status: SHIPPED | OUTPATIENT
Start: 2024-05-07

## 2024-05-07 RX ORDER — AMLODIPINE BESYLATE 5 MG/1
5 TABLET ORAL DAILY
Qty: 90 TABLET | Refills: 0 | Status: SHIPPED | OUTPATIENT
Start: 2024-05-07

## 2024-05-07 RX ORDER — CLOPIDOGREL BISULFATE 75 MG/1
75 TABLET ORAL DAILY
Qty: 90 TABLET | Refills: 1 | Status: SHIPPED | OUTPATIENT
Start: 2024-05-07

## 2024-05-07 RX ORDER — ATORVASTATIN CALCIUM 40 MG/1
TABLET, FILM COATED ORAL
Qty: 90 TABLET | Refills: 1 | Status: SHIPPED | OUTPATIENT
Start: 2024-05-07

## 2024-05-07 RX ORDER — ISOSORBIDE MONONITRATE 30 MG/1
30 TABLET, EXTENDED RELEASE ORAL EVERY MORNING
Qty: 90 TABLET | Refills: 2 | Status: SHIPPED | OUTPATIENT
Start: 2024-05-07

## 2024-05-07 RX ORDER — RANOLAZINE 500 MG/1
500 TABLET, EXTENDED RELEASE ORAL 2 TIMES DAILY
Qty: 180 TABLET | Refills: 0 | Status: SHIPPED | OUTPATIENT
Start: 2024-05-07

## 2024-05-07 RX ORDER — SENNA AND DOCUSATE SODIUM 50; 8.6 MG/1; MG/1
1 TABLET, FILM COATED ORAL DAILY
Qty: 30 TABLET | Refills: 0 | Status: SHIPPED | OUTPATIENT
Start: 2024-05-07

## 2024-05-07 RX ORDER — FLUTICASONE FUROATE, UMECLIDINIUM BROMIDE AND VILANTEROL TRIFENATATE 200; 62.5; 25 UG/1; UG/1; UG/1
1 POWDER RESPIRATORY (INHALATION) DAILY
Qty: 60 EACH | Refills: 5 | Status: SHIPPED | OUTPATIENT
Start: 2024-05-07

## 2024-05-07 ASSESSMENT — ENCOUNTER SYMPTOMS
SHORTNESS OF BREATH: 0
WHEEZING: 0
COUGH: 1
BLOOD IN STOOL: 0
ABDOMINAL DISTENTION: 0
CONSTIPATION: 1
NAUSEA: 0
ABDOMINAL PAIN: 0

## 2024-05-07 NOTE — PATIENT INSTRUCTIONS
Patient was put on a 4 month wait list and will be contacted to schedule their next follow up appointment once the schedule is available. If the patient is in need of an appointment before their next visit please call the office at [phone]. After Visit Summary  given and reviewed.    SG

## 2024-05-07 NOTE — PROGRESS NOTES
[facility]    Progress Note    Date of patient's visit: 5/7/2024    Patient's Name:  Jason Boothe    YOB: 1955            Patient Care Team:  Adeline Ignacio MD as PCP - General (Internal Medicine)  Adeline Ignacio MD as PCP - Empaneled Provider  Debbi Simmons APRN - CNP as Nurse Practitioner (Cardiology)  Nena Moreno RN as   Martinez Grady MD as Consulting Physician (Gastroenterology)    REASON FOR VISIT: Routine outpatient follow     Chief Complaint   Patient presents with    Hypertension     Follow up    Pre-op Exam     For colonoscopy          HISTORY OF PRESENT ILLNESS:    History was obtained from the patient. Jason Boothe is a 68 y.o. is here for follow-up on his chronic medical problems.  He has not been in the office in some time to see me.  He denies any complaints.  He has not seen any of his specialist including cardiology, urology, GI or hematology in more than a year.  He cannot remember his last cardiac visit.  He denies chest pain.  He denies any current shortness of breath or cough.  He quit smoking in 2018.  Last low-dose CT was in 2023.  He denies hematuria.  Last PSA was done 2 or 3 years ago.  He denies any trouble urinating.  He has problems with constipation and bloating.  He is to be scheduled for a colonoscopy for screening but needed a clearance today as he is on aspirin and Plavix.  He has a history of CAD with stents in the past.  He denies any blood in his stools.      Results for POC orders placed in visit on 05/07/24   POCT glycosylated hemoglobin (Hb A1C)   Result Value Ref Range    Hemoglobin A1C 5.8 %         Past Medical History:   Diagnosis Date    Alcohol withdrawal seizure (HCC) 1991    quit ETOH since 1991    Blood loss anemia 2008    transfusion from chronic plavix and asa use    CAD (coronary artery disease) 2006    angioplast with drug eluting stent to l circumflex     Cardiomyopathy (HCC)

## 2024-05-17 ENCOUNTER — HOSPITAL ENCOUNTER (OUTPATIENT)
Age: 69
End: 2024-05-17
Payer: MEDICARE

## 2024-05-17 DIAGNOSIS — R06.02 SHORTNESS OF BREATH: ICD-10-CM

## 2024-05-17 DIAGNOSIS — I50.9 CONGESTIVE HEART FAILURE, UNSPECIFIED HF CHRONICITY, UNSPECIFIED HEART FAILURE TYPE (HCC): ICD-10-CM

## 2024-05-17 LAB
ECHO AO ROOT DIAM: 2.3 CM
ECHO AV AREA PEAK VELOCITY: 2 CM2
ECHO AV AREA VTI: 1.7 CM2
ECHO AV MEAN GRADIENT: 2 MMHG
ECHO AV MEAN VELOCITY: 0.7 M/S
ECHO AV PEAK GRADIENT: 4 MMHG
ECHO AV PEAK VELOCITY: 1 M/S
ECHO AV VELOCITY RATIO: 0.7
ECHO AV VTI: 19.6 CM
ECHO LA AREA 2C: 13.9 CM2
ECHO LA AREA 4C: 8.3 CM2
ECHO LA DIAMETER: 2.4 CM
ECHO LA MAJOR AXIS: 3.4 CM
ECHO LA MINOR AXIS: 4.5 CM
ECHO LA TO AORTIC ROOT RATIO: 1.04
ECHO LA VOL MOD A2C: 31 ML (ref 18–58)
ECHO LA VOL MOD A4C: 16 ML (ref 18–58)
ECHO LV E' LATERAL VELOCITY: 8 CM/S
ECHO LV E' SEPTAL VELOCITY: 6 CM/S
ECHO LV EDV A2C: 38 ML
ECHO LV EDV A4C: 31 ML
ECHO LV EJECTION FRACTION A2C: 62 %
ECHO LV EJECTION FRACTION A4C: 55 %
ECHO LV EJECTION FRACTION BIPLANE: 59 % (ref 55–100)
ECHO LV ESV A2C: 14 ML
ECHO LV ESV A4C: 14 ML
ECHO LV FRACTIONAL SHORTENING: 10 % (ref 28–44)
ECHO LV INTERNAL DIMENSION DIASTOLIC: 4.8 CM (ref 4.2–5.9)
ECHO LV INTERNAL DIMENSION SYSTOLIC: 4.3 CM
ECHO LV IVSD: 0.7 CM (ref 0.6–1)
ECHO LV MASS 2D: 116.6 G (ref 88–224)
ECHO LV POSTERIOR WALL DIASTOLIC: 0.8 CM (ref 0.6–1)
ECHO LV RELATIVE WALL THICKNESS RATIO: 0.33
ECHO LVOT AREA: 3.1 CM2
ECHO LVOT AV VTI INDEX: 0.54
ECHO LVOT DIAM: 2 CM
ECHO LVOT MEAN GRADIENT: 1 MMHG
ECHO LVOT PEAK GRADIENT: 2 MMHG
ECHO LVOT PEAK VELOCITY: 0.7 M/S
ECHO LVOT SV: 33.3 ML
ECHO LVOT VTI: 10.6 CM
ECHO MV A VELOCITY: 0.99 M/S
ECHO MV AREA VTI: 1.4 CM2
ECHO MV E DECELERATION TIME (DT): 201 MS
ECHO MV E VELOCITY: 0.77 M/S
ECHO MV E/A RATIO: 0.78
ECHO MV E/E' LATERAL: 9.63
ECHO MV E/E' RATIO (AVERAGED): 11.23
ECHO MV E/E' SEPTAL: 12.83
ECHO MV LVOT VTI INDEX: 2.18
ECHO MV MAX VELOCITY: 0.8 M/S
ECHO MV MEAN GRADIENT: 1 MMHG
ECHO MV MEAN VELOCITY: 0.5 M/S
ECHO MV PEAK GRADIENT: 3 MMHG
ECHO MV VTI: 23.1 CM
ECHO PV MAX VELOCITY: 0.9 M/S
ECHO PV PEAK GRADIENT: 3 MMHG
ECHO RA AREA 4C: 8.1 CM2
ECHO RA VOLUME: 15 ML
ECHO RV BASAL DIMENSION: 3.1 CM
ECHO RV FREE WALL PEAK S': 9 CM/S
ECHO RV MID DIMENSION: 2.6 CM
ECHO RV TAPSE: 1.8 CM (ref 1.7–?)

## 2024-05-17 PROCEDURE — 93306 TTE W/DOPPLER COMPLETE: CPT | Performed by: INTERNAL MEDICINE

## 2024-05-17 PROCEDURE — 93306 TTE W/DOPPLER COMPLETE: CPT

## 2024-05-30 ENCOUNTER — PATIENT MESSAGE (OUTPATIENT)
Dept: INTERNAL MEDICINE CLINIC | Age: 69
End: 2024-05-30

## 2024-05-31 NOTE — TELEPHONE ENCOUNTER
Patient instructed on how to prepare and drink bowel prep. He is scheduled for colonoscopy on 6/14/24    Electronically signed by Hernando Soriano MD on 5/30/2024 at 8:25 PM    Hernando Soriano MD  Internal Medicine Resident, PGY- 2  [facility],  [place], [place].  8:25 PM     This note is created with the assistance of a speech-recognition program. While intending to generate a document that actually reflects the content of the visit, no guarantees can be provided that every mistake has been identified and corrected by editing.

## 2024-06-14 ENCOUNTER — ANESTHESIA EVENT (OUTPATIENT)
Dept: OPERATING ROOM | Age: 69
End: 2024-06-14
Payer: MEDICARE

## 2024-06-14 ENCOUNTER — HOSPITAL ENCOUNTER (OUTPATIENT)
Age: 69
Setting detail: OUTPATIENT SURGERY
Discharge: HOME | End: 2024-06-14
Attending: INTERNAL MEDICINE | Admitting: INTERNAL MEDICINE
Payer: MEDICARE

## 2024-06-14 ENCOUNTER — ANESTHESIA (OUTPATIENT)
Dept: OPERATING ROOM | Age: 69
End: 2024-06-14
Payer: MEDICARE

## 2024-06-14 VITALS
RESPIRATION RATE: 18 BRPM | DIASTOLIC BLOOD PRESSURE: 92 MMHG | SYSTOLIC BLOOD PRESSURE: 154 MMHG | OXYGEN SATURATION: 96 % | TEMPERATURE: 97.7 F | HEART RATE: 76 BPM

## 2024-06-14 DIAGNOSIS — Z12.11 COLON CANCER SCREENING: ICD-10-CM

## 2024-06-14 LAB
GLUCOSE BLD-MCNC: 111 MG/DL (ref 75–110)
GLUCOSE BLD-MCNC: 77 MG/DL (ref 75–110)
GLUCOSE BLD-MCNC: 78 MG/DL (ref 75–110)

## 2024-06-14 PROCEDURE — 2580000003 HC RX 258

## 2024-06-14 PROCEDURE — 6360000002 HC RX W HCPCS

## 2024-06-14 PROCEDURE — 3609010600 HC COLONOSCOPY POLYPECTOMY SNARE/COLD BIOPSY: Performed by: INTERNAL MEDICINE

## 2024-06-14 PROCEDURE — 7100000010 HC PHASE II RECOVERY - FIRST 15 MIN: Performed by: INTERNAL MEDICINE

## 2024-06-14 PROCEDURE — 3700000000 HC ANESTHESIA ATTENDED CARE: Performed by: INTERNAL MEDICINE

## 2024-06-14 PROCEDURE — 88305 TISSUE EXAM BY PATHOLOGIST: CPT

## 2024-06-14 PROCEDURE — 2709999900 HC NON-CHARGEABLE SUPPLY: Performed by: INTERNAL MEDICINE

## 2024-06-14 PROCEDURE — 3700000001 HC ADD 15 MINUTES (ANESTHESIA): Performed by: INTERNAL MEDICINE

## 2024-06-14 PROCEDURE — 7100000011 HC PHASE II RECOVERY - ADDTL 15 MIN: Performed by: INTERNAL MEDICINE

## 2024-06-14 PROCEDURE — 2500000003 HC RX 250 WO HCPCS

## 2024-06-14 PROCEDURE — 82947 ASSAY GLUCOSE BLOOD QUANT: CPT

## 2024-06-14 RX ORDER — LABETALOL HYDROCHLORIDE 5 MG/ML
10 INJECTION, SOLUTION INTRAVENOUS
Status: DISCONTINUED | OUTPATIENT
Start: 2024-06-14 | End: 2024-06-14 | Stop reason: HOSPADM

## 2024-06-14 RX ORDER — OXYCODONE HYDROCHLORIDE 5 MG/1
10 TABLET ORAL PRN
Status: DISCONTINUED | OUTPATIENT
Start: 2024-06-14 | End: 2024-06-14 | Stop reason: HOSPADM

## 2024-06-14 RX ORDER — SODIUM CHLORIDE 0.9 % (FLUSH) 0.9 %
5-40 SYRINGE (ML) INJECTION PRN
Status: DISCONTINUED | OUTPATIENT
Start: 2024-06-14 | End: 2024-06-14 | Stop reason: HOSPADM

## 2024-06-14 RX ORDER — SODIUM CHLORIDE 0.9 % (FLUSH) 0.9 %
5-40 SYRINGE (ML) INJECTION EVERY 12 HOURS SCHEDULED
Status: DISCONTINUED | OUTPATIENT
Start: 2024-06-14 | End: 2024-06-14 | Stop reason: HOSPADM

## 2024-06-14 RX ORDER — DROPERIDOL 2.5 MG/ML
0.62 INJECTION, SOLUTION INTRAMUSCULAR; INTRAVENOUS
Status: DISCONTINUED | OUTPATIENT
Start: 2024-06-14 | End: 2024-06-14 | Stop reason: HOSPADM

## 2024-06-14 RX ORDER — OXYCODONE HYDROCHLORIDE 5 MG/1
5 TABLET ORAL PRN
Status: DISCONTINUED | OUTPATIENT
Start: 2024-06-14 | End: 2024-06-14 | Stop reason: HOSPADM

## 2024-06-14 RX ORDER — SODIUM CHLORIDE, SODIUM LACTATE, POTASSIUM CHLORIDE, CALCIUM CHLORIDE 600; 310; 30; 20 MG/100ML; MG/100ML; MG/100ML; MG/100ML
INJECTION, SOLUTION INTRAVENOUS CONTINUOUS PRN
Status: DISCONTINUED | OUTPATIENT
Start: 2024-06-14 | End: 2024-06-14 | Stop reason: SDUPTHER

## 2024-06-14 RX ORDER — NALOXONE HYDROCHLORIDE 0.4 MG/ML
INJECTION, SOLUTION INTRAMUSCULAR; INTRAVENOUS; SUBCUTANEOUS PRN
Status: DISCONTINUED | OUTPATIENT
Start: 2024-06-14 | End: 2024-06-14 | Stop reason: HOSPADM

## 2024-06-14 RX ORDER — PROPOFOL 10 MG/ML
INJECTION, EMULSION INTRAVENOUS CONTINUOUS PRN
Status: DISCONTINUED | OUTPATIENT
Start: 2024-06-14 | End: 2024-06-14 | Stop reason: SDUPTHER

## 2024-06-14 RX ORDER — SODIUM CHLORIDE 9 MG/ML
INJECTION, SOLUTION INTRAVENOUS PRN
Status: DISCONTINUED | OUTPATIENT
Start: 2024-06-14 | End: 2024-06-14 | Stop reason: HOSPADM

## 2024-06-14 RX ORDER — HYDRALAZINE HYDROCHLORIDE 20 MG/ML
10 INJECTION INTRAMUSCULAR; INTRAVENOUS
Status: DISCONTINUED | OUTPATIENT
Start: 2024-06-14 | End: 2024-06-14 | Stop reason: HOSPADM

## 2024-06-14 RX ORDER — FENTANYL CITRATE 50 UG/ML
25 INJECTION, SOLUTION INTRAMUSCULAR; INTRAVENOUS EVERY 5 MIN PRN
Status: DISCONTINUED | OUTPATIENT
Start: 2024-06-14 | End: 2024-06-14 | Stop reason: HOSPADM

## 2024-06-14 RX ORDER — DIPHENHYDRAMINE HYDROCHLORIDE 50 MG/ML
12.5 INJECTION INTRAMUSCULAR; INTRAVENOUS
Status: DISCONTINUED | OUTPATIENT
Start: 2024-06-14 | End: 2024-06-14 | Stop reason: HOSPADM

## 2024-06-14 RX ORDER — LORAZEPAM 2 MG/ML
0.5 INJECTION INTRAMUSCULAR
Status: DISCONTINUED | OUTPATIENT
Start: 2024-06-14 | End: 2024-06-14 | Stop reason: HOSPADM

## 2024-06-14 RX ORDER — LIDOCAINE HYDROCHLORIDE 10 MG/ML
INJECTION, SOLUTION EPIDURAL; INFILTRATION; INTRACAUDAL; PERINEURAL PRN
Status: DISCONTINUED | OUTPATIENT
Start: 2024-06-14 | End: 2024-06-14 | Stop reason: SDUPTHER

## 2024-06-14 RX ORDER — PROCHLORPERAZINE EDISYLATE 5 MG/ML
5 INJECTION INTRAMUSCULAR; INTRAVENOUS
Status: DISCONTINUED | OUTPATIENT
Start: 2024-06-14 | End: 2024-06-14 | Stop reason: HOSPADM

## 2024-06-14 RX ADMIN — SODIUM CHLORIDE, POTASSIUM CHLORIDE, SODIUM LACTATE AND CALCIUM CHLORIDE: 600; 310; 30; 20 INJECTION, SOLUTION INTRAVENOUS at 12:41

## 2024-06-14 RX ADMIN — PROPOFOL 130 MCG/KG/MIN: 10 INJECTION, EMULSION INTRAVENOUS at 12:53

## 2024-06-14 RX ADMIN — PROPOFOL 225 MCG/KG/MIN: 10 INJECTION, EMULSION INTRAVENOUS at 13:13

## 2024-06-14 RX ADMIN — LIDOCAINE HYDROCHLORIDE 50 MG: 10 INJECTION, SOLUTION EPIDURAL; INFILTRATION; INTRACAUDAL; PERINEURAL at 12:46

## 2024-06-14 ASSESSMENT — PAIN - FUNCTIONAL ASSESSMENT: PAIN_FUNCTIONAL_ASSESSMENT: 0-10

## 2024-06-14 ASSESSMENT — COPD QUESTIONNAIRES: CAT_SEVERITY: MODERATE

## 2024-06-14 NOTE — ANESTHESIA PRE PROCEDURE
Department of Anesthesiology  Preprocedure Note       Name:  Jason Boothe   Age:  68 y.o.  :  1955                                          MRN:  [id number]         Date:  2024      Surgeon: Surgeon(s):  Everett Renee MD    Procedure: Procedure(s):  COLORECTAL CANCER SCREENING, NOT HIGH RISK    Medications prior to admission:   Prior to Admission medications    Medication Sig Start Date End Date Taking? Authorizing Provider   polyethylene glycol (MIRALAX) 17 GM/SCOOP powder Take per bowel prep instructions 24   Everett Renee MD   bisacodyl 5 MG EC tablet Take as directed for bowel prep/colonoscopy 24   Everett Renee MD   amLODIPine (NORVASC) 5 MG tablet Take 1 tablet by mouth daily 24   Adeline Ignacio MD   aspirin 81 MG chewable tablet Take 1 tablet by mouth daily 24   Adeline Ignacio MD   atorvastatin (LIPITOR) 40 MG tablet TAKE 1 TABLET BY MOUTH DAILY 24   Adeline Ignacio MD   clopidogrel (PLAVIX) 75 MG tablet Take 1 tablet by mouth daily 24   Adeline Ignacio MD   fluticasone-umeclidin-vilant (TRELEGY ELLIPTA) 200-62.5-25 MCG/ACT AEPB inhaler Inhale 1 puff into the lungs daily 24   Adeline Ignacio MD   isosorbide mononitrate (IMDUR) 30 MG extended release tablet Take 1 tablet by mouth every morning 24   Adeline Ignacio MD   metoprolol tartrate (LOPRESSOR) 25 MG tablet Take 1 tablet by mouth 2 times daily 24   Adeline Ignacio MD   ranolazine (RANEXA) 500 MG extended release tablet Take 1 tablet by mouth 2 times daily PLEASE CALL OFFICE TO SCHEDULE TO AN APT FOR FURTHER REFILLS 24   Adeline Ignacio MD   sennosides-docusate sodium (SENOKOT-S) 8.6-50 MG tablet Take 1 tablet by mouth daily 24   Adeline Ignacio MD   albuterol sulfate HFA (PROVENTIL;VENTOLIN;PROAIR) 108 (90 Base) MCG/ACT inhaler INHALE 2 PUFFS INTO THE LUNGS EVERY 6 HOURS AS NEEDED FOR WHEEZING 3/8/24   Adeline Ignacio MD   docusate sodium (STOOL

## 2024-06-14 NOTE — ANESTHESIA POSTPROCEDURE EVALUATION
Department of Anesthesiology  Postprocedure Note    Patient: Jason Boothe  MRN: [id number]  YOB: 1955  Date of evaluation: 6/14/2024    Procedure Summary       Date: 06/14/24 Room / Location: [address]    Anesthesia Start: 1241 Anesthesia Stop: 1332    Procedure: COLONOSCOPY POLYPECTOMY SNARE/BIOPSY Diagnosis:       Colon cancer screening      (Colon cancer screening [Z12.11])    Surgeons: Everett Renee MD Responsible Provider: Ronnie Henson MD    Anesthesia Type: MAC, TIVA ASA Status: 3            Anesthesia Type: No value filed.    Maria Teresa Phase I: Maria Teresa Score: 10    Maria Teresa Phase II: Maria Teresa Score: 10    Anesthesia Post Evaluation    Patient location during evaluation: PACU  Patient participation: complete - patient participated  Level of consciousness: awake and alert  Airway patency: patent  Nausea & Vomiting: no nausea and no vomiting  Cardiovascular status: blood pressure returned to baseline  Respiratory status: acceptable  Hydration status: euvolemic  Pain management: adequate    No notable events documented.

## 2024-06-14 NOTE — OP NOTE
Operative Note      Patient: Jason Boothe  YOB: 1955  MRN: [id number]    Date of Procedure: 6/14/2024    Pre-Op Diagnosis Codes:     * Colon cancer screening [Z12.11]    Post-Op Diagnosis:  Transverse and sigmoid colon polyps, internal hemorrhoids.       Procedure(s):  COLONOSCOPY POLYPECTOMY SNARE/BIOPSY    Surgeon(s):  Everett Renee MD    Assistant:   * No surgical staff found *    Anesthesia: Monitor Anesthesia Care    Estimated Blood Loss (mL): Minimal    Complications: None    Specimens:   ID Type Source Tests Collected by Time Destination   A : Sigmoid Colon Polyps Tissue Sigmoid Colon SURGICAL PATHOLOGY Everett Renee MD 6/14/2024 1301    B : Transverse Colon Polyps Tissue Colon-Transverse SURGICAL PATHOLOGY Everett Renee MD 6/14/2024 1308        Implants:  * No implants in log *      Drains: * No LDAs found *    Findings:  Infection Present At Time Of Surgery (PATOS) (choose all levels that have infection present):  No infection present      Scope withdrawal time:19 min     Description of Procedure:  Informed consent was obtained from the patient after explanation of the procedure including indications, description of the procedure,  benefits and possible risks and complications of the procedure, and alternatives. Questions were answered.  The patient's history was reviewed and a directed physical examination was performed prior to the procedure.    Patient was monitored throughout the procedure with pulse oximetry and periodic assessment of vital signs. Patient was sedated as noted above. With the patient initially in the left lateral decubitus position, a digital rectal examination was performed and revealed negative without mass, lesions or tenderness.  The Olympus video colonoscope was placed in the patient's rectum and advanced without difficulty  to the cecum, which was identified by the ileocecal valve and appendiceal orifice.  The prep was good.  Examination of  the mucosa was performed during both introduction and withdrawal of the colonoscope. Retroflexed view of the rectum was performed.  The patient  was taken to the recovery area in good condition.     Findings:     1.  5 sessile polyps were seen in the transverse colon.  Polyps in size from 8 to 10 mm.  Cold snare polypectomies were done.  Resection retrieval was complete.  2.  2 sessile polyps were seen in the sigmoid colon.  Polyps in size from 10 to 5 mm.  Cold snare and cold forcep polypectomy was done.  Resection retrieval was complete  3.  Internal hemorrhoids were seen retroflexion view.     Recommendations: Follow-up with the biopsy results.                                     Repeat colonoscopy in 3 years                                     Follow-up with PCP    Everett Renee MD    Electronically signed by Everett Renee MD on 6/14/2024 at 1:25 PM

## 2024-06-14 NOTE — ANESTHESIA PRE PROCEDURE
Department of Anesthesiology  Preprocedure Note       Name:  Jason Boothe   Age:  68 y.o.  :  1955                                          MRN:  [id number]         Date:  2024      Surgeon: Surgeon(s):  Everett Renee MD    Procedure: Procedure(s):  COLORECTAL CANCER SCREENING, NOT HIGH RISK    Medications prior to admission:   Prior to Admission medications    Medication Sig Start Date End Date Taking? Authorizing Provider   polyethylene glycol (MIRALAX) 17 GM/SCOOP powder Take per bowel prep instructions 24   Everett Renee MD   bisacodyl 5 MG EC tablet Take as directed for bowel prep/colonoscopy 24   Everett Renee MD   amLODIPine (NORVASC) 5 MG tablet Take 1 tablet by mouth daily 24   Adeline Ignacio MD   aspirin 81 MG chewable tablet Take 1 tablet by mouth daily 24   Adeline Ignacio MD   atorvastatin (LIPITOR) 40 MG tablet TAKE 1 TABLET BY MOUTH DAILY 24   Adeline Ignacio MD   clopidogrel (PLAVIX) 75 MG tablet Take 1 tablet by mouth daily 24   Adeline Ignacio MD   fluticasone-umeclidin-vilant (TRELEGY ELLIPTA) 200-62.5-25 MCG/ACT AEPB inhaler Inhale 1 puff into the lungs daily 24   Adeline Ignacio MD   isosorbide mononitrate (IMDUR) 30 MG extended release tablet Take 1 tablet by mouth every morning 24   Adeline Ignacio MD   metoprolol tartrate (LOPRESSOR) 25 MG tablet Take 1 tablet by mouth 2 times daily 24   Adeline Ignacio MD   ranolazine (RANEXA) 500 MG extended release tablet Take 1 tablet by mouth 2 times daily PLEASE CALL OFFICE TO SCHEDULE TO AN APT FOR FURTHER REFILLS 24   Adeline Ignacio MD   sennosides-docusate sodium (SENOKOT-S) 8.6-50 MG tablet Take 1 tablet by mouth daily 24   Adeline Ignacio MD   albuterol sulfate HFA (PROVENTIL;VENTOLIN;PROAIR) 108 (90 Base) MCG/ACT inhaler INHALE 2 PUFFS INTO THE LUNGS EVERY 6 HOURS AS NEEDED FOR WHEEZING 3/8/24   Adeline Ignacio MD   docusate sodium (STOOL

## 2024-06-14 NOTE — DISCHARGE INSTRUCTIONS
[facility]    POST-ENDOSCOPY INSTRUCTIONS    1. ACTIVITY   No driving, operating machinery, or making important decisions for 24 hours.    Resume normal activity after 24 hours.  You may return to work after 24 hours.    2. DIET        Resume your usual diet unless specified below.   ***    3. MEDICATIONS    Resume your usual medications.     Restart aspirin and Plavix from tomorrow    Do not consume alcohol, tranquilizers, or sleeping medications for 24 hour unless advised by your physician.                 4. PHYSICIAN FOLLOW-UP / INSTRUCTIONS    Please call the office/clinic in 10 days for biopsy results:      [  ] GI office:  [phone]          Follow up with your family physician as planned.    6. NORMAL CHANGES YOU MAY EXPERIENCE AFTER ENDOSCOPY:        COLONOSCOPY    Passing of gas for several hours.    Some mild abdominal cramping.        You may feel fatigued for the next 24-48   hours due to the prep and sedation    7. CALL YOUR PHYSICIAN IF YOU EXPERIENCE ANY OF THE FOLLOWING      A.  Passing blood rectally or vomiting blood (color may be red or black)      B.  Severe abdominal pain or tenderness (that is not relieved by passing air)      C.  Fever, chills, or excessive sweating      D.  Persistent nausea or vomiting      E.  Redness or swelling at the IV site    If you have additional questions, PLEASE call your doctor or the [facility] GI Unit ([phone])

## 2024-06-14 NOTE — H&P
History and Physical    Pt Name: Jason Boothe  MRN: [id number]  YOB: 1955  Date of evaluation: 6/14/2024  Primary Care Physician: Adeline Ignacio MD    SUBJECTIVE:   History of Chief Complaint:    Jason Boothe is a 68 y.o. male who is scheduled today for COLORECTAL CANCER SCREENING, NOT HIGH RISK. Patient reports he has been having constipation intermittently for months. He denies any diarrhea or rectal bleeding. He adds he has been having intermittent, sharp abdominal pain to the right lower quadrant for the last month as well. He denies any previous colonoscopy and further denies any family history of colon cancer.   Allergies  is allergic to phenytoin sodium extended.  Medications  Prior to Admission medications    Medication Sig Start Date End Date Taking? Authorizing Provider   polyethylene glycol (MIRALAX) 17 GM/SCOOP powder Take per bowel prep instructions 5/14/24   Everett Renee MD   bisacodyl 5 MG EC tablet Take as directed for bowel prep/colonoscopy 5/14/24   Everett Renee MD   amLODIPine (NORVASC) 5 MG tablet Take 1 tablet by mouth daily 5/7/24   Adeline Ignacio MD   aspirin 81 MG chewable tablet Take 1 tablet by mouth daily 5/7/24   Adeline Ignacio MD   atorvastatin (LIPITOR) 40 MG tablet TAKE 1 TABLET BY MOUTH DAILY 5/7/24   Adeline Ignacio MD   clopidogrel (PLAVIX) 75 MG tablet Take 1 tablet by mouth daily 5/7/24   Adeline Ignacio MD   fluticasone-umeclidin-vilant (TRELEGY ELLIPTA) 200-62.5-25 MCG/ACT AEPB inhaler Inhale 1 puff into the lungs daily 5/7/24   Adeline Ignacio MD   isosorbide mononitrate (IMDUR) 30 MG extended release tablet Take 1 tablet by mouth every morning 5/7/24   Adeline Ignacio MD   metoprolol tartrate (LOPRESSOR) 25 MG tablet Take 1 tablet by mouth 2 times daily 5/7/24   Adeline Ignacio MD   ranolazine (RANEXA) 500 MG extended release tablet Take 1 tablet by mouth 2 times daily PLEASE CALL OFFICE TO SCHEDULE TO AN APT FOR FURTHER REFILLS  murmurs/rubs/gallops.   ABDOMEN: soft, non-tender and non-distended, bowel sounds active x 4.   EXTREMITIES: No edema to bilateral lower extremities.  No varicosities to bilateral lower extremities.   NEUROLOGIC: CN II-XII are grossly intact. Gait not assessed.    IMPRESSIONS:       Colon cancer screening     PLANS:   COLORECTAL CANCER SCREENING, NOT HIGH RISK.    LINO Philip CNP   Electronically signed 6/14/2024 at 12:35 PM

## 2024-06-14 NOTE — PROGRESS NOTES
Discharge instructions discussed with patient and sister, all questions and concerns were answered, verbalizes understanding. All belongings given to patient. Discharge per wheelchair in a stable condition.

## 2024-06-17 LAB — SURGICAL PATHOLOGY REPORT: NORMAL

## 2024-06-18 ENCOUNTER — TELEPHONE (OUTPATIENT)
Dept: GASTROENTEROLOGY | Age: 69
End: 2024-06-18

## 2024-06-18 ENCOUNTER — TELEPHONE (OUTPATIENT)
Dept: PHARMACY | Facility: CLINIC | Age: 69
End: 2024-06-18

## 2024-06-18 NOTE — TELEPHONE ENCOUNTER
Patient notified and voicemail left in regards to Polyps removed at the time of your colonoscopy are consistent with tubular adenoma.  We will repeating your colonoscopy in 3 years.  Follow-up with PCP. Please advise.

## 2024-06-21 DIAGNOSIS — D50.8 IRON DEFICIENCY ANEMIA SECONDARY TO INADEQUATE DIETARY IRON INTAKE: Primary | ICD-10-CM

## 2024-06-25 ENCOUNTER — TELEPHONE (OUTPATIENT)
Dept: ONCOLOGY | Age: 69
End: 2024-06-25

## 2024-06-25 ENCOUNTER — HOSPITAL ENCOUNTER (OUTPATIENT)
Age: 69
Setting detail: SPECIMEN
Discharge: HOME | End: 2024-06-25
Payer: MEDICARE

## 2024-06-25 ENCOUNTER — OFFICE VISIT (OUTPATIENT)
Dept: ONCOLOGY | Age: 69
End: 2024-06-25
Payer: MEDICARE

## 2024-06-25 VITALS
TEMPERATURE: 97.5 F | DIASTOLIC BLOOD PRESSURE: 75 MMHG | RESPIRATION RATE: 16 BRPM | WEIGHT: 154.2 LBS | BODY MASS INDEX: 20.91 KG/M2 | SYSTOLIC BLOOD PRESSURE: 115 MMHG | HEART RATE: 75 BPM

## 2024-06-25 DIAGNOSIS — D50.8 IRON DEFICIENCY ANEMIA SECONDARY TO INADEQUATE DIETARY IRON INTAKE: ICD-10-CM

## 2024-06-25 DIAGNOSIS — D50.8 IRON DEFICIENCY ANEMIA SECONDARY TO INADEQUATE DIETARY IRON INTAKE: Primary | ICD-10-CM

## 2024-06-25 DIAGNOSIS — Z87.891 PERSONAL HISTORY OF NICOTINE DEPENDENCE: ICD-10-CM

## 2024-06-25 DIAGNOSIS — K90.89 OTHER SPECIFIED INTESTINAL MALABSORPTION: ICD-10-CM

## 2024-06-25 LAB
BASOPHILS # BLD: 0.03 K/UL (ref 0–0.2)
BASOPHILS NFR BLD: 1 % (ref 0–2)
EOSINOPHIL # BLD: 0.19 K/UL (ref 0–0.44)
EOSINOPHILS RELATIVE PERCENT: 4 % (ref 1–4)
ERYTHROCYTE [DISTWIDTH] IN BLOOD BY AUTOMATED COUNT: 13.1 % (ref 11.8–14.4)
FERRITIN SERPL-MCNC: 59 NG/ML (ref 30–400)
HCT VFR BLD AUTO: 44.1 % (ref 40.7–50.3)
HGB BLD-MCNC: 13.6 G/DL (ref 13–17)
IMM GRANULOCYTES # BLD AUTO: 0.02 K/UL (ref 0–0.3)
IMM GRANULOCYTES NFR BLD: 0 %
IRON SATN MFR SERPL: 26 % (ref 20–55)
IRON SERPL-MCNC: 71 UG/DL (ref 61–157)
LYMPHOCYTES NFR BLD: 1.56 K/UL (ref 1.1–3.7)
LYMPHOCYTES RELATIVE PERCENT: 35 % (ref 24–43)
MCH RBC QN AUTO: 28.5 PG (ref 25.2–33.5)
MCHC RBC AUTO-ENTMCNC: 30.8 G/DL (ref 28.4–34.8)
MCV RBC AUTO: 92.3 FL (ref 82.6–102.9)
MONOCYTES NFR BLD: 0.44 K/UL (ref 0.1–1.2)
MONOCYTES NFR BLD: 10 % (ref 3–12)
NEUTROPHILS NFR BLD: 50 % (ref 36–65)
NEUTS SEG NFR BLD: 2.28 K/UL (ref 1.5–8.1)
NRBC BLD-RTO: 0 PER 100 WBC
PLATELET # BLD AUTO: 292 K/UL (ref 138–453)
PMV BLD AUTO: 8.9 FL (ref 8.1–13.5)
RBC # BLD AUTO: 4.78 M/UL (ref 4.21–5.77)
TIBC SERPL-MCNC: 272 UG/DL (ref 250–450)
UNSATURATED IRON BINDING CAPACITY: 201 UG/DL (ref 112–347)
WBC OTHER # BLD: 4.5 K/UL (ref 3.5–11.3)

## 2024-06-25 PROCEDURE — G8427 DOCREV CUR MEDS BY ELIG CLIN: HCPCS | Performed by: INTERNAL MEDICINE

## 2024-06-25 PROCEDURE — 1036F TOBACCO NON-USER: CPT | Performed by: INTERNAL MEDICINE

## 2024-06-25 PROCEDURE — 82728 ASSAY OF FERRITIN: CPT

## 2024-06-25 PROCEDURE — 85025 COMPLETE CBC W/AUTO DIFF WBC: CPT

## 2024-06-25 PROCEDURE — 3017F COLORECTAL CA SCREEN DOC REV: CPT | Performed by: INTERNAL MEDICINE

## 2024-06-25 PROCEDURE — 36415 COLL VENOUS BLD VENIPUNCTURE: CPT

## 2024-06-25 PROCEDURE — 3078F DIAST BP <80 MM HG: CPT | Performed by: INTERNAL MEDICINE

## 2024-06-25 PROCEDURE — 83540 ASSAY OF IRON: CPT

## 2024-06-25 PROCEDURE — 3074F SYST BP LT 130 MM HG: CPT | Performed by: INTERNAL MEDICINE

## 2024-06-25 PROCEDURE — 99211 OFF/OP EST MAY X REQ PHY/QHP: CPT

## 2024-06-25 PROCEDURE — 99214 OFFICE O/P EST MOD 30 MIN: CPT | Performed by: INTERNAL MEDICINE

## 2024-06-25 PROCEDURE — 83550 IRON BINDING TEST: CPT

## 2024-06-25 PROCEDURE — 1123F ACP DISCUSS/DSCN MKR DOCD: CPT | Performed by: INTERNAL MEDICINE

## 2024-06-25 PROCEDURE — G8420 CALC BMI NORM PARAMETERS: HCPCS | Performed by: INTERNAL MEDICINE

## 2024-06-25 RX ORDER — PANTOPRAZOLE SODIUM 40 MG/1
40 TABLET, DELAYED RELEASE ORAL DAILY
Qty: 90 TABLET | Refills: 3 | Status: SHIPPED | OUTPATIENT
Start: 2024-06-25

## 2024-06-25 NOTE — PROGRESS NOTES
06/25/2024         IMPRESSION:   Iron deficiency anemia secondary to chronic GI blood loss  Status post GI bleeding  Gastric AV malformation  Status post cardiac cath and coronary stents placement.    PLAN: I reviewed the labs as above and discussed with the patient. I explained to the patient the nature of this hematologic problem.   Patient had severe iron deficiency anemia related to GI blood loss in January 2018.  He had excellent response to IV iron infusion and he had blood transfusion that time.    Repeated labs showed stable disease.  No clear evidence of bleeding.   We will monitor closely. Will continue Protonix.   Patient will continue to take aspirin and Plavix.  Status post recent cardiac stent placement.  He will also take Protonix.  Labs will be repeated in 6 months.  Sooner if he develops any problems.   He will have continued monitoring since it is possible for him to have repeated episodes of bleeding from AV malformation.  Patient understands and agrees.  Patient's questions were answered to the best of his satisfaction and he verbalized full understanding and agreement.

## 2024-06-25 NOTE — TELEPHONE ENCOUNTER
HIRO HERE FOR FOLLOW UP   RV 12 months with labs before RV  LABS ORDERED: CBC FERRITIN IRON & TIBC   WRIER WILL CALL PT TO SCHEDULE ONCE CALENDAR IS MADE   AVS PRINTED AND GIVEN ON EXIT

## 2024-06-28 RX ORDER — METOPROLOL SUCCINATE 50 MG/1
50 TABLET, EXTENDED RELEASE ORAL NIGHTLY
Qty: 90 TABLET | Refills: 3 | Status: SHIPPED | OUTPATIENT
Start: 2024-06-28

## 2024-07-08 NOTE — TELEPHONE ENCOUNTER
Dr Redmond-          JEFFY heart failure care gap has been identified for this patient:   Jason Boothe is a 68 y.o. male with HFrEF but is not currently prescribed one of the three evidence-based beta-blockers, which have demonstrated reduced mortality and hospitalizations. Patient is currently taking metoprolol tartrate 25 mg BID. Recommended alternatives include: metoprolol succinate ER, carvedilol, bisoprolol.   Please consider transitioning patient to evidenced based beta blocker. If you agree, please send new prescription to patient's pharmacy.      Equivalent doses:  Metoprolol Succinate ER  Carvedilol Carvedilol ER Bisoprolol   50 mg daily 6.25 mg BID 20 mg daily 2.5 mg daily         If the patient is unable to take any of these agents, please respond back to me so the contraindication/intolerance can be documented in the medical chart.       See encounter note(s) below for complete details. Please let me know if you have any questions.      Thank you,  Gladys Scott, PharmD, [facility]  [facility] Clinical Pharmacist  Department: [phone]         
I switched him to Toprol-XL 50 mg nightly.  Thank you    Electronically signed by Zeynep Redmond MD on 6/28/2024 at 11:26 AM      [facility]  [phone]        
LM for patient to discuss med change.     Gladys Scott, PharmD, [facility]  [facility]  [facility] Clinical Pharmacist  Department: [phone]    
(HCC), CAD (coronary artery disease), Cardiomyopathy (HCC), Chest pain, Chronic kidney disease, Chronic systolic (congestive) heart failure, Claudication (HCC), Constipation, COPD (chronic obstructive pulmonary disease) (HCC), COVID-19 vaccine series completed, Erectile dysfunction, GI bleed, History of blood transfusion, Hyperlipidemia, Hypertension, Iron deficiency anemia, Prediabetes, Primary adenocarcinoma of prostate ([facility]), Under care of team, Urinary incontinence, Wears dentures, Wears glasses, and Wellness examination.    has a current medication list which includes the following prescription(s): polyethylene glycol, bisacodyl, amlodipine, aspirin, atorvastatin, clopidogrel, trelegy ellipta, isosorbide mononitrate, metoprolol tartrate, ranolazine, sennosides-docusate sodium, albuterol sulfate hfa, docusate sodium, pantoprazole, and nitroglycerin.    Per Reconcile Dispense History:   Dispensed Days Supply Quantity Provider Pharmacy    METOPROLOL TARTRATE 25MG TABLETS 05/22/2024 30 60 each Adeline Ignacio MD [facility]   METOPROLOL TARTRATE 25MG TABLETS 04/24/2024 30 60 each Adeline Ignacio MD [facility]   METOPROLOL TARTRATE 25MG TABLETS 01/16/2024 90 180 each Adeline Ignacio MD [facility]...       BP Readings from Last 3 Encounters:   06/14/24 (!) 154/92   05/14/24 136/82   05/07/24 127/76     Pulse Readings from Last 3 Encounters:   06/14/24 76   05/14/24 65   05/07/24 86     Lab Results   Component Value Date/Time    LABGLOM 59 05/07/2024 10:45 AM    LABGLOM 59 10/17/2023 04:00 PM    LABGLOM 56 08/19/2022 09:30 AM    LABGLOM 52 02/22/2022 12:29 PM    LABGLOM 55 03/18/2021 11:57 AM    CREATININE 1.3 05/07/2024 10:45 AM    CREATININE 1.3 10/17/2023 04:00 PM    CREATININE 1.28 08/19/2022 09:30 AM   Estimated Creatinine Clearance: 54 mL/min (A) (based on SCr of 1.3 mg/dL (H)).    Lab Results   Component Value Date/Time    ALT 12 05/07/2024 10:45 AM    ALT 17

## 2024-07-08 NOTE — TELEPHONE ENCOUNTER
E-scribe request for Trelogy. Please review and e-scribe if applicable. Next Visit Date:  Future Appointments   Date Time Provider Nando Stallings   11/15/2022  2:45 PM Camilla Ibanez  [address] Maintenance   Topic Date Due    Shingles vaccine (1 of 2) 08/09/2023 (Originally 10/12/2004)    Flu vaccine (1) 09/01/2022    Prostate Specific Antigen (PSA) Screening or Monitoring  10/07/2022    Annual Wellness Visit (AWV)  12/22/2022    A1C test (Diabetic or Prediabetic)  02/15/2023    Lipids  02/22/2023    Depression Screen  08/09/2023    Low dose CT lung screening  08/19/2023    Colorectal Cancer Screen  01/23/2028    DTaP/Tdap/Td vaccine (2 - Td or Tdap) 09/03/2030    Pneumococcal 65+ years Vaccine  Completed    COVID-19 Vaccine  Completed    AAA screen  Completed    Hepatitis C screen  Completed    Hepatitis A vaccine  Aged Out    Hepatitis B vaccine  Aged Out    Hib vaccine  Aged Out    Meningococcal (ACWY) vaccine  Aged Out               (applicable per patient's age: Cancer Screenings, Depression Screening, Fall Risk Screening, Immunizations)    Hemoglobin A1C (%)   Date Value   02/15/2022 6.1   03/18/2021 6.2 (H)   06/03/2020 6.3 (H)     Microalb/Crt. Ratio (mcg/mg creat)   Date Value   07/27/2018 CANNOT BE CALCULATED     LDL Cholesterol (mg/dL)   Date Value   02/22/2022 70     AST (U/L)   Date Value   02/22/2022 17     ALT (U/L)   Date Value   02/22/2022 17     BUN (mg/dL)   Date Value   08/19/2022 6 (L)      (goal A1C is < 7)   (goal LDL is <100) need 30-50% reduction from baseline     BP Readings from Last 3 Encounters:   06/09/22 124/87   02/15/22 123/87   12/07/21 117/80    (goal /80)      All Future Testing planned in [facility]:  Lab Frequency Next Occurrence   VL HELIO BILATERAL LIMITED 1-2 LEVELS Once 05/02/2022   Transfuse RBC Transfusion             Patient Active Problem List:     HTN (hypertension)     Prostate cancer adenocarcinoma.  [place] stage 6 staus post Spoke with the pt and got him rescheduled. His Blood sugar was down to 151 before lunch. We went over instructions below and he verbalized understanding.     Generator Change for Internal Cardioverter Defibrillator     Date of Procedure: 8/9/24     Time of Arrival: 12:00 pm   Procedure time: 1:30 pm      Cardiologist performing procedure: Dr. Santos     Arrive at [facility] through the main entrance.  Check in at the Outpatient Diagnostic desk on the 1st floor.     Do not eat or drink anything after midnight the night before the procedure. You may brush your teeth and rinse the morning of the procedure.     Take all your other routine medications the morning of the procedure with the following exceptions:  If you are taking diabetic medications, please HOLD on the day of the procedure (including insulin).  If you take Lantus insulin, take HALF of your usual dose the night before.     Lab work is due within a week of the procedure. You do not need to be fasting for these labs. This can be done at the [facility] Outpatient lab at [address]      Please use Hibiclens soap (or any antibacterial soap such as Dial) to wash neck, chest, and abdomen the night before (or the morning of) the procedure.       Do not apply any lotion, powder, or deodorant after your shower and the morning of the procedure.     Please bring a list of your medications to the hospital with you.     You must have someone available to drive you home that day and stay with you at home the night of the procedure.     If you are unable to make this appointment, [facility] at [phone].    Qgenda updated / updated cupid / emailed [facility] lab   The [facility] pharmacy that Adderall was called into is out of stock. Can you please send to attached pharmacy, [facility]? [facility] order was cancelled.     Thank You   laparoscopic prostatectomy 2009.      History of right  inguinal hernia repair     Stage 3 severe COPD by GOLD classification ([address]     Erectile dysfunction     Hyperlipidemia     CKD (chronic kidney disease)     Prediabetes     History of colon polyps     CAD in native artery     Iron deficiency anemia secondary to inadequate dietary iron intake     Malabsorption     Chronic renal disease, stage III (HCC) [221551]     Chronic systolic (congestive) heart failure

## 2024-08-06 RX ORDER — RANOLAZINE 500 MG/1
500 TABLET, EXTENDED RELEASE ORAL 2 TIMES DAILY
Qty: 180 TABLET | Refills: 0 | Status: SHIPPED | OUTPATIENT
Start: 2024-08-06

## 2024-08-06 NOTE — TELEPHONE ENCOUNTER
Jason Boothe is calling to request a refill on the following medication(s):    Medication Request:  Requested Prescriptions     Pending Prescriptions Disp Refills    ranolazine (RANEXA) 500 MG extended release tablet [Pharmacy Med Name: RANOLAZINE 500MG ER TABLETS] 180 tablet 0     Sig: TAKE 1 TABLET BY MOUTH TWICE DAILY       Last Visit Date (If Applicable):  5/7/2024    Next Visit Date:    Visit date not found

## 2024-08-30 DIAGNOSIS — J44.9 STAGE 3 SEVERE COPD BY GOLD CLASSIFICATION (HCC): ICD-10-CM

## 2024-09-03 RX ORDER — FLUTICASONE FUROATE, UMECLIDINIUM BROMIDE AND VILANTEROL TRIFENATATE 200; 62.5; 25 UG/1; UG/1; UG/1
1 POWDER RESPIRATORY (INHALATION) DAILY
Qty: 60 EACH | Refills: 5 | Status: SHIPPED | OUTPATIENT
Start: 2024-09-03

## 2024-09-03 NOTE — TELEPHONE ENCOUNTER
Jason Boothe is calling to request a refill on the following medication(s):    Medication Request:  Requested Prescriptions     Pending Prescriptions Disp Refills    TRELEGY ELLIPTA 200-62.5-25 MCG/ACT AEPB inhaler [Pharmacy Med Name: TRELEGY ELLIPTA 200-62.5MCG INH 30P] 60 each 5     Sig: INHALE 1 PUFF INTO THE LUNGS DAILY       Last Visit Date (If Applicable):  5/7/2024    Next Visit Date:    Visit date not found

## 2024-09-25 ENCOUNTER — OFFICE VISIT (OUTPATIENT)
Dept: INTERNAL MEDICINE | Age: 69
End: 2024-09-25
Payer: MEDICARE

## 2024-09-25 VITALS
HEART RATE: 84 BPM | HEIGHT: 72 IN | DIASTOLIC BLOOD PRESSURE: 74 MMHG | SYSTOLIC BLOOD PRESSURE: 126 MMHG | WEIGHT: 156.4 LBS | BODY MASS INDEX: 21.18 KG/M2

## 2024-09-25 DIAGNOSIS — N18.30 STAGE 3 CHRONIC KIDNEY DISEASE, UNSPECIFIED WHETHER STAGE 3A OR 3B CKD (HCC): ICD-10-CM

## 2024-09-25 DIAGNOSIS — K59.09 CHRONIC CONSTIPATION: ICD-10-CM

## 2024-09-25 DIAGNOSIS — Z98.61 CAD S/P PERCUTANEOUS CORONARY ANGIOPLASTY: ICD-10-CM

## 2024-09-25 DIAGNOSIS — Z87.891 PERSONAL HISTORY OF TOBACCO USE: ICD-10-CM

## 2024-09-25 DIAGNOSIS — Z00.00 MEDICARE ANNUAL WELLNESS VISIT, SUBSEQUENT: Primary | ICD-10-CM

## 2024-09-25 DIAGNOSIS — I25.10 CAD S/P PERCUTANEOUS CORONARY ANGIOPLASTY: ICD-10-CM

## 2024-09-25 DIAGNOSIS — I10 ESSENTIAL HYPERTENSION: ICD-10-CM

## 2024-09-25 DIAGNOSIS — I70.213 INTERMITTENT CLAUDICATION OF BOTH LOWER EXTREMITIES DUE TO ATHEROSCLEROSIS (HCC): ICD-10-CM

## 2024-09-25 DIAGNOSIS — Z23 FLU VACCINE NEED: ICD-10-CM

## 2024-09-25 PROCEDURE — 90653 IIV ADJUVANT VACCINE IM: CPT | Performed by: INTERNAL MEDICINE

## 2024-09-25 PROCEDURE — G0296 VISIT TO DETERM LDCT ELIG: HCPCS | Performed by: INTERNAL MEDICINE

## 2024-09-25 RX ORDER — AMLODIPINE BESYLATE 5 MG/1
5 TABLET ORAL DAILY
Qty: 90 TABLET | Refills: 0 | Status: SHIPPED | OUTPATIENT
Start: 2024-09-25

## 2024-09-25 RX ORDER — ATORVASTATIN CALCIUM 40 MG/1
TABLET, FILM COATED ORAL
Qty: 90 TABLET | Refills: 1 | Status: SHIPPED | OUTPATIENT
Start: 2024-09-25

## 2024-09-25 RX ORDER — ASPIRIN 81 MG/1
81 TABLET, CHEWABLE ORAL DAILY
Qty: 30 TABLET | Refills: 3 | Status: SHIPPED | OUTPATIENT
Start: 2024-09-25

## 2024-09-25 RX ORDER — CLOPIDOGREL BISULFATE 75 MG/1
75 TABLET ORAL DAILY
Qty: 90 TABLET | Refills: 1 | Status: SHIPPED | OUTPATIENT
Start: 2024-09-25

## 2024-09-25 RX ORDER — POLYETHYLENE GLYCOL 3350 17 G/17G
POWDER, FOR SOLUTION ORAL
Qty: 238 G | Refills: 3 | Status: SHIPPED | OUTPATIENT
Start: 2024-09-25

## 2024-09-25 ASSESSMENT — PATIENT HEALTH QUESTIONNAIRE - PHQ9
SUM OF ALL RESPONSES TO PHQ QUESTIONS 1-9: 6
7. TROUBLE CONCENTRATING ON THINGS, SUCH AS READING THE NEWSPAPER OR WATCHING TELEVISION: NOT AT ALL
9. THOUGHTS THAT YOU WOULD BE BETTER OFF DEAD, OR OF HURTING YOURSELF: NOT AT ALL
1. LITTLE INTEREST OR PLEASURE IN DOING THINGS: SEVERAL DAYS
2. FEELING DOWN, DEPRESSED OR HOPELESS: SEVERAL DAYS
4. FEELING TIRED OR HAVING LITTLE ENERGY: SEVERAL DAYS
10. IF YOU CHECKED OFF ANY PROBLEMS, HOW DIFFICULT HAVE THESE PROBLEMS MADE IT FOR YOU TO DO YOUR WORK, TAKE CARE OF THINGS AT HOME, OR GET ALONG WITH OTHER PEOPLE: NOT DIFFICULT AT ALL
SUM OF ALL RESPONSES TO PHQ QUESTIONS 1-9: 6
5. POOR APPETITE OR OVEREATING: NOT AT ALL
3. TROUBLE FALLING OR STAYING ASLEEP: MORE THAN HALF THE DAYS
SUM OF ALL RESPONSES TO PHQ QUESTIONS 1-9: 6
6. FEELING BAD ABOUT YOURSELF - OR THAT YOU ARE A FAILURE OR HAVE LET YOURSELF OR YOUR FAMILY DOWN: SEVERAL DAYS
SUM OF ALL RESPONSES TO PHQ QUESTIONS 1-9: 6
8. MOVING OR SPEAKING SO SLOWLY THAT OTHER PEOPLE COULD HAVE NOTICED. OR THE OPPOSITE, BEING SO FIGETY OR RESTLESS THAT YOU HAVE BEEN MOVING AROUND A LOT MORE THAN USUAL: NOT AT ALL
SUM OF ALL RESPONSES TO PHQ9 QUESTIONS 1 & 2: 2

## 2024-09-25 ASSESSMENT — LIFESTYLE VARIABLES
HOW OFTEN DO YOU HAVE A DRINK CONTAINING ALCOHOL: NEVER
HOW MANY STANDARD DRINKS CONTAINING ALCOHOL DO YOU HAVE ON A TYPICAL DAY: PATIENT DOES NOT DRINK

## 2024-10-14 ENCOUNTER — HOSPITAL ENCOUNTER (OUTPATIENT)
Age: 69
Discharge: HOME | End: 2024-10-16
Attending: INTERNAL MEDICINE
Payer: MEDICARE

## 2024-10-14 DIAGNOSIS — Z87.891 PERSONAL HISTORY OF TOBACCO USE: ICD-10-CM

## 2024-10-14 PROCEDURE — 71271 CT THORAX LUNG CANCER SCR C-: CPT

## 2024-10-31 RX ORDER — RANOLAZINE 500 MG/1
500 TABLET, EXTENDED RELEASE ORAL 2 TIMES DAILY
Qty: 180 TABLET | Refills: 0 | Status: SHIPPED | OUTPATIENT
Start: 2024-10-31

## 2024-10-31 NOTE — TELEPHONE ENCOUNTER
Jason Boothe is calling to request a refill on the following medication(s):    Medication Request:  Requested Prescriptions     Pending Prescriptions Disp Refills    ranolazine (RANEXA) 500 MG extended release tablet [Pharmacy Med Name: RANOLAZINE 500MG ER TABLETS] 180 tablet 0     Sig: TAKE 1 TABLET BY MOUTH TWICE DAILY       Last Visit Date (If Applicable):  9/25/2024    Next Visit Date:    Visit date not found

## 2024-11-07 DIAGNOSIS — I10 ESSENTIAL HYPERTENSION: ICD-10-CM

## 2024-11-07 DIAGNOSIS — I25.5 ISCHEMIC CARDIOMYOPATHY: ICD-10-CM

## 2024-11-07 DIAGNOSIS — I25.10 CAD S/P PERCUTANEOUS CORONARY ANGIOPLASTY: ICD-10-CM

## 2024-11-07 DIAGNOSIS — Z98.61 CAD S/P PERCUTANEOUS CORONARY ANGIOPLASTY: ICD-10-CM

## 2024-11-07 NOTE — TELEPHONE ENCOUNTER
Jason Boothe is calling to request a refill on the following medication(s):    Medication Request:  Requested Prescriptions     Pending Prescriptions Disp Refills    metoprolol tartrate (LOPRESSOR) 25 MG tablet [Pharmacy Med Name: METOPROLOL TARTRATE 25MG TABLETS] 180 tablet 0     Sig: TAKE 1 TABLET BY MOUTH TWICE DAILY       Last Visit Date (If Applicable):  9/25/2024    Next Visit Date:    Visit date not found

## 2024-11-08 RX ORDER — METOPROLOL TARTRATE 25 MG/1
TABLET, FILM COATED ORAL
Qty: 180 TABLET | Refills: 0 | Status: SHIPPED | OUTPATIENT
Start: 2024-11-08

## 2025-02-06 DIAGNOSIS — I10 ESSENTIAL HYPERTENSION: ICD-10-CM

## 2025-02-07 RX ORDER — AMLODIPINE BESYLATE 5 MG/1
5 TABLET ORAL DAILY
Qty: 90 TABLET | Refills: 0 | Status: SHIPPED | OUTPATIENT
Start: 2025-02-07

## 2025-02-07 RX ORDER — RANOLAZINE 500 MG/1
500 TABLET, EXTENDED RELEASE ORAL 2 TIMES DAILY
Qty: 180 TABLET | Refills: 0 | Status: SHIPPED | OUTPATIENT
Start: 2025-02-07

## 2025-02-07 NOTE — TELEPHONE ENCOUNTER
Jason Boothe is calling to request a refill on the following medication(s):    Medication Request:  Requested Prescriptions     Pending Prescriptions Disp Refills    amLODIPine (NORVASC) 5 MG tablet [Pharmacy Med Name: AMLODIPINE BESYLATE 5MG TABLETS] 90 tablet 0     Sig: TAKE 1 TABLET BY MOUTH DAILY    ranolazine (RANEXA) 500 MG extended release tablet [Pharmacy Med Name: RANOLAZINE 500MG ER TABLETS] 180 tablet 0     Sig: TAKE 1 TABLET BY MOUTH TWICE DAILY       Last Visit Date (If Applicable):  9/25/2024    Next Visit Date:    Visit date not found

## 2025-03-05 DIAGNOSIS — J44.9 STAGE 3 SEVERE COPD BY GOLD CLASSIFICATION (HCC): ICD-10-CM

## 2025-03-07 RX ORDER — FLUTICASONE FUROATE, UMECLIDINIUM BROMIDE AND VILANTEROL TRIFENATATE 200; 62.5; 25 UG/1; UG/1; UG/1
1 POWDER RESPIRATORY (INHALATION) DAILY
Qty: 60 EACH | Refills: 5 | Status: SHIPPED | OUTPATIENT
Start: 2025-03-07

## 2025-03-07 NOTE — TELEPHONE ENCOUNTER
..Request for   Requested Prescriptions     Pending Prescriptions Disp Refills    TRELEGY ELLIPTA 200-62.5-25 MCG/ACT AEPB inhaler [Pharmacy Med Name: TRELEGY ELLIPTA 200-62.5MCG INH 30P] 60 each 5     Sig: INHALE 1 PUFF INTO THE LUNGS DAILY    .      Please review and e-scribe to pharmacy listed in chart if appropriate. Thank you.      Last Visit Date: 9/25/2024  Next Visit Date: Visit date not found    Future Appointments   Date Time Provider Department Center   5/2/2025 11:15 AM Gerhard Roman MD [facility]       Health Maintenance   Topic Date Due    Shingles vaccine (1 of 2) Never done    Respiratory Syncytial Virus (RSV) Pregnant or age 60 yrs+ (1 - Risk 60-74 years 1-dose series) Never done    COVID-19 Vaccine (5 - 2024-25 season) 09/01/2024    Annual Wellness Visit (Medicare Advantage)  01/01/2025    A1C test (Diabetic or Prediabetic)  05/07/2025    Lipids  05/07/2025    GFR test (Diabetes, CKD 3-4, OR last GFR 15-59)  05/07/2025    Prostate Specific Antigen (PSA) Screening or Monitoring  05/07/2025    Depression Screen  09/25/2025    DTaP/Tdap/Td vaccine (2 - Td or Tdap) 09/03/2030    Colorectal Cancer Screen  06/14/2034    Flu vaccine  Completed    Pneumococcal 50+ years Vaccine  Completed    AAA screen  Completed    Hepatitis C screen  Completed    Hepatitis A vaccine  Aged Out    Hepatitis B vaccine  Aged Out    Hib vaccine  Aged Out    Polio vaccine  Aged Out    Meningococcal (ACWY) vaccine  Aged Out    HIV screen  Discontinued    Lung Cancer Screening &/or Counseling  Discontinued       Hemoglobin A1C (%)   Date Value   05/07/2024 5.8   02/15/2022 6.1   03/18/2021 6.2 (H)             ( goal A1C is < 7)   No components found for: \"LABMICR\"  No components found for: \"LDLCHOLESTEROL\", \"LDLCALC\"    (goal LDL is <100)   AST (U/L)   Date Value   05/07/2024 17     ALT (U/L)   Date Value   05/07/2024 12     BUN (mg/dL)   Date Value   05/07/2024 9     BP Readings from Last 3 Encounters:

## 2025-04-16 ENCOUNTER — OFFICE VISIT (OUTPATIENT)
Dept: INTERNAL MEDICINE | Age: 70
End: 2025-04-16
Payer: MEDICARE

## 2025-04-16 VITALS
SYSTOLIC BLOOD PRESSURE: 133 MMHG | DIASTOLIC BLOOD PRESSURE: 84 MMHG | OXYGEN SATURATION: 99 % | RESPIRATION RATE: 20 BRPM | HEIGHT: 72 IN | HEART RATE: 71 BPM | WEIGHT: 159 LBS | BODY MASS INDEX: 21.54 KG/M2 | TEMPERATURE: 97.2 F

## 2025-04-16 DIAGNOSIS — E78.2 MIXED HYPERLIPIDEMIA: ICD-10-CM

## 2025-04-16 DIAGNOSIS — H53.9 CHANGES IN VISION: Primary | ICD-10-CM

## 2025-04-16 DIAGNOSIS — C61 PROSTATE CANCER (HCC): ICD-10-CM

## 2025-04-16 DIAGNOSIS — Z00.00 WELLNESS EXAMINATION: ICD-10-CM

## 2025-04-16 DIAGNOSIS — J44.9 STAGE 3 SEVERE COPD BY GOLD CLASSIFICATION (HCC): ICD-10-CM

## 2025-04-16 DIAGNOSIS — L30.9 ECZEMA OF BOTH HANDS: ICD-10-CM

## 2025-04-16 DIAGNOSIS — N18.30 STAGE 3 CHRONIC KIDNEY DISEASE, UNSPECIFIED WHETHER STAGE 3A OR 3B CKD (HCC): ICD-10-CM

## 2025-04-16 PROCEDURE — 3023F SPIROM DOC REV: CPT

## 2025-04-16 PROCEDURE — 3079F DIAST BP 80-89 MM HG: CPT

## 2025-04-16 PROCEDURE — 1036F TOBACCO NON-USER: CPT

## 2025-04-16 PROCEDURE — 99214 OFFICE O/P EST MOD 30 MIN: CPT

## 2025-04-16 PROCEDURE — G8427 DOCREV CUR MEDS BY ELIG CLIN: HCPCS

## 2025-04-16 PROCEDURE — 99212 OFFICE O/P EST SF 10 MIN: CPT | Performed by: HOSPITALIST

## 2025-04-16 PROCEDURE — G8420 CALC BMI NORM PARAMETERS: HCPCS

## 2025-04-16 PROCEDURE — 1159F MED LIST DOCD IN RCRD: CPT

## 2025-04-16 PROCEDURE — 3017F COLORECTAL CA SCREEN DOC REV: CPT

## 2025-04-16 PROCEDURE — 1126F AMNT PAIN NOTED NONE PRSNT: CPT

## 2025-04-16 PROCEDURE — 1123F ACP DISCUSS/DSCN MKR DOCD: CPT

## 2025-04-16 PROCEDURE — 3075F SYST BP GE 130 - 139MM HG: CPT

## 2025-04-16 RX ORDER — CLOBETASOL PROPIONATE 0.5 MG/G
OINTMENT TOPICAL
Qty: 15 G | Refills: 0 | Status: SHIPPED | OUTPATIENT
Start: 2025-04-16

## 2025-04-16 RX ORDER — HYDROXYZINE HYDROCHLORIDE 25 MG/1
25 TABLET, FILM COATED ORAL EVERY 8 HOURS PRN
Qty: 30 TABLET | Refills: 0 | Status: SHIPPED | OUTPATIENT
Start: 2025-04-16 | End: 2025-04-26

## 2025-04-16 SDOH — ECONOMIC STABILITY: FOOD INSECURITY: WITHIN THE PAST 12 MONTHS, THE FOOD YOU BOUGHT JUST DIDN'T LAST AND YOU DIDN'T HAVE MONEY TO GET MORE.: SOMETIMES TRUE

## 2025-04-16 SDOH — ECONOMIC STABILITY: FOOD INSECURITY: WITHIN THE PAST 12 MONTHS, YOU WORRIED THAT YOUR FOOD WOULD RUN OUT BEFORE YOU GOT MONEY TO BUY MORE.: SOMETIMES TRUE

## 2025-04-16 ASSESSMENT — ENCOUNTER SYMPTOMS
DIARRHEA: 0
WHEEZING: 0
SINUS PRESSURE: 0
ABDOMINAL PAIN: 0
SHORTNESS OF BREATH: 0
COLOR CHANGE: 1
RHINORRHEA: 0
VOMITING: 0
NAUSEA: 0
COUGH: 0

## 2025-04-16 ASSESSMENT — ANXIETY QUESTIONNAIRES
1. FEELING NERVOUS, ANXIOUS, OR ON EDGE: NOT AT ALL
GAD7 TOTAL SCORE: 1
3. WORRYING TOO MUCH ABOUT DIFFERENT THINGS: SEVERAL DAYS
4. TROUBLE RELAXING: NOT AT ALL
7. FEELING AFRAID AS IF SOMETHING AWFUL MIGHT HAPPEN: NOT AT ALL
5. BEING SO RESTLESS THAT IT IS HARD TO SIT STILL: NOT AT ALL
2. NOT BEING ABLE TO STOP OR CONTROL WORRYING: NOT AT ALL
6. BECOMING EASILY ANNOYED OR IRRITABLE: NOT AT ALL
IF YOU CHECKED OFF ANY PROBLEMS ON THIS QUESTIONNAIRE, HOW DIFFICULT HAVE THESE PROBLEMS MADE IT FOR YOU TO DO YOUR WORK, TAKE CARE OF THINGS AT HOME, OR GET ALONG WITH OTHER PEOPLE: NOT DIFFICULT AT ALL

## 2025-04-16 ASSESSMENT — PATIENT HEALTH QUESTIONNAIRE - PHQ9
SUM OF ALL RESPONSES TO PHQ QUESTIONS 1-9: 0
2. FEELING DOWN, DEPRESSED OR HOPELESS: NOT AT ALL
1. LITTLE INTEREST OR PLEASURE IN DOING THINGS: NOT AT ALL
SUM OF ALL RESPONSES TO PHQ QUESTIONS 1-9: 0
SUM OF ALL RESPONSES TO PHQ QUESTIONS 1-9: 0
DEPRESSION UNABLE TO ASSESS: PT REFUSES
SUM OF ALL RESPONSES TO PHQ QUESTIONS 1-9: 0

## 2025-04-16 NOTE — PROGRESS NOTES
Attending Physician Statement  I have discussed the care of Jason Boothe, including pertinent history and exam findings with the resident. I have reviewed the key elements of all parts of the encounter with the resident. I have seen and examined the patient with the resident and the key elements of all parts of the encounter have been performed by me.  I agree with the assessment, and status of the problem list as documented. The plan and orders should include   Orders Placed This Encounter   Procedures    Lipid, Fasting    Comprehensive Metabolic Panel    PSA, Diagnostic    Hemoglobin A1C    FRANCOIS - Pratik Harris MD, Ophthalmology, Vandana Davis MD, Dermatology, [place]    and this was also documented by the resident.  The medication list was reviewed with the resident and is up to date. The return visit should be in 3 months .    Chele Waggoner MD  Attending Physician,  Department of Internal Medicine  [facility] Internal Medicine  [facility]      4/16/2025, 3:41 PM  
follow with cardiology  -Continue Imdur 30 mg OD and Ranexa 500 mg twice daily for anginal pain.  Advised to use sublingual nitroglycerin in case of chest pain and call 911 if chest pain does not resolve with sublingual nitroglycerin    Changes in vision  -     [facility] - Pratik Harris MD, Ophthalmology, [place]    Stage 3 severe COPD by GOLD classification ([facility])  - Continue Trelegy Ellipta 1 puff daily along with albuterol HFA as needed for wheezing and shortness of breath.  -Patient has been advised to avoid active or passive smoking  -Patient has also been advised to be vaccinated against COVID, influenza and RSV    Stage 3 chronic kidney disease, unspecified whether stage 3a or 3b CKD ([facility])  -     Comprehensive Metabolic Panel; Future    Mixed hyperlipidemia  -     Lipid, Fasting; Future    Prostate cancer adenocarcinoma. [place] stage 6 staus post laparoscopic prostatectomy 2009.  -     PSA, Diagnostic; Future    Wellness examination  -     Hemoglobin A1C; Future    Eczema of both hands  -     clobetasol (TEMOVATE) 0.05 % ointment; Apply topically 2 times daily.  -     [facility] Vandana Landry MD, [facility]  -     hydrOXYzine HCl (ATARAX) 25 MG tablet; Take 1 tablet by mouth every 8 hours as needed for Itching          FOLLOW UP AND INSTRUCTIONS:  Return in about 3 months (around 7/16/2025) for Annual Wellness Visit.    Jason received counseling on the following healthy behaviors: nutrition, exercise, and medication adherence    Discussed use, benefit, and side effects of prescribed medications.  Barriers to medication compliance addressed.  All patient questions answered.  Pt voiced understanding.    Patient given educational materials - see patient instructions    Ciarra Cedeno MD  Resident Internal Medicine, PGY-2  [facility],  [place], [place].  4/16/2025, 4:05 PM    This note is created with the assistance of a speech-recognition program. While intending to generate a document that

## 2025-05-05 ENCOUNTER — HOSPITAL ENCOUNTER (OUTPATIENT)
Age: 70
Setting detail: SPECIMEN
Discharge: HOME | End: 2025-05-05

## 2025-05-05 DIAGNOSIS — Z00.00 WELLNESS EXAMINATION: ICD-10-CM

## 2025-05-05 DIAGNOSIS — N18.30 STAGE 3 CHRONIC KIDNEY DISEASE, UNSPECIFIED WHETHER STAGE 3A OR 3B CKD (HCC): ICD-10-CM

## 2025-05-05 DIAGNOSIS — E78.2 MIXED HYPERLIPIDEMIA: ICD-10-CM

## 2025-05-05 DIAGNOSIS — C61 PROSTATE CANCER (HCC): ICD-10-CM

## 2025-05-05 LAB
ALBUMIN SERPL-MCNC: 4 G/DL (ref 3.5–5.2)
ALBUMIN/GLOB SERPL: 1.4 {RATIO} (ref 1–2.5)
ALP SERPL-CCNC: 71 U/L (ref 40–129)
ALT SERPL-CCNC: 16 U/L (ref 10–50)
ANION GAP SERPL CALCULATED.3IONS-SCNC: 11 MMOL/L (ref 9–16)
AST SERPL-CCNC: 23 U/L (ref 10–50)
BILIRUB SERPL-MCNC: 0.4 MG/DL (ref 0–1.2)
BUN SERPL-MCNC: 11 MG/DL (ref 8–23)
CALCIUM SERPL-MCNC: 8.7 MG/DL (ref 8.6–10.4)
CHLORIDE SERPL-SCNC: 101 MMOL/L (ref 98–107)
CHOLEST SERPL-MCNC: 117 MG/DL (ref 0–199)
CHOLESTEROL/HDL RATIO: 3.3
CO2 SERPL-SCNC: 27 MMOL/L (ref 20–31)
CREAT SERPL-MCNC: 1.3 MG/DL (ref 0.7–1.2)
EST. AVERAGE GLUCOSE BLD GHB EST-MCNC: 128 MG/DL
GFR, ESTIMATED: 59 ML/MIN/1.73M2
GLUCOSE SERPL-MCNC: 90 MG/DL (ref 74–99)
HBA1C MFR BLD: 6.1 % (ref 4–6)
HDLC SERPL-MCNC: 35 MG/DL
LDLC SERPL CALC-MCNC: 65 MG/DL (ref 0–100)
POTASSIUM SERPL-SCNC: 4.3 MMOL/L (ref 3.7–5.3)
PROT SERPL-MCNC: 6.9 G/DL (ref 6.6–8.7)
PSA SERPL-MCNC: <0.03 NG/ML (ref 0–4)
SODIUM SERPL-SCNC: 139 MMOL/L (ref 136–145)
TRIGL SERPL-MCNC: 87 MG/DL (ref 0–149)
VLDLC SERPL CALC-MCNC: 17 MG/DL (ref 1–30)

## 2025-05-07 ENCOUNTER — RESULTS FOLLOW-UP (OUTPATIENT)
Dept: CRITICAL CARE MEDICINE | Age: 70
End: 2025-05-07

## 2025-05-13 ENCOUNTER — TELEPHONE (OUTPATIENT)
Age: 70
End: 2025-05-13

## 2025-06-05 DIAGNOSIS — Z98.61 CAD S/P PERCUTANEOUS CORONARY ANGIOPLASTY: ICD-10-CM

## 2025-06-05 DIAGNOSIS — I25.10 CAD S/P PERCUTANEOUS CORONARY ANGIOPLASTY: ICD-10-CM

## 2025-06-05 NOTE — TELEPHONE ENCOUNTER
Request for   Requested Prescriptions     Pending Prescriptions Disp Refills    isosorbide mononitrate (IMDUR) 30 MG extended release tablet 90 tablet 2     Sig: Take 1 tablet by mouth every morning    .      Please review and e-scribe to pharmacy listed in chart if appropriate. Thank you.      Last Visit Date: Visit date not found  Next Visit Date: 7/23/2025    Future Appointments   Date Time Provider Department Center   7/23/2025  1:40 PM Ciarra Cedeno MD [facility]   11/21/2025 10:30 AM Gerhard Roman MD [facility]       Health Maintenance   Topic Date Due    Annual Wellness Visit (Medicare Advantage)  01/01/2025    Shingles vaccine (1 of 2) 04/16/2026 (Originally 10/12/2004)    COVID-19 Vaccine (3 - 2024-25 season) 04/16/2026 (Originally 9/1/2024)    Respiratory Syncytial Virus (RSV) Pregnant or age 60 yrs+ (1 - Risk 60-74 years 1-dose series) 04/16/2026 (Originally 10/12/2014)    Depression Screen  04/16/2026    A1C test (Diabetic or Prediabetic)  05/05/2026    Lipids  05/05/2026    GFR test (Diabetes, CKD 3-4, OR last GFR 15-59)  05/05/2026    Prostate Specific Antigen (PSA) Screening or Monitoring  05/05/2026    DTaP/Tdap/Td vaccine (2 - Td or Tdap) 09/03/2030    Colorectal Cancer Screen  06/14/2034    Flu vaccine  Completed    Pneumococcal 50+ years Vaccine  Completed    AAA screen  Completed    Hepatitis C screen  Completed    Hepatitis A vaccine  Aged Out    Hepatitis B vaccine  Aged Out    Hib vaccine  Aged Out    Polio vaccine  Aged Out    Meningococcal (ACWY) vaccine  Aged Out    Meningococcal B vaccine  Aged Out    HIV screen  Discontinued    Lung Cancer Screening &/or Counseling  Discontinued       Hemoglobin A1C (%)   Date Value   05/05/2025 6.1 (H)   05/07/2024 5.8   02/15/2022 6.1             ( goal A1C is < 7)   No components found for: \"LABMICR\"  No components found for: \"LDLCHOLESTEROL\", \"LDLCALC\"    (goal LDL is <100)   AST (U/L)   Date Value   05/05/2025 23     ALT

## 2025-06-07 RX ORDER — ISOSORBIDE MONONITRATE 30 MG/1
30 TABLET, EXTENDED RELEASE ORAL EVERY MORNING
Qty: 90 TABLET | Refills: 2 | Status: SHIPPED | OUTPATIENT
Start: 2025-06-07

## 2025-07-14 RX ORDER — METOPROLOL SUCCINATE 50 MG/1
50 TABLET, EXTENDED RELEASE ORAL NIGHTLY
Qty: 90 TABLET | Refills: 3 | OUTPATIENT
Start: 2025-07-14

## 2025-07-23 ENCOUNTER — TELEPHONE (OUTPATIENT)
Age: 70
End: 2025-07-23

## 2025-07-23 NOTE — TELEPHONE ENCOUNTER
Called the patient to reschedule his missed appointment from today, 7/23/25 and was unable to reach him by phone. Writer could not leave a voice mail because the phone just rang.

## (undated) DEVICE — BIPOLAR ELECTROHEMOSTASIS CATHETER: Brand: INJECTION GOLD PROBE

## (undated) DEVICE — SYRINGE MED 50ML LUERLOCK TIP

## (undated) DEVICE — AIRLIFE™ NASAL OXYGEN CANNULA CURVED, FLARED TIP, WITH 7 FEET (2.1 M) CRUSH RESISTANT TUBING, OVER-THE-EAR STYLE: Brand: AIRLIFE™

## (undated) DEVICE — SINGLE-USE POLYPECTOMY SNARE: Brand: CAPTIFLEX

## (undated) DEVICE — SINGLE-USE BIOPSY FORCEPS: Brand: RADIAL JAW 4

## (undated) DEVICE — GAUZE,SPONGE,4"X4",16PLY,XRAY,STRL,LF: Brand: MEDLINE

## (undated) DEVICE — UNIVERSAL ANESTHESIA SET, MALE LUER LOCK ADAPTER

## (undated) DEVICE — JELLY,LUBE,STERILE,FLIP TOP,TUBE,2-OZ: Brand: MEDLINE

## (undated) DEVICE — FORCEPS BX L240CM JAW DIA22MM ORNG STD CAP W NDL RAD JAW 4

## (undated) DEVICE — TRAP SPEC RETRV CLR PLAS POLYP IN LN SUCT QUIK CTCH

## (undated) DEVICE — DEFENDO AIR WATER SUCTION AND BIOPSY VALVE KIT FOR  OLYMPUS: Brand: DEFENDO AIR/WATER/SUCTION AND BIOPSY VALVE